# Patient Record
Sex: MALE | Race: WHITE | NOT HISPANIC OR LATINO | Employment: FULL TIME | URBAN - METROPOLITAN AREA
[De-identification: names, ages, dates, MRNs, and addresses within clinical notes are randomized per-mention and may not be internally consistent; named-entity substitution may affect disease eponyms.]

---

## 2017-03-07 ENCOUNTER — ALLSCRIPTS OFFICE VISIT (OUTPATIENT)
Dept: OTHER | Facility: OTHER | Age: 42
End: 2017-03-07

## 2017-07-19 ENCOUNTER — GENERIC CONVERSION - ENCOUNTER (OUTPATIENT)
Dept: OTHER | Facility: OTHER | Age: 42
End: 2017-07-19

## 2017-07-19 ENCOUNTER — APPOINTMENT (OUTPATIENT)
Dept: URGENT CARE | Facility: MEDICAL CENTER | Age: 42
End: 2017-07-19

## 2017-07-19 PROCEDURE — G0382 LEV 3 HOSP TYPE B ED VISIT: HCPCS

## 2017-11-02 ENCOUNTER — APPOINTMENT (OUTPATIENT)
Dept: LAB | Facility: MEDICAL CENTER | Age: 42
End: 2017-11-02
Payer: COMMERCIAL

## 2017-11-02 DIAGNOSIS — E03.9 HYPOTHYROIDISM: ICD-10-CM

## 2017-11-02 DIAGNOSIS — R53.83 OTHER FATIGUE: ICD-10-CM

## 2017-11-02 DIAGNOSIS — E66.9 OBESITY: ICD-10-CM

## 2017-11-02 LAB
EST. AVERAGE GLUCOSE BLD GHB EST-MCNC: 131 MG/DL
HBA1C MFR BLD: 6.2 % (ref 4.2–6.3)
TSH SERPL DL<=0.05 MIU/L-ACNC: 8.55 UIU/ML (ref 0.36–3.74)

## 2017-11-02 PROCEDURE — 36415 COLL VENOUS BLD VENIPUNCTURE: CPT

## 2017-11-02 PROCEDURE — 84443 ASSAY THYROID STIM HORMONE: CPT

## 2017-11-02 PROCEDURE — 83036 HEMOGLOBIN GLYCOSYLATED A1C: CPT

## 2018-01-13 VITALS
WEIGHT: 289.6 LBS | HEART RATE: 68 BPM | OXYGEN SATURATION: 100 % | BODY MASS INDEX: 37.17 KG/M2 | TEMPERATURE: 97.2 F | DIASTOLIC BLOOD PRESSURE: 78 MMHG | RESPIRATION RATE: 18 BRPM | HEIGHT: 74 IN | SYSTOLIC BLOOD PRESSURE: 140 MMHG

## 2018-01-15 ENCOUNTER — ALLSCRIPTS OFFICE VISIT (OUTPATIENT)
Dept: OTHER | Facility: OTHER | Age: 43
End: 2018-01-15

## 2018-01-15 DIAGNOSIS — R07.9 CHEST PAIN: ICD-10-CM

## 2018-01-16 NOTE — PROGRESS NOTES
Assessment   1  Hypothyroidism (244 9) (E03 9)   2  Chest pain (786 50) (R07 9)   3  Colon cancer screening (V76 51) (Z12 11)   4  Low serum testosterone level (790 99) (R79 89)   5  Obesity (278 00) (E66 9)    Plan   Chest pain    · EKG/ECG- POC; Status:Active - Perform Order,Retrospective Authorization; Requested    for:15Jan2018;    · STRESS TEST ONLY, EXERCISE; Status:Active - Retrospective Authorization; Requested    for:15Jan2018;   Colon cancer screening    · 1 - Luis HOLLAND, Hellen Salter  (Gastroenterology) Co-Management  *  Status: Active - Retrospective    Authorization  Requested for: [id number]  Care Summary provided  : Yes   · COLONOSCOPY; Status:Active - Retrospective Authorization; Requested for:15Jan2018;   Hyperlipidemia, Hypothyroidism    · (1) CBC/PLT/DIFF; Status:Active - Retrospective Authorization; Requested for:06Gcw0542;       · (1) HEMOGLOBIN A1C; Status:Active - Retrospective Authorization; Requested    for:59Laq3248;    · (1) LIPID PANEL, FASTING; Status:Active - Retrospective Authorization; Requested    for:87Gzs2758;    · (1) TSH; Status:Active - Retrospective Authorization; Requested for:17Wfg9828; Low serum testosterone level    · *1 - [facility] Co-Management  *  Status: Active - Retrospective    Authorization  Requested for: [id number]  Care Summary provided  : Yes  Obesity    · Levothyroxine Sodium 25 MCG Oral Tablet; TAKE 1 TABLET DAILY  Thyroid disorder    · Levothyroxine Sodium 200 MCG Oral Tablet; TAKE 1 TABLET BY MOUTH EVERY    DAY AS DIRECTED      Check EKG  Stress test   Colonoscopy referral   Blood work in 6 weeks  Recheck at that time  Discussion/Summary      1  Hypothyroidism-significantly improved when he is compliant with his levothyroxine  Will increase the dose to 225 and repeat TSH in 6 weeks  Obesity-encouraged to get back on track with his diet and exercise   Chest pain-due to risk factors and the fact that it is exertional will get a stress test Family history of colon cancer-should have colonoscopy Low testosterone/low libido-will refer to Urology  History of Present Illness   Jenny Flores says he lost 32 lbs by watching his diet and walking 3 miles  Unfortunately he has gained a lot of it back  never got a stress test done as discussed last year  Alejandro Manifold He gets chest pain with exertion  Walking fast, squatting at work  is concerned about UTI's  He had 2-3 times in the past several years  However he has not had any in 3 years  He stopped drinking Dr Theodore Claude  He has been trying to drink water only  is concerned about his low libido  Blood work which showed a low testosterone several years ago but he never followed up with this  has a family history of colon cancer in his grandfather at age 32 and a grandmother at age 39  Unrelated  said that he has been compliant with his levothyroxine  He takes it on an empty stomach  is interested in trying to wean off of his narcotics that he uses for chronic back pain  He sees a pain management specialist in [place]  Active Problems   1  Abnormal EKG (794 31) (R94 31)   2  Anxiety disorder (300 00) (F41 9)   3  Contact dermatitis due to poison ivy (692 6) (L23 7)   4  Depression (311) (F32 9)   5  Elevated WBCs (288 60) (D72 829)   6  Fatigue (780 79) (R53 83)   7  Hematuria (599 70) (R31 9)   8  History of abnormal electrocardiogram (V12 59) (Z86 79)   9  Hyperlipidemia (272 4) (E78 5)   10  Hypothyroidism (244 9) (E03 9)   11  Low serum testosterone level (790 99) (R79 89)   12  Lower back pain (724 2) (M54 5)   13  Obesity (278 00) (E66 9)   14  Thyroid disorder (246 9) (E07 9)   15  Umbilical hernia (734 9) (K42 9)   16  Urinary tract infection (599 0) (N39 0)    Past Medical History   1  History of urinary tract infection (V13 02) (Z87 440)    Surgical History   1  History of Appendectomy   2  History of Cholecystectomy   3  History of Cholecystectomy   4  History of Laminectomy Lumbar   5   History of Umbilical Hernia Repair   6  History of Umbilical Hernia Repair    Family History   Mother    1  Family history of    2  Family history of hepatic cirrhosis (V18 59) (Z83 79)   3  No pertinent family history  Father    4  Family history of    5  Family history of hepatic cirrhosis (V18 59) (Z83 79)    Social History    · Denied: History of Alcohol use   · Former smoker () (G93 434)   · Former smoker () (E84 663)    Current Meds    1  Levothyroxine Sodium 200 MCG Oral Tablet; TAKE 1 TABLET BY MOUTH EVERY DAY AS     DIRECTED; Therapy: 47IHX4924 to (Evaluate:2018)  Requested for: 71LMC4408; Last     Rx:2018 Ordered   2  OxyCODONE HCl - 15 MG Oral Tablet; Therapy: 25RVM0203 to (Evaluate:83Vhw6839) Recorded   3  OxyCONTIN 40 MG Oral Tablet ER 12 Hour Abuse-Deterrent; 1tablet three times a day     Recorded    Allergies   1  No Known Drug Allergies    Vitals   Vital Signs    Recorded: 87FZW2528 09:54AM   Heart Rate 60   Respiration 16   Systolic 284   Diastolic 84   BP CUFF SIZE Large   Weight 298 lb    BMI Calculated 38 26   BSA Calculated 2 57     Physical Exam        Constitutional      General appearance: No acute distress, well appearing and well nourished  Neck      Neck: Supple, symmetric, trachea midline, no masses  Thyroid: Normal, no thyromegaly  Pulmonary      Respiratory effort: No increased work of breathing or signs of respiratory distress  Auscultation of lungs: Clear to auscultation  Cardiovascular      Auscultation of heart: Normal rate and rhythm, normal S1 and S2, no murmurs         Signatures    Electronically signed by : BEATRICE Garnett ; Adriano 15 2018  9:58PM EST                       (Author)

## 2018-01-17 NOTE — MISCELLANEOUS
Provider Comments  Provider Comments: This appointment was confirmed with the patients wife        Signatures   Electronically signed by : Ting Arechiga, ; Mar  7 2017  9:06AM EST                       (Author)

## 2018-01-22 VITALS
SYSTOLIC BLOOD PRESSURE: 124 MMHG | RESPIRATION RATE: 16 BRPM | WEIGHT: 298 LBS | DIASTOLIC BLOOD PRESSURE: 84 MMHG | HEART RATE: 60 BPM | BODY MASS INDEX: 38.26 KG/M2

## 2018-03-16 ENCOUNTER — TRANSCRIBE ORDERS (OUTPATIENT)
Dept: ADMINISTRATIVE | Facility: HOSPITAL | Age: 43
End: 2018-03-16

## 2018-03-16 ENCOUNTER — APPOINTMENT (OUTPATIENT)
Dept: LAB | Facility: MEDICAL CENTER | Age: 43
End: 2018-03-16
Payer: COMMERCIAL

## 2018-03-16 DIAGNOSIS — E03.9 HYPOTHYROIDISM, UNSPECIFIED TYPE: ICD-10-CM

## 2018-03-16 DIAGNOSIS — E78.5 HYPERLIPIDEMIA, UNSPECIFIED HYPERLIPIDEMIA TYPE: ICD-10-CM

## 2018-03-16 DIAGNOSIS — E03.9 HYPOTHYROIDISM, UNSPECIFIED TYPE: Primary | ICD-10-CM

## 2018-03-16 LAB
BASOPHILS # BLD AUTO: 0.01 THOUSANDS/ΜL (ref 0–0.1)
BASOPHILS NFR BLD AUTO: 0 % (ref 0–1)
EOSINOPHIL # BLD AUTO: 0.08 THOUSAND/ΜL (ref 0–0.61)
EOSINOPHIL NFR BLD AUTO: 1 % (ref 0–6)
ERYTHROCYTE [DISTWIDTH] IN BLOOD BY AUTOMATED COUNT: 13.7 % (ref 11.6–15.1)
EST. AVERAGE GLUCOSE BLD GHB EST-MCNC: 114 MG/DL
HBA1C MFR BLD: 5.6 % (ref 4.2–6.3)
HCT VFR BLD AUTO: 39.6 % (ref 36.5–49.3)
HGB BLD-MCNC: 13.2 G/DL (ref 12–17)
LYMPHOCYTES # BLD AUTO: 2.74 THOUSANDS/ΜL (ref 0.6–4.47)
LYMPHOCYTES NFR BLD AUTO: 35 % (ref 14–44)
MCH RBC QN AUTO: 28.1 PG (ref 26.8–34.3)
MCHC RBC AUTO-ENTMCNC: 33.3 G/DL (ref 31.4–37.4)
MCV RBC AUTO: 84 FL (ref 82–98)
MONOCYTES # BLD AUTO: 0.63 THOUSAND/ΜL (ref 0.17–1.22)
MONOCYTES NFR BLD AUTO: 8 % (ref 4–12)
NEUTROPHILS # BLD AUTO: 4.46 THOUSANDS/ΜL (ref 1.85–7.62)
NEUTS SEG NFR BLD AUTO: 56 % (ref 43–75)
NRBC BLD AUTO-RTO: 0 /100 WBCS
PLATELET # BLD AUTO: 238 THOUSANDS/UL (ref 149–390)
PMV BLD AUTO: 10.3 FL (ref 8.9–12.7)
RBC # BLD AUTO: 4.69 MILLION/UL (ref 3.88–5.62)
TSH SERPL DL<=0.05 MIU/L-ACNC: 0.15 UIU/ML (ref 0.36–3.74)
WBC # BLD AUTO: 7.95 THOUSAND/UL (ref 4.31–10.16)

## 2018-03-16 PROCEDURE — 83036 HEMOGLOBIN GLYCOSYLATED A1C: CPT | Performed by: FAMILY MEDICINE

## 2018-03-16 PROCEDURE — 85025 COMPLETE CBC W/AUTO DIFF WBC: CPT

## 2018-03-16 PROCEDURE — 84443 ASSAY THYROID STIM HORMONE: CPT

## 2018-03-16 PROCEDURE — 36415 COLL VENOUS BLD VENIPUNCTURE: CPT | Performed by: FAMILY MEDICINE

## 2018-03-22 ENCOUNTER — TELEPHONE (OUTPATIENT)
Dept: FAMILY MEDICINE CLINIC | Facility: MEDICAL CENTER | Age: 43
End: 2018-03-22

## 2018-03-22 NOTE — TELEPHONE ENCOUNTER
----- Message from Grace Rees MD sent at 3/21/2018 10:46 PM EDT -----  Needs appointment to review blood work; was a no show for his appointment this week

## 2018-03-26 ENCOUNTER — OFFICE VISIT (OUTPATIENT)
Dept: FAMILY MEDICINE CLINIC | Facility: MEDICAL CENTER | Age: 43
End: 2018-03-26
Payer: COMMERCIAL

## 2018-03-26 VITALS
DIASTOLIC BLOOD PRESSURE: 70 MMHG | RESPIRATION RATE: 16 BRPM | BODY MASS INDEX: 37.11 KG/M2 | HEART RATE: 80 BPM | WEIGHT: 289 LBS | SYSTOLIC BLOOD PRESSURE: 138 MMHG

## 2018-03-26 DIAGNOSIS — Z91.89 AT RISK FOR SLEEP APNEA: Primary | ICD-10-CM

## 2018-03-26 DIAGNOSIS — E03.9 ACQUIRED HYPOTHYROIDISM: ICD-10-CM

## 2018-03-26 DIAGNOSIS — E78.5 HYPERLIPIDEMIA, UNSPECIFIED HYPERLIPIDEMIA TYPE: ICD-10-CM

## 2018-03-26 PROCEDURE — 99214 OFFICE O/P EST MOD 30 MIN: CPT | Performed by: FAMILY MEDICINE

## 2018-03-26 RX ORDER — LEVOTHYROXINE SODIUM 0.03 MG/1
1 TABLET ORAL DAILY
COMMUNITY
Start: 2018-01-15 | End: 2018-04-13 | Stop reason: SDUPTHER

## 2018-03-26 RX ORDER — LEVOTHYROXINE SODIUM 0.2 MG/1
1 TABLET ORAL DAILY
COMMUNITY
Start: 2014-05-21 | End: 2018-04-13 | Stop reason: SDUPTHER

## 2018-03-26 RX ORDER — OXYCODONE HYDROCHLORIDE 15 MG/1
TABLET ORAL
COMMUNITY
Start: 2014-05-08 | End: 2019-06-29 | Stop reason: ALTCHOICE

## 2018-03-26 RX ORDER — OXYCODONE HCL 40 MG/1
TABLET, FILM COATED, EXTENDED RELEASE ORAL
COMMUNITY
End: 2019-06-29 | Stop reason: ALTCHOICE

## 2018-03-26 NOTE — PROGRESS NOTES
Rodrigo levothyroxine dose was increased to 225;  says he doesn't feel any different  He has been taking it consistently every morning  Empty stomach  He has had doses as high as 300 in the past   Denies palpitations or insomnia  He did not go to the urologist yet  Did not get a stress test yet  He still needs to get his lipid profile blood work done  He has been trying to lose weight  He did lose about 10 lb  He continues to walk 3-4 miles daily with his wife  Wife notes that he seems to gas when he falls asleep  He can get shaky  O: /70 (Cuff Size: Large)   Pulse 80   Resp 16   Wt 131 kg (289 lb)   BMI 37 11 kg/m²   Weight is down 9 lb  Neck no thyromegaly  Chest clear  Cardiac regular rate without murmur    Blood work 3/16/18   TSH   149   A1c, CBC, normal    Assessment  1  Hypothyroidism-TSH improved with higher dose  Will repeat again in 1 month in light of borderline low  2   Obesity-has lost almost 10 lb  Encouraged to continue  3   Chest pain-does not get this exertionally  In light of his risk factors however will still checked for stress test   4   Possible sleep apnea-will refer for for sleep consult  Plan  Still need CMP and lipid profile  TSH in a month  Referral for sleep consult

## 2018-03-30 ENCOUNTER — APPOINTMENT (OUTPATIENT)
Dept: LAB | Facility: MEDICAL CENTER | Age: 43
End: 2018-03-30
Payer: COMMERCIAL

## 2018-03-30 DIAGNOSIS — E78.5 HYPERLIPIDEMIA, UNSPECIFIED HYPERLIPIDEMIA TYPE: ICD-10-CM

## 2018-03-30 DIAGNOSIS — E03.9 ACQUIRED HYPOTHYROIDISM: ICD-10-CM

## 2018-03-30 LAB
ALBUMIN SERPL BCP-MCNC: 3.6 G/DL (ref 3.5–5)
ALP SERPL-CCNC: 106 U/L (ref 46–116)
ALT SERPL W P-5'-P-CCNC: 141 U/L (ref 12–78)
ANION GAP SERPL CALCULATED.3IONS-SCNC: 5 MMOL/L (ref 4–13)
AST SERPL W P-5'-P-CCNC: 65 U/L (ref 5–45)
BILIRUB SERPL-MCNC: 0.46 MG/DL (ref 0.2–1)
BUN SERPL-MCNC: 17 MG/DL (ref 5–25)
CALCIUM SERPL-MCNC: 8.5 MG/DL (ref 8.3–10.1)
CHLORIDE SERPL-SCNC: 109 MMOL/L (ref 100–108)
CHOLEST SERPL-MCNC: 115 MG/DL (ref 50–200)
CO2 SERPL-SCNC: 28 MMOL/L (ref 21–32)
CREAT SERPL-MCNC: 1.13 MG/DL (ref 0.6–1.3)
GFR SERPL CREATININE-BSD FRML MDRD: 80 ML/MIN/1.73SQ M
GLUCOSE P FAST SERPL-MCNC: 92 MG/DL (ref 65–99)
HDLC SERPL-MCNC: 43 MG/DL (ref 40–60)
LDLC SERPL CALC-MCNC: 61 MG/DL (ref 0–100)
POTASSIUM SERPL-SCNC: 3.9 MMOL/L (ref 3.5–5.3)
PROT SERPL-MCNC: 7.3 G/DL (ref 6.4–8.2)
SODIUM SERPL-SCNC: 142 MMOL/L (ref 136–145)
TRIGL SERPL-MCNC: 54 MG/DL

## 2018-03-30 PROCEDURE — 36415 COLL VENOUS BLD VENIPUNCTURE: CPT

## 2018-03-30 PROCEDURE — 80061 LIPID PANEL: CPT

## 2018-03-30 PROCEDURE — 80053 COMPREHEN METABOLIC PANEL: CPT

## 2018-04-02 DIAGNOSIS — R74.8 ABNORMAL AST AND ALT: Primary | ICD-10-CM

## 2018-04-13 DIAGNOSIS — E03.9 ACQUIRED HYPOTHYROIDISM: Primary | ICD-10-CM

## 2018-04-13 RX ORDER — LEVOTHYROXINE SODIUM 0.03 MG/1
TABLET ORAL
Qty: 30 TABLET | Refills: 2 | Status: SHIPPED | OUTPATIENT
Start: 2018-04-13 | End: 2018-07-09 | Stop reason: SDUPTHER

## 2018-04-13 RX ORDER — LEVOTHYROXINE SODIUM 0.2 MG/1
TABLET ORAL
Qty: 30 TABLET | Refills: 2 | Status: SHIPPED | OUTPATIENT
Start: 2018-04-13 | End: 2018-07-09 | Stop reason: SDUPTHER

## 2018-06-04 ENCOUNTER — TRANSCRIBE ORDERS (OUTPATIENT)
Dept: ADMINISTRATIVE | Facility: HOSPITAL | Age: 43
End: 2018-06-04

## 2018-06-04 DIAGNOSIS — M47.817 LUMBAR AND SACRAL OSTEOARTHRITIS: Primary | ICD-10-CM

## 2018-06-14 ENCOUNTER — OFFICE VISIT (OUTPATIENT)
Dept: SLEEP CENTER | Facility: CLINIC | Age: 43
End: 2018-06-14
Payer: COMMERCIAL

## 2018-06-14 VITALS
WEIGHT: 309.4 LBS | BODY MASS INDEX: 38.47 KG/M2 | DIASTOLIC BLOOD PRESSURE: 86 MMHG | HEIGHT: 75 IN | SYSTOLIC BLOOD PRESSURE: 128 MMHG | HEART RATE: 78 BPM

## 2018-06-14 DIAGNOSIS — G47.33 OSA (OBSTRUCTIVE SLEEP APNEA): Primary | ICD-10-CM

## 2018-06-14 DIAGNOSIS — Z91.89 AT RISK FOR SLEEP APNEA: ICD-10-CM

## 2018-06-14 PROCEDURE — 99204 OFFICE O/P NEW MOD 45 MIN: CPT | Performed by: INTERNAL MEDICINE

## 2018-06-14 RX ORDER — ZOLPIDEM TARTRATE 10 MG/1
10 TABLET ORAL
Qty: 2 TABLET | Refills: 0 | Status: SHIPPED | OUTPATIENT
Start: 2018-06-14 | End: 2019-06-29 | Stop reason: ALTCHOICE

## 2018-06-14 NOTE — PROGRESS NOTES
Alex - Zeny Babb : 1975  MRN: [id number]      Assessment:  The patient has symptoms consistent with obstructive sleep apnea  He also has excessive daytime sleepiness, which may be multifactorial   He works nights and he takes narcotic analgesics on a chronic basis in addition to possible MAC  Plan:  I have ordered a split study  Titration will start after recording 10 events and an AHI greater than 5  I have also given him two Ambien tablets, 1 to try at home and 1 to bring with him to the sleep lab  Follow up: After testing    History of Present Illness:   37 y o male with a history of snoring and witnessed apneas who experiences severe daytime sleepiness  He works nights, which seems to contribute to his drowsiness  He also takes chronic narcotic analgesics for back pain  He denies any history of hypertension or cardiovascular disease  He reports headache, chronic pain, urinary frequency and talking in his sleep  Review of Systems:      Genitourinary need to urinate more than twice a night   Cardiology ankle/leg swelling   Gastrointestinal none   Neurology numbness/tingling of an extremity   Constitutional none   Integumentary none   Psychiatry anxiety   Musculoskeletal back pain   Pulmonary snoring   ENT none   Endocrine frequent urination   Hematological none           Historical Information    Past Medical History:  Hypothyroidism      Family History: non-contributory      Sleep Schedule: unremarkable    Snoring:    Yes, with gasping      Witnessed Apnea:    Yes    Medications/Allergies:    Current Outpatient Prescriptions:     levothyroxine 200 mcg tablet, TAKE 1 TABLET BY MOUTH EVERY DAY AS DIRECTED, Disp: 30 tablet, Rfl: 2    levothyroxine 25 mcg tablet, TAKE 1 TABLET DAILY  , Disp: 30 tablet, Rfl: 2    oxyCODONE (OXYCONTIN) 40 mg 12 hr tablet, Take by mouth, Disp: , Rfl:     oxyCODONE (ROXICODONE) 15 mg immediate release tablet, Take by mouth, Disp: , Rfl:     zolpidem (AMBIEN) 10 mg tablet, Take 1 tablet (10 mg total) by mouth daily at bedtime as needed for sleep, Disp: 2 tablet, Rfl: 0        No notes on file                  Objective:    Vital Signs:   Vitals:    06/14/18 1100   BP: 128/86   Pulse: 78   Weight: (!) 140 kg (309 lb 6 4 oz)   Height: 6' 3" (1 905 m)     Neck Circumference: 45      Stratton Sleepiness Scale: Total score: 22    Physical Exam:    General: Alert, appropriate, cooperative, overweight    Head: NC/AT, no retrognathia    Nose: No septal deviation, nares partially obstructed, mucosa normal    Throat: Airway lumen narrowed, tongue base thickened, no tonsils visualized    Neck: Normal, no thyromegaly or lymphadenopathy, no JVD    Heart: RR, normal S1 and S2, no murmurs    Chest: Clear bilaterally    Extremity: No clubbing, cyanosis, no edema    Skin: Warm, dry    Neuro: No motor abnormalities, cranial nerves appear intact      Counseling / Coordination of Care  Total clinic time spent today 25 minutes  Greater than 50% of total time was spent with the patient and / or family counseling and / or coordination of care  A description of the counseling / coordination of care: We discussed the pathophysiology of obstructive sleep apnea as well as the possible treatment options  We also discussed the rationale for positive airway pressure therapy  BEATRICE Rand    Board Certified Sleep Specialist

## 2018-06-28 ENCOUNTER — TELEPHONE (OUTPATIENT)
Dept: SLEEP CENTER | Facility: CLINIC | Age: 43
End: 2018-06-28

## 2018-07-09 DIAGNOSIS — E03.9 ACQUIRED HYPOTHYROIDISM: ICD-10-CM

## 2018-07-11 ENCOUNTER — TELEPHONE (OUTPATIENT)
Dept: SLEEP CENTER | Facility: CLINIC | Age: 43
End: 2018-07-11

## 2018-07-11 RX ORDER — LEVOTHYROXINE SODIUM 0.2 MG/1
TABLET ORAL
Qty: 30 TABLET | Refills: 2 | Status: SHIPPED | OUTPATIENT
Start: 2018-07-11 | End: 2018-10-01 | Stop reason: SDUPTHER

## 2018-07-11 RX ORDER — LEVOTHYROXINE SODIUM 0.03 MG/1
TABLET ORAL
Qty: 30 TABLET | Refills: 2 | Status: SHIPPED | OUTPATIENT
Start: 2018-07-11 | End: 2018-10-01 | Stop reason: SDUPTHER

## 2018-07-11 NOTE — TELEPHONE ENCOUNTER
The patient has snoring, witnessed apneas and excessive daytime sleepiness  He also takes narcotic analgesics and may have central sleep apnea  Please notify me at 1 PM     Addendum July 13, 2018:   The study was approved

## 2018-10-01 DIAGNOSIS — E03.9 ACQUIRED HYPOTHYROIDISM: ICD-10-CM

## 2018-10-05 DIAGNOSIS — E03.9 ACQUIRED HYPOTHYROIDISM: ICD-10-CM

## 2018-10-07 RX ORDER — LEVOTHYROXINE SODIUM 0.2 MG/1
TABLET ORAL
Qty: 30 TABLET | Refills: 2 | Status: SHIPPED | OUTPATIENT
Start: 2018-10-07 | End: 2018-11-12 | Stop reason: SDUPTHER

## 2018-10-07 RX ORDER — LEVOTHYROXINE SODIUM 0.03 MG/1
25 TABLET ORAL DAILY
Qty: 90 TABLET | Refills: 1 | OUTPATIENT
Start: 2018-10-07

## 2018-10-07 RX ORDER — LEVOTHYROXINE SODIUM 0.03 MG/1
TABLET ORAL
Qty: 30 TABLET | Refills: 2 | Status: SHIPPED | OUTPATIENT
Start: 2018-10-07 | End: 2018-10-12 | Stop reason: SDUPTHER

## 2018-10-12 DIAGNOSIS — E03.9 ACQUIRED HYPOTHYROIDISM: ICD-10-CM

## 2018-10-14 RX ORDER — LEVOTHYROXINE SODIUM 0.03 MG/1
25 TABLET ORAL DAILY
Qty: 30 TABLET | Refills: 5 | OUTPATIENT
Start: 2018-10-14 | End: 2018-11-12 | Stop reason: SDUPTHER

## 2018-10-24 NOTE — TELEPHONE ENCOUNTER
Left another message-very detailed  Explained he is overdue for an appointment and that he needs to call back so we can schedule that for him

## 2018-11-12 DIAGNOSIS — E03.9 ACQUIRED HYPOTHYROIDISM: ICD-10-CM

## 2018-11-13 RX ORDER — LEVOTHYROXINE SODIUM 0.2 MG/1
200 TABLET ORAL DAILY
Qty: 30 TABLET | Refills: 0 | Status: SHIPPED | OUTPATIENT
Start: 2018-11-13 | End: 2019-01-28 | Stop reason: SDUPTHER

## 2018-11-13 RX ORDER — LEVOTHYROXINE SODIUM 0.03 MG/1
25 TABLET ORAL DAILY
Qty: 30 TABLET | Refills: 0 | OUTPATIENT
Start: 2018-11-13 | End: 2019-01-28 | Stop reason: SDUPTHER

## 2019-01-03 DIAGNOSIS — E03.9 ACQUIRED HYPOTHYROIDISM: ICD-10-CM

## 2019-01-03 RX ORDER — LEVOTHYROXINE SODIUM 0.2 MG/1
200 TABLET ORAL DAILY
Qty: 30 TABLET | Refills: 0 | Status: CANCELLED | OUTPATIENT
Start: 2019-01-03

## 2019-01-10 NOTE — TELEPHONE ENCOUNTER
Received refill for his levothyroxine  He did not show for his last appointment    Please see me next week about this

## 2019-01-15 NOTE — TELEPHONE ENCOUNTER
Reviewed appt - canceled last appt in November  No show in June 2018, no show for an acute same day appt with Dr Gabriela Haas in June   No show in March with Dr Catherine Akbar  Canceled 12-5-2017 appt  Did arrive for a giovanna appt 2018   Also no show in June for a sleep study appt with a different provider

## 2019-01-25 NOTE — TELEPHONE ENCOUNTER
Let him know we received a refill request for his thyroid  He needs an appointment before we can refill

## 2019-01-31 RX ORDER — LEVOTHYROXINE SODIUM 0.03 MG/1
25 TABLET ORAL DAILY
Qty: 30 TABLET | Refills: 0 | OUTPATIENT
Start: 2019-01-31 | End: 2019-02-07 | Stop reason: SDUPTHER

## 2019-01-31 RX ORDER — LEVOTHYROXINE SODIUM 0.2 MG/1
200 TABLET ORAL DAILY
Qty: 30 TABLET | Refills: 0 | Status: SHIPPED | OUTPATIENT
Start: 2019-01-31 | End: 2019-02-22 | Stop reason: SDUPTHER

## 2019-02-07 DIAGNOSIS — E03.9 ACQUIRED HYPOTHYROIDISM: ICD-10-CM

## 2019-02-09 RX ORDER — LEVOTHYROXINE SODIUM 0.03 MG/1
TABLET ORAL
Qty: 30 TABLET | Refills: 4 | Status: SHIPPED | OUTPATIENT
Start: 2019-02-09 | End: 2019-03-15 | Stop reason: SDUPTHER

## 2019-02-18 ENCOUNTER — APPOINTMENT (OUTPATIENT)
Dept: LAB | Facility: MEDICAL CENTER | Age: 44
End: 2019-02-18
Payer: COMMERCIAL

## 2019-02-18 ENCOUNTER — TRANSCRIBE ORDERS (OUTPATIENT)
Dept: ADMINISTRATIVE | Facility: HOSPITAL | Age: 44
End: 2019-02-18

## 2019-02-18 DIAGNOSIS — Z13.220 SCREENING FOR LIPOID DISORDERS: Primary | ICD-10-CM

## 2019-02-18 DIAGNOSIS — R74.8 ABNORMAL AST AND ALT: ICD-10-CM

## 2019-02-18 DIAGNOSIS — Z13.220 SCREENING FOR LIPOID DISORDERS: ICD-10-CM

## 2019-02-18 LAB
ALBUMIN SERPL BCP-MCNC: 3.7 G/DL (ref 3.5–5)
ALP SERPL-CCNC: 83 U/L (ref 46–116)
ALT SERPL W P-5'-P-CCNC: 47 U/L (ref 12–78)
AST SERPL W P-5'-P-CCNC: 29 U/L (ref 5–45)
BILIRUB DIRECT SERPL-MCNC: 0.11 MG/DL (ref 0–0.2)
BILIRUB SERPL-MCNC: 0.32 MG/DL (ref 0.2–1)
GGT SERPL-CCNC: 68 U/L (ref 5–85)
PROT SERPL-MCNC: 6.9 G/DL (ref 6.4–8.2)

## 2019-02-18 PROCEDURE — 84443 ASSAY THYROID STIM HORMONE: CPT | Performed by: FAMILY MEDICINE

## 2019-02-18 PROCEDURE — 80076 HEPATIC FUNCTION PANEL: CPT | Performed by: PHYSICIAN ASSISTANT

## 2019-02-18 PROCEDURE — 36415 COLL VENOUS BLD VENIPUNCTURE: CPT

## 2019-02-18 PROCEDURE — 82977 ASSAY OF GGT: CPT

## 2019-02-20 ENCOUNTER — OFFICE VISIT (OUTPATIENT)
Dept: FAMILY MEDICINE CLINIC | Facility: MEDICAL CENTER | Age: 44
End: 2019-02-20
Payer: COMMERCIAL

## 2019-02-20 VITALS
BODY MASS INDEX: 38.93 KG/M2 | RESPIRATION RATE: 16 BRPM | HEART RATE: 76 BPM | DIASTOLIC BLOOD PRESSURE: 80 MMHG | HEIGHT: 75 IN | SYSTOLIC BLOOD PRESSURE: 142 MMHG | WEIGHT: 313.13 LBS

## 2019-02-20 DIAGNOSIS — E03.9 ACQUIRED HYPOTHYROIDISM: ICD-10-CM

## 2019-02-20 LAB — TSH SERPL DL<=0.05 MIU/L-ACNC: 1.32 UIU/ML (ref 0.36–3.74)

## 2019-02-20 PROCEDURE — 99214 OFFICE O/P EST MOD 30 MIN: CPT | Performed by: FAMILY MEDICINE

## 2019-02-20 PROCEDURE — 3008F BODY MASS INDEX DOCD: CPT | Performed by: FAMILY MEDICINE

## 2019-02-20 NOTE — PROGRESS NOTES
Josué Cali is here for f/u of his hypothyroidism  He takes is levothyroxine when he gets home from work in the morning  It is on an empty stomach  Energy level is okay  Just got his blood work done which was ordered last March  He gained his weight back  He never went for the stress test we discusssed last year  He still gets an occ sharp pain which is at rest and is pleuritic  Less than once a month  It is never exertional and his job is exertional   He never went for his sleep study  Got approval but never went back  He is afraid to use a mask  He admits that he has got off track with his diet and exercise  He had lost a lot of weight last year  Gained back recently  O: /80 (BP Location: Left arm, Patient Position: Sitting, Cuff Size: Large)   Pulse 76   Resp 16   Ht 6' 3" (1 905 m)   Wt (!) 142 kg (313 lb 2 oz)   BMI 39 14 kg/m²   Blood pressure by me large cuff 128/80  Neck no adenopathy thyromegaly bruits  Chest clear  Cardiac regular rate rhythm  Distant heart sounds  Blood work 02/18/2019   TSH 1 32 LFTs normal    Assessment  1  Hypothyroidism-controlled  2  Obesity-encouraged him to get back on track with diet and exercise  Hopefully him going to day shift will be helpful here  3   Chest pain-does not sound ischemic however will still get stress test done  4  obstructive sleep apnea-encouraged to go back to pulmonology  5  History of elevated LFTs-likely due to fatty liver  Never got abdominal ultrasound that was ordered    Weight loss advised as above    Plan  Blood work and recheck in 6 months

## 2019-02-21 ENCOUNTER — TELEPHONE (OUTPATIENT)
Dept: FAMILY MEDICINE CLINIC | Facility: MEDICAL CENTER | Age: 44
End: 2019-02-21

## 2019-02-21 DIAGNOSIS — E89.0 POSTABLATIVE HYPOTHYROIDISM: ICD-10-CM

## 2019-02-21 DIAGNOSIS — E66.9 OBESITY, UNSPECIFIED CLASSIFICATION, UNSPECIFIED OBESITY TYPE, UNSPECIFIED WHETHER SERIOUS COMORBIDITY PRESENT: Primary | ICD-10-CM

## 2019-02-21 DIAGNOSIS — E78.5 HYPERLIPIDEMIA, UNSPECIFIED HYPERLIPIDEMIA TYPE: ICD-10-CM

## 2019-02-21 DIAGNOSIS — R79.89 ELEVATED LFTS: ICD-10-CM

## 2019-02-21 NOTE — TELEPHONE ENCOUNTER
----- Message from Sherry Goncalves MD sent at 2/20/2019 10:52 PM EST -----  Notify his TSH was normal   He gets full blood work with an A1c in 6 months  Needs appointment at that time  He might have already made 1

## 2019-02-21 NOTE — TELEPHONE ENCOUNTER
Aware, lab orders placed into his chart  He also requested a copy of his stress test order from last year since he does want to have that done now  Will mail him a copy of previous order  He will use a  [address], no need to mail lab orders

## 2019-02-22 DIAGNOSIS — E03.9 ACQUIRED HYPOTHYROIDISM: ICD-10-CM

## 2019-02-24 RX ORDER — LEVOTHYROXINE SODIUM 0.2 MG/1
200 TABLET ORAL DAILY
Qty: 90 TABLET | Refills: 1 | Status: SHIPPED | OUTPATIENT
Start: 2019-02-24 | End: 2019-08-23 | Stop reason: SDUPTHER

## 2019-03-15 DIAGNOSIS — E03.9 ACQUIRED HYPOTHYROIDISM: ICD-10-CM

## 2019-03-17 RX ORDER — LEVOTHYROXINE SODIUM 0.03 MG/1
25 TABLET ORAL DAILY
Qty: 90 TABLET | Refills: 1 | Status: SHIPPED | OUTPATIENT
Start: 2019-03-17 | End: 2019-09-20 | Stop reason: SDUPTHER

## 2019-06-29 ENCOUNTER — OFFICE VISIT (OUTPATIENT)
Dept: URGENT CARE | Facility: MEDICAL CENTER | Age: 44
End: 2019-06-29
Payer: COMMERCIAL

## 2019-06-29 VITALS
DIASTOLIC BLOOD PRESSURE: 75 MMHG | HEIGHT: 75 IN | TEMPERATURE: 98.7 F | OXYGEN SATURATION: 95 % | RESPIRATION RATE: 20 BRPM | SYSTOLIC BLOOD PRESSURE: 144 MMHG | HEART RATE: 71 BPM | WEIGHT: 315 LBS | BODY MASS INDEX: 39.17 KG/M2

## 2019-06-29 DIAGNOSIS — M75.81 ROTATOR CUFF TENDINITIS, RIGHT: Primary | ICD-10-CM

## 2019-06-29 PROCEDURE — 20610 DRAIN/INJ JOINT/BURSA W/O US: CPT | Performed by: PHYSICIAN ASSISTANT

## 2019-06-29 RX ORDER — MELOXICAM 15 MG/1
15 TABLET ORAL DAILY
Qty: 14 TABLET | Refills: 0 | Status: SHIPPED | OUTPATIENT
Start: 2019-06-29 | End: 2019-10-03 | Stop reason: ALTCHOICE

## 2019-06-29 RX ORDER — OXYCODONE HCL 40 MG/1
TABLET, FILM COATED, EXTENDED RELEASE ORAL
COMMUNITY
Start: 2015-12-31 | End: 2020-01-22 | Stop reason: CLARIF

## 2019-06-29 RX ORDER — LIDOCAINE HYDROCHLORIDE 10 MG/ML
10 INJECTION, SOLUTION EPIDURAL; INFILTRATION; INTRACAUDAL; PERINEURAL ONCE
Status: COMPLETED | OUTPATIENT
Start: 2019-06-29 | End: 2019-06-29

## 2019-06-29 RX ORDER — TRIAMCINOLONE ACETONIDE 40 MG/ML
40 INJECTION, SUSPENSION INTRA-ARTICULAR; INTRAMUSCULAR ONCE
Status: COMPLETED | OUTPATIENT
Start: 2019-06-29 | End: 2019-06-29

## 2019-06-29 RX ORDER — OXYCODONE HYDROCHLORIDE 15 MG/1
TABLET ORAL
COMMUNITY
Start: 2015-12-31

## 2019-06-29 RX ORDER — LIDOCAINE HYDROCHLORIDE 10 MG/ML
8 INJECTION, SOLUTION INFILTRATION; PERINEURAL
Status: COMPLETED | OUTPATIENT
Start: 2019-06-29 | End: 2019-06-29

## 2019-06-29 RX ORDER — TRIAMCINOLONE ACETONIDE 40 MG/ML
40 INJECTION, SUSPENSION INTRA-ARTICULAR; INTRAMUSCULAR
Status: COMPLETED | OUTPATIENT
Start: 2019-06-29 | End: 2019-06-29

## 2019-06-29 RX ADMIN — TRIAMCINOLONE ACETONIDE 40 MG: 40 INJECTION, SUSPENSION INTRA-ARTICULAR; INTRAMUSCULAR at 14:44

## 2019-06-29 RX ADMIN — LIDOCAINE HYDROCHLORIDE 8 ML: 10 INJECTION, SOLUTION INFILTRATION; PERINEURAL at 14:44

## 2019-06-29 RX ADMIN — LIDOCAINE HYDROCHLORIDE 10 ML: 10 INJECTION, SOLUTION EPIDURAL; INFILTRATION; INTRACAUDAL; PERINEURAL at 14:52

## 2019-06-29 RX ADMIN — TRIAMCINOLONE ACETONIDE 40 MG: 40 INJECTION, SUSPENSION INTRA-ARTICULAR; INTRAMUSCULAR at 14:52

## 2019-08-23 DIAGNOSIS — E03.9 ACQUIRED HYPOTHYROIDISM: ICD-10-CM

## 2019-08-25 RX ORDER — LEVOTHYROXINE SODIUM 0.2 MG/1
200 TABLET ORAL DAILY
Qty: 90 TABLET | Refills: 1 | Status: SHIPPED | OUTPATIENT
Start: 2019-08-25 | End: 2020-03-02

## 2019-09-20 DIAGNOSIS — E03.9 ACQUIRED HYPOTHYROIDISM: ICD-10-CM

## 2019-09-22 RX ORDER — LEVOTHYROXINE SODIUM 0.03 MG/1
TABLET ORAL
Qty: 90 TABLET | Refills: 0 | Status: SHIPPED | OUTPATIENT
Start: 2019-09-22 | End: 2019-12-28 | Stop reason: SDUPTHER

## 2019-09-30 ENCOUNTER — APPOINTMENT (OUTPATIENT)
Dept: LAB | Facility: MEDICAL CENTER | Age: 44
End: 2019-09-30
Payer: COMMERCIAL

## 2019-09-30 DIAGNOSIS — E89.0 POSTABLATIVE HYPOTHYROIDISM: ICD-10-CM

## 2019-09-30 DIAGNOSIS — E78.5 HYPERLIPIDEMIA, UNSPECIFIED HYPERLIPIDEMIA TYPE: ICD-10-CM

## 2019-09-30 DIAGNOSIS — R79.89 ELEVATED LFTS: ICD-10-CM

## 2019-09-30 DIAGNOSIS — E66.9 OBESITY, UNSPECIFIED CLASSIFICATION, UNSPECIFIED OBESITY TYPE, UNSPECIFIED WHETHER SERIOUS COMORBIDITY PRESENT: ICD-10-CM

## 2019-09-30 LAB
ALBUMIN SERPL BCP-MCNC: 4.4 G/DL (ref 3.5–5)
ALP SERPL-CCNC: 68 U/L (ref 46–116)
ALT SERPL W P-5'-P-CCNC: 51 U/L (ref 12–78)
ANION GAP SERPL CALCULATED.3IONS-SCNC: 5 MMOL/L (ref 4–13)
AST SERPL W P-5'-P-CCNC: 28 U/L (ref 5–45)
BASOPHILS # BLD AUTO: 0.05 THOUSANDS/ΜL (ref 0–0.1)
BASOPHILS NFR BLD AUTO: 1 % (ref 0–1)
BILIRUB SERPL-MCNC: 0.34 MG/DL (ref 0.2–1)
BUN SERPL-MCNC: 18 MG/DL (ref 5–25)
CALCIUM SERPL-MCNC: 9.3 MG/DL (ref 8.3–10.1)
CHLORIDE SERPL-SCNC: 108 MMOL/L (ref 100–108)
CHOLEST SERPL-MCNC: 146 MG/DL (ref 50–200)
CO2 SERPL-SCNC: 27 MMOL/L (ref 21–32)
CREAT SERPL-MCNC: 1.15 MG/DL (ref 0.6–1.3)
EOSINOPHIL # BLD AUTO: 0.15 THOUSAND/ΜL (ref 0–0.61)
EOSINOPHIL NFR BLD AUTO: 2 % (ref 0–6)
ERYTHROCYTE [DISTWIDTH] IN BLOOD BY AUTOMATED COUNT: 13.8 % (ref 11.6–15.1)
EST. AVERAGE GLUCOSE BLD GHB EST-MCNC: 131 MG/DL
GFR SERPL CREATININE-BSD FRML MDRD: 77 ML/MIN/1.73SQ M
GLUCOSE P FAST SERPL-MCNC: 101 MG/DL (ref 65–99)
HBA1C MFR BLD: 6.2 % (ref 4.2–6.3)
HCT VFR BLD AUTO: 43.1 % (ref 36.5–49.3)
HDLC SERPL-MCNC: 42 MG/DL (ref 40–60)
HGB BLD-MCNC: 13.7 G/DL (ref 12–17)
IMM GRANULOCYTES # BLD AUTO: 0.06 THOUSAND/UL (ref 0–0.2)
IMM GRANULOCYTES NFR BLD AUTO: 1 % (ref 0–2)
LDLC SERPL CALC-MCNC: 84 MG/DL (ref 0–100)
LYMPHOCYTES # BLD AUTO: 2.56 THOUSANDS/ΜL (ref 0.6–4.47)
LYMPHOCYTES NFR BLD AUTO: 35 % (ref 14–44)
MCH RBC QN AUTO: 27.9 PG (ref 26.8–34.3)
MCHC RBC AUTO-ENTMCNC: 31.8 G/DL (ref 31.4–37.4)
MCV RBC AUTO: 88 FL (ref 82–98)
MONOCYTES # BLD AUTO: 0.74 THOUSAND/ΜL (ref 0.17–1.22)
MONOCYTES NFR BLD AUTO: 10 % (ref 4–12)
NEUTROPHILS # BLD AUTO: 3.83 THOUSANDS/ΜL (ref 1.85–7.62)
NEUTS SEG NFR BLD AUTO: 51 % (ref 43–75)
NONHDLC SERPL-MCNC: 104 MG/DL
NRBC BLD AUTO-RTO: 0 /100 WBCS
PLATELET # BLD AUTO: 238 THOUSANDS/UL (ref 149–390)
PMV BLD AUTO: 10.6 FL (ref 8.9–12.7)
POTASSIUM SERPL-SCNC: 4.3 MMOL/L (ref 3.5–5.3)
PROT SERPL-MCNC: 7.6 G/DL (ref 6.4–8.2)
RBC # BLD AUTO: 4.91 MILLION/UL (ref 3.88–5.62)
SODIUM SERPL-SCNC: 140 MMOL/L (ref 136–145)
TRIGL SERPL-MCNC: 99 MG/DL
TSH SERPL DL<=0.05 MIU/L-ACNC: 3.69 UIU/ML (ref 0.36–3.74)
WBC # BLD AUTO: 7.39 THOUSAND/UL (ref 4.31–10.16)

## 2019-09-30 PROCEDURE — 83036 HEMOGLOBIN GLYCOSYLATED A1C: CPT

## 2019-09-30 PROCEDURE — 80061 LIPID PANEL: CPT

## 2019-09-30 PROCEDURE — 84443 ASSAY THYROID STIM HORMONE: CPT

## 2019-09-30 PROCEDURE — 36415 COLL VENOUS BLD VENIPUNCTURE: CPT

## 2019-09-30 PROCEDURE — 80053 COMPREHEN METABOLIC PANEL: CPT

## 2019-09-30 PROCEDURE — 85025 COMPLETE CBC W/AUTO DIFF WBC: CPT

## 2019-10-03 ENCOUNTER — OFFICE VISIT (OUTPATIENT)
Dept: FAMILY MEDICINE CLINIC | Facility: MEDICAL CENTER | Age: 44
End: 2019-10-03
Payer: COMMERCIAL

## 2019-10-03 VITALS
BODY MASS INDEX: 40.15 KG/M2 | RESPIRATION RATE: 16 BRPM | HEART RATE: 76 BPM | DIASTOLIC BLOOD PRESSURE: 86 MMHG | WEIGHT: 315 LBS | SYSTOLIC BLOOD PRESSURE: 120 MMHG

## 2019-10-03 DIAGNOSIS — E66.9 OBESITY, UNSPECIFIED CLASSIFICATION, UNSPECIFIED OBESITY TYPE, UNSPECIFIED WHETHER SERIOUS COMORBIDITY PRESENT: ICD-10-CM

## 2019-10-03 DIAGNOSIS — R07.9 CHEST PAIN, UNSPECIFIED TYPE: ICD-10-CM

## 2019-10-03 DIAGNOSIS — R06.02 SHORTNESS OF BREATH: ICD-10-CM

## 2019-10-03 DIAGNOSIS — Z91.89 AT RISK FOR SLEEP APNEA: ICD-10-CM

## 2019-10-03 DIAGNOSIS — E03.9 ACQUIRED HYPOTHYROIDISM: Primary | ICD-10-CM

## 2019-10-03 DIAGNOSIS — Z80.0 FAMILY HISTORY OF COLON CANCER: ICD-10-CM

## 2019-10-03 PROCEDURE — 99214 OFFICE O/P EST MOD 30 MIN: CPT | Performed by: FAMILY MEDICINE

## 2019-10-03 NOTE — PROGRESS NOTES
Fernanda Ramesh is here for 6 month f/u  He still did not get his stress test  Still gets VALENZUELA with running, wallking up many steps  He thinks it is weight related  He still did not get his sleep consult for f/u of apnea  He has not been doing as well with his diet and exercise as he had previously  He notes he gained some weight back  He is active at his job   He takes his levothyroxine  200 ug daily  Energy level is okay  He has a family history of colon cancer  Grandfather at age 32 and grandmother at age 39  O: /86 (Cuff Size: Large)   Pulse 76   Resp 16   Wt (!) 146 kg (321 lb 3 2 oz)   BMI 40 15 kg/m²   BP by me is 138/84 large cuff  Physical Exam  Neck no adenopathy thyromegaly bruits  Chest clear with good breath sounds  Cardiac regular rate without murmur  Extremities trace edema    Blood work 09/30/2019    A1c 6 2   cholesterol 146 HDL 42   TSH 3 69    Assessment  1  Hypothyroidism-controlled  2  Dyspnea on exertion-again reminded about stress test and echo  3  Obesity-BMI Counseling: Body mass index is 40 15 kg/m²  The BMI is above normal  Patient referred to weight management due to patient being severely obese  4  Sleep apnea-encouraged follow-up with sleep specialist however weight loss would certainly help here   5  Pre diabetes-diet exercise and weight loss as above  6  Family history colon cancer-should have colonoscopy      Plan  Referral to bariatric weight management  Referral for colonoscopy  Referral for sleep specialist   TSH since 3 months  Recheck 6 months

## 2019-12-28 DIAGNOSIS — E03.9 ACQUIRED HYPOTHYROIDISM: ICD-10-CM

## 2019-12-29 RX ORDER — LEVOTHYROXINE SODIUM 0.03 MG/1
TABLET ORAL
Qty: 90 TABLET | Refills: 1 | Status: SHIPPED | OUTPATIENT
Start: 2019-12-29 | End: 2020-06-26

## 2019-12-30 ENCOUNTER — OFFICE VISIT (OUTPATIENT)
Dept: FAMILY MEDICINE CLINIC | Facility: MEDICAL CENTER | Age: 44
End: 2019-12-30
Payer: COMMERCIAL

## 2019-12-30 ENCOUNTER — TELEPHONE (OUTPATIENT)
Dept: FAMILY MEDICINE CLINIC | Facility: MEDICAL CENTER | Age: 44
End: 2019-12-30

## 2019-12-30 VITALS
DIASTOLIC BLOOD PRESSURE: 88 MMHG | HEIGHT: 75 IN | SYSTOLIC BLOOD PRESSURE: 148 MMHG | WEIGHT: 312.13 LBS | RESPIRATION RATE: 16 BRPM | BODY MASS INDEX: 38.81 KG/M2 | TEMPERATURE: 98.1 F | HEART RATE: 80 BPM

## 2019-12-30 DIAGNOSIS — L03.115 CELLULITIS OF RIGHT LEG: Primary | ICD-10-CM

## 2019-12-30 PROCEDURE — 99213 OFFICE O/P EST LOW 20 MIN: CPT | Performed by: FAMILY MEDICINE

## 2019-12-30 PROCEDURE — 1036F TOBACCO NON-USER: CPT | Performed by: FAMILY MEDICINE

## 2019-12-30 PROCEDURE — 3008F BODY MASS INDEX DOCD: CPT | Performed by: FAMILY MEDICINE

## 2019-12-30 RX ORDER — CLINDAMYCIN HYDROCHLORIDE 300 MG/1
300 CAPSULE ORAL 4 TIMES DAILY
Qty: 28 CAPSULE | Refills: 0 | Status: SHIPPED | OUTPATIENT
Start: 2019-12-30 | End: 2020-01-04 | Stop reason: HOSPADM

## 2019-12-30 NOTE — PROGRESS NOTES
Damari Perkins started with malaise, fever, chills, headaches 4 days ago  He started with redness in his lower right leg 1-2 days later  Leg feels tight and swollen    No appetite  No respiratory symptoms  No other rash  He said that he has had swelling in his right leg before and has had normal Dopplers  He said that he took 2 cook doses of antibiotics that he had when his right leg got somewhat swollen last month  O: /88 (Cuff Size: Large)   Pulse 80   Temp 98 1 °F (36 7 °C) (Oral)   Resp 16   Ht 6' 3" (1 905 m)   Wt (!) 142 kg (312 lb 2 oz)   BMI 39 01 kg/m²   No distress  Right lower extremity shows 2+ edema, shiny with patchy deep erythema over the anterior leg and dorsum right foot  The extremity is warm and slightly tender  Negative Dennys sign  Assessment  Cellulitis right lower extremity    Plan  Rx for clindamycin  Elevation, warm compresses, and Tylenol as needed  Call with progress tomorrow

## 2019-12-30 NOTE — TELEPHONE ENCOUNTER
Pt's wife called to request appt with Dr Dee Cardoza  Pt has had muscle aches, headache, fever (not measured) and chills X Friday  Denied cough, congestion, sob  Today, he has a rash on his leg      Routed to Clinical - please triage

## 2019-12-30 NOTE — TELEPHONE ENCOUNTER
fyi-  Triaged- ankle is deep red, swollen  Very painful to touch  Hx of gout  Also with fever, chills and body aches   appt to 2:45

## 2020-01-02 ENCOUNTER — OFFICE VISIT (OUTPATIENT)
Dept: FAMILY MEDICINE CLINIC | Facility: MEDICAL CENTER | Age: 45
End: 2020-01-02
Payer: COMMERCIAL

## 2020-01-02 ENCOUNTER — APPOINTMENT (INPATIENT)
Dept: ULTRASOUND IMAGING | Facility: HOSPITAL | Age: 45
DRG: 603 | End: 2020-01-02
Payer: COMMERCIAL

## 2020-01-02 ENCOUNTER — APPOINTMENT (INPATIENT)
Dept: CT IMAGING | Facility: HOSPITAL | Age: 45
DRG: 603 | End: 2020-01-02
Payer: COMMERCIAL

## 2020-01-02 ENCOUNTER — APPOINTMENT (EMERGENCY)
Dept: RADIOLOGY | Facility: HOSPITAL | Age: 45
DRG: 603 | End: 2020-01-02
Payer: COMMERCIAL

## 2020-01-02 ENCOUNTER — HOSPITAL ENCOUNTER (INPATIENT)
Facility: HOSPITAL | Age: 45
LOS: 2 days | Discharge: HOME | DRG: 603 | End: 2020-01-04
Attending: EMERGENCY MEDICINE | Admitting: INTERNAL MEDICINE
Payer: COMMERCIAL

## 2020-01-02 VITALS — DIASTOLIC BLOOD PRESSURE: 86 MMHG | TEMPERATURE: 98.3 F | HEART RATE: 76 BPM | SYSTOLIC BLOOD PRESSURE: 140 MMHG

## 2020-01-02 DIAGNOSIS — L03.115 CELLULITIS OF RIGHT LOWER EXTREMITY: Primary | ICD-10-CM

## 2020-01-02 DIAGNOSIS — R74.8 ELEVATED ALKALINE PHOSPHATASE LEVEL: ICD-10-CM

## 2020-01-02 DIAGNOSIS — D72.829 LEUKOCYTOSIS: ICD-10-CM

## 2020-01-02 PROBLEM — G89.29 CHRONIC BACK PAIN: Status: ACTIVE | Noted: 2020-01-02

## 2020-01-02 PROBLEM — M54.9 CHRONIC BACK PAIN: Status: ACTIVE | Noted: 2020-01-02

## 2020-01-02 PROBLEM — R11.2 NAUSEA & VOMITING: Status: ACTIVE | Noted: 2020-01-02

## 2020-01-02 LAB
ALBUMIN SERPL BCP-MCNC: 2.8 G/DL (ref 3.5–5)
ALP SERPL-CCNC: 181 U/L (ref 46–116)
ALT SERPL W P-5'-P-CCNC: 51 U/L (ref 12–78)
ANION GAP SERPL CALCULATED.3IONS-SCNC: 9 MMOL/L (ref 4–13)
AST SERPL W P-5'-P-CCNC: 32 U/L (ref 5–45)
BASOPHILS # BLD MANUAL: 0 THOUSAND/UL (ref 0–0.1)
BASOPHILS NFR MAR MANUAL: 0 % (ref 0–1)
BILIRUB SERPL-MCNC: 0.23 MG/DL (ref 0.2–1)
BUN SERPL-MCNC: 23 MG/DL (ref 5–25)
CALCIUM SERPL-MCNC: 9 MG/DL (ref 8.3–10.1)
CHLORIDE SERPL-SCNC: 102 MMOL/L (ref 100–108)
CO2 SERPL-SCNC: 29 MMOL/L (ref 21–32)
CREAT SERPL-MCNC: 1.28 MG/DL (ref 0.6–1.3)
EOSINOPHIL # BLD MANUAL: 0.1 THOUSAND/UL (ref 0–0.4)
EOSINOPHIL NFR BLD MANUAL: 1 % (ref 0–6)
ERYTHROCYTE [DISTWIDTH] IN BLOOD BY AUTOMATED COUNT: 13.4 % (ref 11.6–15.1)
GFR SERPL CREATININE-BSD FRML MDRD: 68 ML/MIN/1.73SQ M
GLUCOSE SERPL-MCNC: 109 MG/DL (ref 65–140)
HCT VFR BLD AUTO: 39.3 % (ref 36.5–49.3)
HGB BLD-MCNC: 12.6 G/DL (ref 12–17)
LYMPHOCYTES # BLD AUTO: 29 % (ref 14–44)
LYMPHOCYTES # BLD AUTO: 3.01 THOUSAND/UL (ref 0.6–4.47)
MCH RBC QN AUTO: 27.4 PG (ref 26.8–34.3)
MCHC RBC AUTO-ENTMCNC: 32.1 G/DL (ref 31.4–37.4)
MCV RBC AUTO: 85 FL (ref 82–98)
MONOCYTES # BLD AUTO: 0.21 THOUSAND/UL (ref 0–1.22)
MONOCYTES NFR BLD: 2 % (ref 4–12)
NEUTROPHILS # BLD MANUAL: 7.05 THOUSAND/UL (ref 1.85–7.62)
NEUTS BAND NFR BLD MANUAL: 1 % (ref 0–8)
NEUTS SEG NFR BLD AUTO: 67 % (ref 43–75)
NRBC BLD AUTO-RTO: 0 /100 WBCS
PLATELET # BLD AUTO: 267 THOUSANDS/UL (ref 149–390)
PLATELET BLD QL SMEAR: ADEQUATE
PMV BLD AUTO: 9.8 FL (ref 8.9–12.7)
POTASSIUM SERPL-SCNC: 3.7 MMOL/L (ref 3.5–5.3)
PROT SERPL-MCNC: 7.4 G/DL (ref 6.4–8.2)
RBC # BLD AUTO: 4.6 MILLION/UL (ref 3.88–5.62)
RBC MORPH BLD: NORMAL
SODIUM SERPL-SCNC: 140 MMOL/L (ref 136–145)
TOTAL CELLS COUNTED SPEC: 100
WBC # BLD AUTO: 10.37 THOUSAND/UL (ref 4.31–10.16)

## 2020-01-02 PROCEDURE — 73610 X-RAY EXAM OF ANKLE: CPT

## 2020-01-02 PROCEDURE — 93971 EXTREMITY STUDY: CPT

## 2020-01-02 PROCEDURE — 73701 CT LOWER EXTREMITY W/DYE: CPT

## 2020-01-02 PROCEDURE — 99284 EMERGENCY DEPT VISIT MOD MDM: CPT

## 2020-01-02 PROCEDURE — 85027 COMPLETE CBC AUTOMATED: CPT | Performed by: PHYSICIAN ASSISTANT

## 2020-01-02 PROCEDURE — 36415 COLL VENOUS BLD VENIPUNCTURE: CPT | Performed by: PHYSICIAN ASSISTANT

## 2020-01-02 PROCEDURE — 73590 X-RAY EXAM OF LOWER LEG: CPT

## 2020-01-02 PROCEDURE — 99213 OFFICE O/P EST LOW 20 MIN: CPT | Performed by: FAMILY MEDICINE

## 2020-01-02 PROCEDURE — 99223 1ST HOSP IP/OBS HIGH 75: CPT | Performed by: INTERNAL MEDICINE

## 2020-01-02 PROCEDURE — 99285 EMERGENCY DEPT VISIT HI MDM: CPT | Performed by: PHYSICIAN ASSISTANT

## 2020-01-02 PROCEDURE — 80053 COMPREHEN METABOLIC PANEL: CPT | Performed by: PHYSICIAN ASSISTANT

## 2020-01-02 PROCEDURE — 85007 BL SMEAR W/DIFF WBC COUNT: CPT | Performed by: PHYSICIAN ASSISTANT

## 2020-01-02 PROCEDURE — 87040 BLOOD CULTURE FOR BACTERIA: CPT | Performed by: INTERNAL MEDICINE

## 2020-01-02 RX ORDER — OXYCODONE HCL 40 MG/1
40 TABLET, FILM COATED, EXTENDED RELEASE ORAL EVERY 12 HOURS PRN
Status: DISCONTINUED | OUTPATIENT
Start: 2020-01-02 | End: 2020-01-02

## 2020-01-02 RX ORDER — ONDANSETRON 2 MG/ML
4 INJECTION INTRAMUSCULAR; INTRAVENOUS EVERY 6 HOURS PRN
Status: DISCONTINUED | OUTPATIENT
Start: 2020-01-02 | End: 2020-01-04 | Stop reason: HOSPADM

## 2020-01-02 RX ORDER — SODIUM CHLORIDE 9 MG/ML
75 INJECTION, SOLUTION INTRAVENOUS CONTINUOUS
Status: DISCONTINUED | OUTPATIENT
Start: 2020-01-02 | End: 2020-01-03

## 2020-01-02 RX ORDER — LEVOTHYROXINE SODIUM 0.1 MG/1
200 TABLET ORAL
Status: DISCONTINUED | OUTPATIENT
Start: 2020-01-03 | End: 2020-01-02

## 2020-01-02 RX ORDER — OXYCODONE HCL 40 MG/1
40 TABLET, FILM COATED, EXTENDED RELEASE ORAL EVERY 8 HOURS SCHEDULED
Status: DISCONTINUED | OUTPATIENT
Start: 2020-01-02 | End: 2020-01-04 | Stop reason: HOSPADM

## 2020-01-02 RX ORDER — LEVOTHYROXINE SODIUM 0.03 MG/1
25 TABLET ORAL
Status: DISCONTINUED | OUTPATIENT
Start: 2020-01-03 | End: 2020-01-02

## 2020-01-02 RX ORDER — ACETAMINOPHEN 325 MG/1
650 TABLET ORAL EVERY 6 HOURS PRN
Status: DISCONTINUED | OUTPATIENT
Start: 2020-01-02 | End: 2020-01-04 | Stop reason: HOSPADM

## 2020-01-02 RX ADMIN — VANCOMYCIN HYDROCHLORIDE 1500 MG: 1 INJECTION, POWDER, LYOPHILIZED, FOR SOLUTION INTRAVENOUS at 20:21

## 2020-01-02 RX ADMIN — MORPHINE SULFATE 2 MG: 2 INJECTION, SOLUTION INTRAMUSCULAR; INTRAVENOUS at 22:34

## 2020-01-02 RX ADMIN — OXYCODONE HYDROCHLORIDE 40 MG: 40 TABLET, FILM COATED, EXTENDED RELEASE ORAL at 23:42

## 2020-01-02 RX ADMIN — IOHEXOL 100 ML: 350 INJECTION, SOLUTION INTRAVENOUS at 23:16

## 2020-01-02 RX ADMIN — OXYCODONE HYDROCHLORIDE 15 MG: 10 TABLET ORAL at 19:21

## 2020-01-02 RX ADMIN — SODIUM CHLORIDE 75 ML/HR: 0.9 INJECTION, SOLUTION INTRAVENOUS at 18:38

## 2020-01-02 NOTE — ASSESSMENT & PLAN NOTE
Patient was complaining of nausea vomiting and loss of appetite  · Will place patient on Zofran 4 mg IV p r n   For nausea

## 2020-01-02 NOTE — H&P
H&P- Sadie Diofabian 1975, 40 y o  male MRN: [id number]    Unit/Bed#: W -01 Encounter: [id number]    Primary Care Provider: Marlene Patel MD   Date and time admitted to hospital: 1/2/2020  3:52 PM        * Cellulitis of right lower extremity  Assessment & Plan  Patient reported noticing leg swelling of her right lower extremity that started Sunday, had redness, warmth, denies any pain  Did not notice any insect bite or scratch but did report that he has history of athlete's foot and scratches his legs  Patient saw his primary care physician on Monday and was prescribed for clindamycin 300 mg q i d  Which he took but did not see any improvement  Assessment:  Cellulitis  · Despite patient being on clindamycin we saw no improvement, will place patient on IV vancomycin with pharmacy consult  · CT of the right lower leg with contrast, will place patient on IV fluid normal saline at 75 for now due to IV contrast  · CBC in a m  · CMP in a m  · Blood cultures x2  · Procalcitonin in a m  Hypothyroidism  Assessment & Plan  Patient has a history of hypothyroidism  · Resume levothyroxine to 225 mcg per day    Nausea & vomiting  Assessment & Plan  Patient was complaining of nausea vomiting and loss of appetite  · Will place patient on Zofran 4 mg IV p r n  For nausea    Chronic back pain  Assessment & Plan  Patient has a chronic back pain which he takes opioids  PDMP was reviewed patient usually takes OxyContin and oxycodone  · Will resume home medication OxyContin 15 mg every 4 hours p r n  For pain  · Will resume home medication oxycodone 40 mg every 8 hours        VTE Prophylaxis: Low VTE score ambulate  / reason for no mechanical VTE prophylaxis Low VTE score ambulate   Code Status:  Full code  POLST: POLST form is not discussed and not completed at this time  Anticipated Length of Stay:  Patient will be admitted on an Inpatient basis with an anticipated length of stay of   2 midnights  Justification for Hospital Stay:  Will require IV antibiotics    Chief Complaint:   Right lower leg swelling    History of Present Illness:    Yessi Anderson is a 40 y o  male who presents with worsening right lower leg swelling  Patient has a past medical history of hypothyroidism and chronic back pain  Patient reported having fever, chills and severe body aches with shortness of breath that started Friday, this symptoms continued until Monday so he decided to see his primary care physician which she was prescribed with clindamycin 300 mg q i d  He notice improvement with his shortness of breath and fever and chills and body aches but noticed that the legs is worsening  Patient continue to take his p o  Medication but despite taking it his right lower leg swell significantly in 1 day, he complained of warmth redness but no pain on the leg  Patient also reported nausea vomiting and appetite change but denies any cough, and shortness of breath or chest pain at this time  Review of Systems:    Review of Systems   Constitutional: Positive for activity change, appetite change, chills, fatigue and fever  Negative for diaphoresis and unexpected weight change  HENT: Negative  Eyes: Negative  Respiratory: Positive for shortness of breath (No longer has it but had shortness of breath during Friday)  Negative for apnea, cough, choking, chest tightness, wheezing and stridor  Cardiovascular: Positive for leg swelling  Gastrointestinal: Negative  Endocrine: Negative  Genitourinary: Negative  Musculoskeletal: Positive for back pain and myalgias  Neurological: Negative  Past Medical and Surgical History:     History reviewed  No pertinent past medical history      Past Surgical History:   Procedure Laterality Date    APPENDECTOMY      RESOLVED: 2000    CHOLECYSTECTOMY      RESOLVED: 2004    CHOLECYSTECTOMY      LUMBAR LAMINECTOMY      UMBILICAL HERNIA REPAIR      RESOLVED: 2013    UMBILICAL HERNIA REPAIR         Meds/Allergies:    Prior to Admission medications    Medication Sig Start Date End Date Taking? Authorizing Provider   clindamycin (CLEOCIN) 300 MG capsule Take 1 capsule (300 mg total) by mouth 4 (four) times a day for 7 days 12/30/19 1/6/20 Yes Juan Leger MD   levothyroxine 200 mcg tablet TAKE 1 TABLET (200 MCG TOTAL) BY MOUTH DAILY AS DIRECTED 8/25/19  Yes Juan Leger MD   levothyroxine 25 mcg tablet TAKE 1 TABLET BY MOUTH EVERY DAY 12/29/19  Yes Juan Leger MD   oxyCODONE (OXYCONTIN) 40 mg 12 hr tablet  12/31/15  Yes Historical Provider, MD   oxyCODONE (ROXICODONE) 15 mg immediate release tablet  12/31/15  Yes Historical Provider, MD     I have reviewed home medications with patient personally  Allergies: No Known Allergies    Social History:     Marital Status: /Civil Union   Occupation:  Patient job involves a lot of walking so DVT is unlikely    Substance Use History:   Social History     Substance and Sexual Activity   Alcohol Use No     Social History     Tobacco Use   Smoking Status Former Smoker   Smokeless Tobacco Never Used     Social History     Substance and Sexual Activity   Drug Use No       Family History:    Family History   Problem Relation Age of Onset    Cirrhosis Mother         HEPATIC    Cirrhosis Father         HEPATIC       Physical Exam:     Vitals:   Blood Pressure: 152/90 (01/02/20 1526)  Pulse: 70 (01/02/20 1526)  Temperature: 98 3 °F (36 8 °C) (01/02/20 1526)  Temp Source: Oral (01/02/20 1526)  Respirations: 18 (01/02/20 1526)  SpO2: 92 % (01/02/20 1526)    Physical Exam   Constitutional: He is oriented to person, place, and time  He appears well-developed and well-nourished  HENT:   Head: Normocephalic  Eyes: Pupils are equal, round, and reactive to light  Neck: Normal range of motion  Cardiovascular: Normal rate, regular rhythm, normal heart sounds and intact distal pulses     Pulmonary/Chest: Effort normal and breath sounds normal    Abdominal: Soft  Bowel sounds are normal    Musculoskeletal: Normal range of motion  He exhibits edema (Right leg swelling, with trace edema on the left leg)  Neurological: He is alert and oriented to person, place, and time  Skin: Skin is warm and dry  Additional Data:     Lab Results: I have personally reviewed pertinent reports  Results from last 7 days   Lab Units 01/02/20  1619   WBC Thousand/uL 10 37*   HEMOGLOBIN g/dL 12 6   HEMATOCRIT % 39 3   PLATELETS Thousands/uL 267   LYMPHO PCT % 29   MONO PCT % 2*   EOS PCT % 1     Results from last 7 days   Lab Units 01/02/20  1619   POTASSIUM mmol/L 3 7   CHLORIDE mmol/L 102   CO2 mmol/L 29   BUN mg/dL 23   CREATININE mg/dL 1 28   CALCIUM mg/dL 9 0   ALK PHOS U/L 181*   ALT U/L 51   AST U/L 32           Imaging: I have personally reviewed pertinent reports  Xr Tibia Fibula 2 Views Right    Result Date: 1/2/2020  Narrative: RIGHT TIBIA AND FIBULA INDICATION:   Cellulitis, R/O Osteomyelitis  COMPARISON:  None VIEWS:  XR TIBIA FIBULA 2 VW RIGHT, XR ANKLE 3+ VW RIGHT FINDINGS: Significant smooth periosteal reaction along the mid to distal fibular diaphysis more pronounced along the posterior aspect, suspicious for early acute osteoarthritis  No bony erosions  There is no acute fracture or dislocation  Mild degenerative changes of the knee joint  No lytic or blastic lesions are seen  Diffuse soft tissue swelling  Impression: Significant smooth periosteal reaction along the mid to distal fibular diaphysis suspicious for early acute osteoarthritis  No bony erosions  Diffuse soft tissue swelling  The study was marked in [facility] for immediate notification  Workstation performed: [id number]     Xr Ankle 3+ Views Right    Result Date: 1/2/2020  Narrative: RIGHT TIBIA AND FIBULA INDICATION:   Cellulitis, R/O Osteomyelitis   COMPARISON:  None VIEWS:  XR TIBIA FIBULA 2 VW RIGHT, XR ANKLE 3+ VW RIGHT FINDINGS: Significant smooth periosteal reaction along the mid to distal fibular diaphysis more pronounced along the posterior aspect, suspicious for early acute osteoarthritis  No bony erosions  There is no acute fracture or dislocation  Mild degenerative changes of the knee joint  No lytic or blastic lesions are seen  Diffuse soft tissue swelling  Impression: Significant smooth periosteal reaction along the mid to distal fibular diaphysis suspicious for early acute osteoarthritis  No bony erosions  Diffuse soft tissue swelling  The study was marked in [facility] for immediate notification  Workstation performed: [id number]           [facility] Everywhere Records Reviewed: Yes     ** Please Note: This note has been constructed using a voice recognition system   **

## 2020-01-02 NOTE — PROGRESS NOTES
Vancomycin Assessment    Chandu Willis is a 40 y o  male who is currently receiving vancomycin 2250mg IV q12h for skin-soft tissue infection     Relevant clinical data and objective history reviewed:  Creatinine   Date Value Ref Range Status   01/02/2020 1 28 0 60 - 1 30 mg/dL Final     Comment:     Standardized to IDMS reference method   09/30/2019 1 15 0 60 - 1 30 mg/dL Final     Comment:     Standardized to IDMS reference method   03/30/2018 1 13 0 60 - 1 30 mg/dL Final     Comment:     Standardized to IDMS reference method   05/20/2014 1 11 0 60 - 1 30 mg/dL Final     Comment:     Standardized to IDMS reference method     /90 (BP Location: Right arm)   Pulse 70   Temp 98 3 °F (36 8 °C) (Oral)   Resp 18   SpO2 92%   No intake/output data recorded  Lab Results   Component Value Date/Time    BUN 23 01/02/2020 04:19 PM    BUN 17 05/20/2014 12:48 PM    WBC 10 37 (H) 01/02/2020 04:19 PM    WBC 8 62 05/20/2014 12:48 PM    HGB 12 6 01/02/2020 04:19 PM    HGB 14 3 05/20/2014 12:48 PM    HCT 39 3 01/02/2020 04:19 PM    HCT 43 8 05/20/2014 12:48 PM    MCV 85 01/02/2020 04:19 PM    MCV 87 05/20/2014 12:48 PM     01/02/2020 04:19 PM     05/20/2014 12:48 PM     Temp Readings from Last 3 Encounters:   01/02/20 98 3 °F (36 8 °C) (Oral)   01/02/20 98 3 °F (36 8 °C)   12/30/19 98 1 °F (36 7 °C) (Oral)     Vancomycin Days of Therapy: 1    Assessment/Plan  The patient is currently on vancomycin utilizing scheduled dosing based on adjusted body weight (due to obesity)  Baseline risks associated with therapy include: concomitant nephrotoxic medications  The patient is currently receiving 2250mg IV q12h and after clinical evaluation will be changed to 1500mg IV q8h  Pharmacy will also follow closely for s/sx of nephrotoxicity, infusion reactions and appropriateness of therapy  BMP and CBC will be ordered per protocol    Plan for trough as patient approaches steady state, prior to the 4th  dose at approximately 1800 on 1/3  Due to infection severity, will target a trough of 15-20 (appropriate for most indications)   Pharmacy will continue to follow the patients culture results and clinical progress daily      Tisha Martínez, Pharmacist

## 2020-01-02 NOTE — ED PROVIDER NOTES
History  Chief Complaint   Patient presents with    Cellulitis     R foot swelling, cellulitis since last Friday  Antibiotics since Monday, Clindaymycin  Yessi Anderson is a 40 y o  male with past medical history of hypothyroidism who presents to the ED with complaints of right lower extremity swelling, redness and warmth beginning on Monday  Patient reports body aches and fever on Friday, Saturday and Sunday  Patient reports he noticed redness to the dorsal aspect of the foot on Monday and presented to his family PCP  Patient was placed on Clindamycin which he has been taking QID as prescribed  Patient states swelling, redness and pain are worsening  Patient reports pain is worse with ambulation and he feels "tightness" of his anterior leg  Patient reports approximately 1 week ago he did fall in the snow and injured his lateral right hip  Patient reports "knot and bruise" formed of the right him which has improved  Patient states he does have a history of athlete's foot  Denies previous hx of MRSA infections or surgeries of the RLE  History provided by:  Patient      Prior to Admission Medications   Prescriptions Last Dose Informant Patient Reported? Taking? clindamycin (CLEOCIN) 300 MG capsule   No Yes   Sig: Take 1 capsule (300 mg total) by mouth 4 (four) times a day for 7 days   levothyroxine 200 mcg tablet   No Yes   Sig: TAKE 1 TABLET (200 MCG TOTAL) BY MOUTH DAILY AS DIRECTED   levothyroxine 25 mcg tablet   No Yes   Sig: TAKE 1 TABLET BY MOUTH EVERY DAY   oxyCODONE (OXYCONTIN) 40 mg 12 hr tablet   Yes Yes   oxyCODONE (ROXICODONE) 15 mg immediate release tablet   Yes Yes      Facility-Administered Medications: None       History reviewed  No pertinent past medical history      Past Surgical History:   Procedure Laterality Date    APPENDECTOMY      RESOLVED: 2000    CHOLECYSTECTOMY      RESOLVED: 2004    CHOLECYSTECTOMY      LUMBAR LAMINECTOMY      UMBILICAL HERNIA REPAIR      RESOLVED: 2013  UMBILICAL HERNIA REPAIR         Family History   Problem Relation Age of Onset    Cirrhosis Mother         HEPATIC    Cirrhosis Father         HEPATIC     I have reviewed and agree with the history as documented  Social History     Tobacco Use    Smoking status: Former Smoker    Smokeless tobacco: Never Used   Substance Use Topics    Alcohol use: No    Drug use: No        Review of Systems   Constitutional: Positive for fatigue (Resolved) and fever (Resolved)  Negative for appetite change, chills and unexpected weight change  HENT: Negative for congestion, drooling, ear pain, rhinorrhea, sore throat, trouble swallowing and voice change  Eyes: Negative for pain, discharge, redness and visual disturbance  Respiratory: Negative for cough, shortness of breath, wheezing and stridor  Cardiovascular: Negative for chest pain, palpitations and leg swelling  Gastrointestinal: Negative for abdominal pain, blood in stool, constipation, diarrhea, nausea and vomiting  Genitourinary: Negative for dysuria, flank pain, frequency, hematuria and urgency  Musculoskeletal: Positive for arthralgias and myalgias (Resolved)  Negative for gait problem, joint swelling, neck pain and neck stiffness  Skin: Positive for color change  Negative for rash  Neurological: Negative for dizziness, seizures, light-headedness and headaches  Physical Exam  Physical Exam   Constitutional: He is oriented to person, place, and time  HENT:   Head: Normocephalic and atraumatic  Nose: Nose normal    Mouth/Throat: Oropharynx is clear and moist    Eyes: Pupils are equal, round, and reactive to light  Conjunctivae and EOM are normal    Neck: Normal range of motion  Neck supple  Cardiovascular: Normal rate, regular rhythm and intact distal pulses  Pulmonary/Chest: Effort normal and breath sounds normal    Musculoskeletal: Normal range of motion     2+ pitting edema of the right lower extremity from the dorsal aspect of the right foot to the anterior aspect of the right leg  Small areas of hyperpigmentation, no drainage  Flaking dry the skin of the plantar aspect of the toes  Pain elicited with plantar flexion  Soft compartments  NVI  Neurological: He is alert and oriented to person, place, and time  Skin: Skin is warm and dry  Capillary refill takes less than 2 seconds  Nursing note and vitals reviewed        Vital Signs  ED Triage Vitals [01/02/20 1526]   Temperature Pulse Respirations Blood Pressure SpO2   98 3 °F (36 8 °C) 70 18 152/90 92 %      Temp Source Heart Rate Source Patient Position - Orthostatic VS BP Location FiO2 (%)   Oral Monitor Sitting Right arm --      Pain Score       No Pain           Vitals:    01/02/20 1526   BP: 152/90   Pulse: 70   Patient Position - Orthostatic VS: Sitting         Visual Acuity      ED Medications  Medications - No data to display    Diagnostic Studies  Results Reviewed     Procedure Component Value Units Date/Time    Comprehensive metabolic panel [[id number]]  (Abnormal) Collected:  01/02/20 1619    Lab Status:  Final result Specimen:  Blood from Arm, Right Updated:  01/02/20 1653     Sodium 140 mmol/L      Potassium 3 7 mmol/L      Chloride 102 mmol/L      CO2 29 mmol/L      ANION GAP 9 mmol/L      BUN 23 mg/dL      Creatinine 1 28 mg/dL      Glucose 109 mg/dL      Calcium 9 0 mg/dL      AST 32 U/L      ALT 51 U/L      Alkaline Phosphatase 181 U/L      Total Protein 7 4 g/dL      Albumin 2 8 g/dL      Total Bilirubin 0 23 mg/dL      eGFR 68 ml/min/1 73sq m     Narrative:       [place] guidelines for Chronic Kidney Disease (CKD):     Stage 1 with normal or high GFR (GFR > 90 mL/min/1 73 square meters)    Stage 2 Mild CKD (GFR = 60-89 mL/min/1 73 square meters)    Stage 3A Moderate CKD (GFR = 45-59 mL/min/1 73 square meters)    Stage 3B Moderate CKD (GFR = 30-44 mL/min/1 73 square meters)    Stage 4 Severe CKD (GFR = 15-29 mL/min/1 73 square meters)    Stage 5 End Stage CKD (GFR <15 mL/min/1 73 square meters)  Note: GFR calculation is accurate only with a steady state creatinine    CBC and differential [[id number]]  (Abnormal) Collected:  01/02/20 1619    Lab Status:  Preliminary result Specimen:  Blood from Arm, Right Updated:  01/02/20 1637     WBC 10 37 Thousand/uL      RBC 4 60 Million/uL      Hemoglobin 12 6 g/dL      Hematocrit 39 3 %      MCV 85 fL      MCH 27 4 pg      MCHC 32 1 g/dL      RDW 13 4 %      MPV 9 8 fL      Platelets 143 Thousands/uL                  XR tibia fibula 2 views RIGHT   Final Result by Daniel Farley MD (01/02 1637)      Significant smooth periosteal reaction along the mid to distal fibular diaphysis suspicious for early acute osteoarthritis  No bony erosions  Diffuse soft tissue swelling  The study was marked in [facility] for immediate notification  Workstation performed: [id number]         XR ankle 3+ views RIGHT   Final Result by Daniel Farley MD (01/02 1637)      Significant smooth periosteal reaction along the mid to distal fibular diaphysis suspicious for early acute osteoarthritis  No bony erosions  Diffuse soft tissue swelling  The study was marked in [facility] for immediate notification  Workstation performed: [id number]                    Procedures  Procedures         ED Course  ED Course as of Jan 02 1654   Thu Jan 02, 2020 1649 Case was discussed with Dr Gabbi Boyer who is agreeable to inpatient admission  MDM  Number of Diagnoses or Management Options  Cellulitis of right lower extremity: new and requires workup  Leukocytosis: new and requires workup  Diagnosis management comments: Discussed with SLIM  We had a detailed discussion of the patient's condition and case, including need for admission   Accepts to his/her service   Bed request/bridging orders placed          Amount and/or Complexity of Data Reviewed  Clinical lab tests: reviewed and ordered  Tests in the radiology section of CPT®: ordered and reviewed  Review and summarize past medical records: yes    Risk of Complications, Morbidity, and/or Mortality  Presenting problems: moderate  Diagnostic procedures: moderate  Management options: high    Patient Progress  Patient progress: stable        Disposition  Final diagnoses:   Cellulitis of right lower extremity - Failed outpatient antibiotics   Leukocytosis   Elevated alkaline phosphatase level     Time reflects when diagnosis was documented in both MDM as applicable and the Disposition within this note     Time User Action Codes Description Comment    1/2/2020  4:08 PM Lavelle Bain [[id number]] Cellulitis of right lower extremity     1/2/2020  4:43 PM [address] [place] [address], [facility] Arsh Li [[id number]] Cellulitis of right lower extremity Failed outpatient antibiotics    1/2/2020  4:43 PM Lavelle Bain [[id number]] Leukocytosis     1/2/2020  4:54 PM Bernyia Monk Bain [[id number]] Elevated alkaline phosphatase level       ED Disposition     ED Disposition Condition Date/Time Comment    Admit Stable Thu Jan 2, 2020  4:48 PM Case was discussed with Dr Nathan Suarez and the patient's admission status was agreed to be Admission Status: inpatient status to the service of Dr Nathan Suarez  Follow-up Information    None         Patient's Medications   Discharge Prescriptions    No medications on file     No discharge procedures on file      ED Provider  Electronically Signed by           Cliff Ren PA-C  01/02/20 2080

## 2020-01-02 NOTE — ASSESSMENT & PLAN NOTE
Patient has a chronic back pain which he takes opioids  PDMP was reviewed patient usually takes OxyContin and oxycodone  · Will resume home medication OxyContin 15 mg every 4 hours p r n   For pain  · Will resume home medication oxycodone 40 mg every 8 hours

## 2020-01-02 NOTE — ASSESSMENT & PLAN NOTE
Patient reported noticing leg swelling of her right lower extremity that started Sunday, had redness, warmth, denies any pain  Did not notice any insect bite or scratch but did report that he has history of athlete's foot and scratches his legs  Patient saw his primary care physician on Monday and was prescribed for clindamycin 300 mg q i d  Which he took but did not see any improvement  X-ray of the tibia right leg:Significant smooth periosteal reaction along the mid to distal fibular diaphysis suspicious for early acute osteoarthritis   No bony erosions  Diffuse soft tissue swelling +  X-ray of the ankle right:Significant smooth periosteal reaction along the mid to distal fibular diaphysis suspicious for early acute osteoarthritis   No bony erosions  Diffuse soft tissue swelling  Assessment:  Cellulitis  · Despite patient being on clindamycin we saw no improvement, will place patient on IV vancomycin with pharmacy consult  · CT of the right lower leg with contrast, will place patient on IV fluid normal saline at 75 for now due to IV contrast  · CBC in a m  · CMP in a m  · Blood cultures x2  · Procalcitonin in a m

## 2020-01-02 NOTE — LETTER
[address] SURGICAL  [address] [facility] [address]  Dept: [phone]    January 4, 2020     Patient: Mehreen Barclay   YOB: 1975   Date of Visit: 1/2/2020       To Whom it May Concern:    Mehreen Barclay is under my professional care  He was seen in the hospital from 1/2/2020   to 01/04/20  He may return to work when cleared by his primary care physician  If you have any questions or concerns, please don't hesitate to call           Sincerely,          Mk Sanchez MD

## 2020-01-03 PROBLEM — R11.2 NAUSEA & VOMITING: Status: RESOLVED | Noted: 2020-01-02 | Resolved: 2020-01-03

## 2020-01-03 LAB
ALBUMIN SERPL BCP-MCNC: 2.5 G/DL (ref 3.5–5)
ALP SERPL-CCNC: 149 U/L (ref 46–116)
ALT SERPL W P-5'-P-CCNC: 41 U/L (ref 12–78)
ANION GAP SERPL CALCULATED.3IONS-SCNC: 6 MMOL/L (ref 4–13)
AST SERPL W P-5'-P-CCNC: 22 U/L (ref 5–45)
BASOPHILS # BLD AUTO: 0.05 THOUSANDS/ΜL (ref 0–0.1)
BASOPHILS NFR BLD AUTO: 1 % (ref 0–1)
BILIRUB SERPL-MCNC: 0.21 MG/DL (ref 0.2–1)
BUN SERPL-MCNC: 17 MG/DL (ref 5–25)
CALCIUM SERPL-MCNC: 8.9 MG/DL (ref 8.3–10.1)
CHLORIDE SERPL-SCNC: 104 MMOL/L (ref 100–108)
CO2 SERPL-SCNC: 29 MMOL/L (ref 21–32)
CREAT SERPL-MCNC: 1.12 MG/DL (ref 0.6–1.3)
EOSINOPHIL # BLD AUTO: 0.17 THOUSAND/ΜL (ref 0–0.61)
EOSINOPHIL NFR BLD AUTO: 2 % (ref 0–6)
ERYTHROCYTE [DISTWIDTH] IN BLOOD BY AUTOMATED COUNT: 13.2 % (ref 11.6–15.1)
GFR SERPL CREATININE-BSD FRML MDRD: 79 ML/MIN/1.73SQ M
GLUCOSE SERPL-MCNC: 100 MG/DL (ref 65–140)
HCT VFR BLD AUTO: 35.1 % (ref 36.5–49.3)
HGB BLD-MCNC: 11.3 G/DL (ref 12–17)
IMM GRANULOCYTES # BLD AUTO: >0.5 THOUSAND/UL (ref 0–0.2)
IMM GRANULOCYTES NFR BLD AUTO: 6 % (ref 0–2)
LYMPHOCYTES # BLD AUTO: 3.13 THOUSANDS/ΜL (ref 0.6–4.47)
LYMPHOCYTES NFR BLD AUTO: 31 % (ref 14–44)
MCH RBC QN AUTO: 27.2 PG (ref 26.8–34.3)
MCHC RBC AUTO-ENTMCNC: 32.2 G/DL (ref 31.4–37.4)
MCV RBC AUTO: 85 FL (ref 82–98)
MONOCYTES # BLD AUTO: 0.88 THOUSAND/ΜL (ref 0.17–1.22)
MONOCYTES NFR BLD AUTO: 9 % (ref 4–12)
NEUTROPHILS # BLD AUTO: 5.37 THOUSANDS/ΜL (ref 1.85–7.62)
NEUTS SEG NFR BLD AUTO: 51 % (ref 43–75)
NRBC BLD AUTO-RTO: 0 /100 WBCS
PLATELET # BLD AUTO: 246 THOUSANDS/UL (ref 149–390)
PMV BLD AUTO: 9.4 FL (ref 8.9–12.7)
POTASSIUM SERPL-SCNC: 3.5 MMOL/L (ref 3.5–5.3)
PROCALCITONIN SERPL-MCNC: 0.2 NG/ML
PROT SERPL-MCNC: 6.7 G/DL (ref 6.4–8.2)
RBC # BLD AUTO: 4.15 MILLION/UL (ref 3.88–5.62)
SODIUM SERPL-SCNC: 139 MMOL/L (ref 136–145)
WBC # BLD AUTO: 10.23 THOUSAND/UL (ref 4.31–10.16)

## 2020-01-03 PROCEDURE — 93971 EXTREMITY STUDY: CPT | Performed by: SURGERY

## 2020-01-03 PROCEDURE — 85025 COMPLETE CBC W/AUTO DIFF WBC: CPT | Performed by: INTERNAL MEDICINE

## 2020-01-03 PROCEDURE — 80053 COMPREHEN METABOLIC PANEL: CPT | Performed by: INTERNAL MEDICINE

## 2020-01-03 PROCEDURE — 99232 SBSQ HOSP IP/OBS MODERATE 35: CPT | Performed by: HOSPITALIST

## 2020-01-03 PROCEDURE — 84145 PROCALCITONIN (PCT): CPT | Performed by: INTERNAL MEDICINE

## 2020-01-03 RX ORDER — CEFAZOLIN SODIUM 2 G/50ML
2000 SOLUTION INTRAVENOUS EVERY 8 HOURS
Status: DISCONTINUED | OUTPATIENT
Start: 2020-01-03 | End: 2020-01-04 | Stop reason: HOSPADM

## 2020-01-03 RX ADMIN — CEFAZOLIN SODIUM 2000 MG: 2 SOLUTION INTRAVENOUS at 16:26

## 2020-01-03 RX ADMIN — CEFAZOLIN SODIUM 2000 MG: 2 SOLUTION INTRAVENOUS at 10:41

## 2020-01-03 RX ADMIN — OXYCODONE HYDROCHLORIDE 15 MG: 10 TABLET ORAL at 07:37

## 2020-01-03 RX ADMIN — VANCOMYCIN HYDROCHLORIDE 1500 MG: 1 INJECTION, POWDER, LYOPHILIZED, FOR SOLUTION INTRAVENOUS at 04:14

## 2020-01-03 RX ADMIN — OXYCODONE HYDROCHLORIDE 15 MG: 10 TABLET ORAL at 16:26

## 2020-01-03 RX ADMIN — LEVOTHYROXINE SODIUM 225 MCG: 175 TABLET ORAL at 06:06

## 2020-01-03 RX ADMIN — OXYCODONE HYDROCHLORIDE 40 MG: 40 TABLET, FILM COATED, EXTENDED RELEASE ORAL at 14:36

## 2020-01-03 RX ADMIN — OXYCODONE HYDROCHLORIDE 15 MG: 10 TABLET ORAL at 20:26

## 2020-01-03 RX ADMIN — OXYCODONE HYDROCHLORIDE 15 MG: 10 TABLET ORAL at 02:11

## 2020-01-03 RX ADMIN — MAGNESIUM HYDROXIDE 30 ML: 400 SUSPENSION ORAL at 20:26

## 2020-01-03 RX ADMIN — OXYCODONE HYDROCHLORIDE 15 MG: 10 TABLET ORAL at 11:51

## 2020-01-03 RX ADMIN — OXYCODONE HYDROCHLORIDE 40 MG: 40 TABLET, FILM COATED, EXTENDED RELEASE ORAL at 06:07

## 2020-01-03 RX ADMIN — OXYCODONE HYDROCHLORIDE 40 MG: 40 TABLET, FILM COATED, EXTENDED RELEASE ORAL at 22:29

## 2020-01-03 NOTE — UTILIZATION REVIEW
Initial Clinical Review    Admission: Date/Time/Statement: Inpatient Admission Orders (From admission, onward)     Ordered        01/02/20 1649  Inpatient Admission (expected length of stay for this patient Order details is greater than two midnights)  Once                   Orders Placed This Encounter   Procedures    Inpatient Admission (expected length of stay for this patient Order details is greater than two midnights)     Standing Status:   Standing     Number of Occurrences:   1     Order Specific Question:   Admitting Physician     Answer:   London Alves     Order Specific Question:   Level of Care     Answer:   Med Surg [16]     Order Specific Question:   Estimated length of stay     Answer:   More than 2 Midnights     Order Specific Question:   Certification     Answer:   I certify that inpatient services are medically necessary for this patient for a duration of greater than two midnights  See H&P and MD Progress Notes for additional information about the patient's course of treatment  ED Arrival Information     Expected Arrival Acuity Means of Arrival Escorted By Service Admission Type    1/2/2020 14:04 1/2/2020 14:57 Urgent Walk-In Spouse Hospitalist Urgent    Arrival Complaint    cellulitis of right lower ext        Chief Complaint   Patient presents with    Cellulitis     R foot swelling, cellulitis since last Friday  Antibiotics since Monday, Clindaymycin  Assessment/Plan: 39 yo male to ED from home admitted as Inpatient due to Cellulitis of right lower extremity requiring IV antibiotics  Patient saw PCP 4 days prior for fever, chills, severe body ache with shortness of breath with leg swelling & he was prescribed Clindamycin 300 mg QID  He noticed improvement with shortness of breath, fever & chills but noted his leg was worsening  He cont with PO course but despite taking it, it made the right leg swell significantly in 1 day  He complained of warmth but no pain   He sought ED sheba HOLLAND exam: Right lower extremity red, swollen  Imaging reveals cellulitis  In ED obtained blood cultures, IV antibiotics, IV hydration, repeat CBC  IV Zofran for nausea  IV Morphine for breakthrough pain     ED Triage Vitals [01/02/20 1526]   Temperature Pulse Respirations Blood Pressure SpO2   98 3 °F (36 8 °C) 70 18 152/90 92 %      Temp Source Heart Rate Source Patient Position - Orthostatic VS BP Location FiO2 (%)   Oral Monitor Sitting Right arm --      Pain Score       No Pain        Wt Readings from Last 1 Encounters:   01/03/20 (!) 141 kg (310 lb 13 6 oz)     Additional Vital Signs:   Date/Time  Temp  Pulse  Resp  BP  MAP (mmHg)  SpO2  O2 Device  Patient Position - Orthostatic VS   01/03/20 14:42:12  97 7 °F (36 5 °C)  67  18  148/87  107  99 %  --  --   01/03/20 07:01:52  97 6 °F (36 4 °C)  65  18  168/105Abnormal   126  97 %  --  --   01/03/20 00:47:16  97 9 °F (36 6 °C)  77  18  139/86  104  97 %  None (Room air)  Lying   01/02/20 1815  98 1 °F (36 7 °C)  --  --  --  --  --  --  --   01/02/20 1526  98 3 °F (36 8 °C)  70  18  152/90  --  92 %  None (Room air)  Sitting      Weights (last 14 days)     Date/Time  Weight  Weight Method   01/03/20 0600  141 kg (310 lb 13 6 oz)Abnormal   Standing scale       Pertinent Labs/Diagnostic Test Results:   Results from last 7 days   Lab Units 01/03/20  0443 01/02/20  1619   WBC Thousand/uL 10 23* 10 37*   HEMOGLOBIN g/dL 11 3* 12 6   HEMATOCRIT % 35 1* 39 3   PLATELETS Thousands/uL 246 267   NEUTROS ABS Thousands/µL 5 37  --    BANDS PCT %  --  1         Results from last 7 days   Lab Units 01/03/20  0442 01/02/20  1619   SODIUM mmol/L 139 140   POTASSIUM mmol/L 3 5 3 7   CHLORIDE mmol/L 104 102   CO2 mmol/L 29 29   ANION GAP mmol/L 6 9   BUN mg/dL 17 23   CREATININE mg/dL 1 12 1 28   EGFR ml/min/1 73sq m 79 68   CALCIUM mg/dL 8 9 9 0     Results from last 7 days   Lab Units 01/03/20  0442 01/02/20  1619   AST U/L 22 32   ALT U/L 41 51   ALK PHOS U/L 149* 181* TOTAL PROTEIN g/dL 6 7 7 4   ALBUMIN g/dL 2 5* 2 8*   TOTAL BILIRUBIN mg/dL 0 21 0 23         Results from last 7 days   Lab Units 01/03/20  0442 01/02/20  1619   GLUCOSE RANDOM mg/dL 100 109           Results from last 7 days   Lab Units 01/03/20  0442   PROCALCITONIN ng/ml 0 20       Results from last 7 days   Lab Units 01/02/20  1810 01/02/20  1619   BLOOD CULTURE  Received in Microbiology Lab  Culture in Progress  Received in Microbiology Lab  Culture in Progress  Results from last 7 days   Lab Units 01/02/20  1619   TOTAL COUNTED  100     1/2/2020 Xr Tibia Fibula 2 Views Right: Impression: Significant smooth periosteal reaction along the mid to distal fibular diaphysis suspicious for early acute osteoarthritis  No bony erosions  Diffuse soft tissue swelling  1/2/2020 Xr Ankle 3+ Views Right: Impression: Significant smooth periosteal reaction along the mid to distal fibular diaphysis suspicious for early acute osteoarthritis  No bony erosions  Diffuse soft tissue swelling      Past Medical History:   Diagnosis Date    Nausea & vomiting 1/2/2020     Present on Admission:   Hypothyroidism    Admitting Diagnosis: Cellulitis [L03 90]  Leukocytosis [D72 829]  Elevated alkaline phosphatase level [R74 8]  Cellulitis of right lower extremity [L03 115]  Age/Sex: 40 y o  male  Admission Orders:  Neurovascular checks Q4h  Daily wt  Vitals routine  Up OOB  Scheduled Medications:    Medications:  cefazolin 2,000 mg Intravenous Q8H   levothyroxine 225 mcg Oral Early Morning   oxyCODONE 40 mg Oral Q8H [facility]     Continuous IV Infusions:     PRN Meds:    acetaminophen 650 mg Oral Q6H PRN   magnesium hydroxide 30 mL Oral Daily PRN   morphine injection 2 mg Intravenous Q4H PRN   ondansetron 4 mg Intravenous Q6H PRN   oxyCODONE 15 mg Oral Q4H PRN     Network Utilization Review Department  [email or web address]  ATTENTION: Please call with any questions or concerns to [phone] and carefully listen to the prompts so that you are directed to the right person  All voicemails are confidential   Demi Jimmy all requests for admission clinical reviews, approved or denied determinations and any other requests to dedicated fax number below belonging to the campus where the patient is receiving treatment   List of dedicated fax numbers for the Facilities:  [address] DENIALS (Administrative/Medical Necessity) [phone]   [address] (Maternity/NICU/Pediatrics) [phone]   Shannon Baires [phone]   Dorothy Rogers [phone]   Shawna Guardado [phone]   Rowdy Luna [phone]   1205 [facility] [address] [place] [address] [phone]   [facility]  [phone]   [address] [facility]  24[address] [facility] [address] [facility] [phone]

## 2020-01-03 NOTE — ASSESSMENT & PLAN NOTE
· Presented with worsening right lower extremity swelling despite being on oral clindamycin for 4 days  Had a history of fever, chills, however resolved with oral antibiotics prior to admission  · Lower extremity CT:  Do of cellulitis, with no deep abscesses or evidence of osteomyelitis  · Lower extremity venous duplex:  No evidence of DVT  · Mild leukocytosis on admission (10 37), today 10 23, and cultures pending  · Currently being managed for right lower extremity cellulitis; 1 dose of IV vancomycin given yesterday    Plan  1  This continue IV vancomycin and start IV Ancef 2 g q 8h  2  Keep right leg elevated  3  Discontinue IV fluids  4  Follow-up blood cultures, procalcitonin  5  CBC, BMP a m

## 2020-01-03 NOTE — PLAN OF CARE
Problem: PAIN - ADULT  Goal: Verbalizes/displays adequate comfort level or baseline comfort level  Description  Interventions:  - Encourage patient to monitor pain and request assistance  - Assess pain using appropriate pain scale  - Administer analgesics based on type and severity of pain and evaluate response  - Implement non-pharmacological measures as appropriate and evaluate response  - Consider cultural and social influences on pain and pain management  - Notify physician/advanced practitioner if interventions unsuccessful or patient reports new pain  Outcome: Progressing     Problem: INFECTION - ADULT  Goal: Absence or prevention of progression during hospitalization  Description  INTERVENTIONS:  - Assess and monitor for signs and symptoms of infection  - Monitor lab/diagnostic results  - Monitor all insertion sites, i e  indwelling lines, tubes, and drains  - Monitor endotracheal if appropriate and nasal secretions for changes in amount and color  - [place] appropriate cooling/warming therapies per order  - Administer medications as ordered  - Instruct and encourage patient and family to use good hand hygiene technique  - Identify and instruct in appropriate isolation precautions for identified infection/condition  Outcome: Progressing  Goal: Absence of fever/infection during neutropenic period  Description  INTERVENTIONS:  - Monitor WBC    Outcome: Progressing     Problem: SAFETY ADULT  Goal: Patient will remain free of falls  Description  INTERVENTIONS:  - Assess patient frequently for physical needs  -  Identify cognitive and physical deficits and behaviors that affect risk of falls    -  [place] fall precautions as indicated by assessment   - Educate patient/family on patient safety including physical limitations  - Instruct patient to call for assistance with activity based on assessment  - Modify environment to reduce risk of injury  - Consider OT/PT consult to assist with strengthening/mobility  Outcome: Progressing  Goal: Maintain or return to baseline ADL function  Description  INTERVENTIONS:  -  Assess patient's ability to carry out ADLs; assess patient's baseline for ADL function and identify physical deficits which impact ability to perform ADLs (bathing, care of mouth/teeth, toileting, grooming, dressing, etc )  - Assess/evaluate cause of self-care deficits   - Assess range of motion  - Assess patient's mobility; develop plan if impaired  - Assess patient's need for assistive devices and provide as appropriate  - Encourage maximum independence but intervene and supervise when necessary  - Involve family in performance of ADLs  - Assess for home care needs following discharge   - Consider OT consult to assist with ADL evaluation and planning for discharge  - Provide patient education as appropriate  Outcome: Progressing  Goal: Maintain or return mobility status to optimal level  Description  INTERVENTIONS:  - Assess patient's baseline mobility status (ambulation, transfers, stairs, etc )    - Identify cognitive and physical deficits and behaviors that affect mobility  - Identify mobility aids required to assist with transfers and/or ambulation (gait belt, sit-to-stand, lift, walker, cane, etc )  - [place] fall precautions as indicated by assessment  - Record patient progress and toleration of activity level on Mobility SBAR; progress patient to next Phase/Stage  - Instruct patient to call for assistance with activity based on assessment  - Consider rehabilitation consult to assist with strengthening/weightbearing, etc   Outcome: Progressing     Problem: DISCHARGE PLANNING  Goal: Discharge to home or other facility with appropriate resources  Description  INTERVENTIONS:  - Identify barriers to discharge w/patient and caregiver  - Arrange for needed discharge resources and transportation as appropriate  - Identify discharge learning needs (meds, wound care, etc )  - Arrange for interpretive services to assist at discharge as needed  - Refer to Case Management Department for coordinating discharge planning if the patient needs post-hospital services based on physician/advanced practitioner order or complex needs related to functional status, cognitive ability, or social support system  Outcome: Progressing     Problem: Knowledge Deficit  Goal: Patient/family/caregiver demonstrates understanding of disease process, treatment plan, medications, and discharge instructions  Description  Complete learning assessment and assess knowledge base    Interventions:  - Provide teaching at level of understanding  - Provide teaching via preferred learning methods  Outcome: Progressing

## 2020-01-03 NOTE — ASSESSMENT & PLAN NOTE
· Patient was complaining of nausea vomiting and loss of appetite, now resolved    Plan  1  Zofran 4 mg IV p r n

## 2020-01-03 NOTE — PLAN OF CARE
Problem: PAIN - ADULT  Goal: Verbalizes/displays adequate comfort level or baseline comfort level  Description  Interventions:  - Encourage patient to monitor pain and request assistance  - Assess pain using appropriate pain scale  - Administer analgesics based on type and severity of pain and evaluate response  - Implement non-pharmacological measures as appropriate and evaluate response  - Consider cultural and social influences on pain and pain management  - Notify physician/advanced practitioner if interventions unsuccessful or patient reports new pain  Outcome: Progressing     Problem: INFECTION - ADULT  Goal: Absence or prevention of progression during hospitalization  Description  INTERVENTIONS:  - Assess and monitor for signs and symptoms of infection  - Monitor lab/diagnostic results  - Monitor all insertion sites, i e  indwelling lines, tubes, and drains  - Monitor endotracheal if appropriate and nasal secretions for changes in amount and color  - [place] appropriate cooling/warming therapies per order  - Administer medications as ordered  - Instruct and encourage patient and family to use good hand hygiene technique  - Identify and instruct in appropriate isolation precautions for identified infection/condition  Outcome: Progressing  Goal: Absence of fever/infection during neutropenic period  Description  INTERVENTIONS:  - Monitor WBC    Outcome: Progressing     Problem: SAFETY ADULT  Goal: Patient will remain free of falls  Description  INTERVENTIONS:  - Assess patient frequently for physical needs  -  Identify cognitive and physical deficits and behaviors that affect risk of falls    -  [place] fall precautions as indicated by assessment   - Educate patient/family on patient safety including physical limitations  - Instruct patient to call for assistance with activity based on assessment  - Modify environment to reduce risk of injury  - Consider OT/PT consult to assist with strengthening/mobility  Outcome: Progressing  Goal: Maintain or return to baseline ADL function  Description  INTERVENTIONS:  -  Assess patient's ability to carry out ADLs; assess patient's baseline for ADL function and identify physical deficits which impact ability to perform ADLs (bathing, care of mouth/teeth, toileting, grooming, dressing, etc )  - Assess/evaluate cause of self-care deficits   - Assess range of motion  - Assess patient's mobility; develop plan if impaired  - Assess patient's need for assistive devices and provide as appropriate  - Encourage maximum independence but intervene and supervise when necessary  - Involve family in performance of ADLs  - Assess for home care needs following discharge   - Consider OT consult to assist with ADL evaluation and planning for discharge  - Provide patient education as appropriate  Outcome: Progressing  Goal: Maintain or return mobility status to optimal level  Description  INTERVENTIONS:  - Assess patient's baseline mobility status (ambulation, transfers, stairs, etc )    - Identify cognitive and physical deficits and behaviors that affect mobility  - Identify mobility aids required to assist with transfers and/or ambulation (gait belt, sit-to-stand, lift, walker, cane, etc )  - [place] fall precautions as indicated by assessment  - Record patient progress and toleration of activity level on Mobility SBAR; progress patient to next Phase/Stage  - Instruct patient to call for assistance with activity based on assessment  - Consider rehabilitation consult to assist with strengthening/weightbearing, etc   Outcome: Progressing     Problem: DISCHARGE PLANNING  Goal: Discharge to home or other facility with appropriate resources  Description  INTERVENTIONS:  - Identify barriers to discharge w/patient and caregiver  - Arrange for needed discharge resources and transportation as appropriate  - Identify discharge learning needs (meds, wound care, etc )  - Arrange for interpretive services to assist at discharge as needed  - Refer to Case Management Department for coordinating discharge planning if the patient needs post-hospital services based on physician/advanced practitioner order or complex needs related to functional status, cognitive ability, or social support system  Outcome: Progressing     Problem: Knowledge Deficit  Goal: Patient/family/caregiver demonstrates understanding of disease process, treatment plan, medications, and discharge instructions  Description  Complete learning assessment and assess knowledge base    Interventions:  - Provide teaching at level of understanding  - Provide teaching via preferred learning methods  Outcome: Progressing

## 2020-01-03 NOTE — PROGRESS NOTES
Progress Note - Belia Stack 1975, 40 y o  male MRN: [id number]    Unit/Bed#: W -01 Encounter: [id number]    Primary Care Provider: Kenny Oliver MD   Date and time admitted to hospital: 1/2/2020  3:52 PM        * Cellulitis of right lower extremity  Assessment & Plan  · Presented with worsening right lower extremity swelling despite being on oral clindamycin for 4 days  Had a history of fever, chills, however resolved with oral antibiotics prior to admission  · Lower extremity CT:  Do of cellulitis, with no deep abscesses or evidence of osteomyelitis  · Lower extremity venous duplex:  No evidence of DVT  · Mild leukocytosis on admission (10 37), today 10 23, and cultures pending  · Currently being managed for right lower extremity cellulitis; 1 dose of IV vancomycin given yesterday    Plan  1  This continue IV vancomycin and start IV Ancef 2 g q 8h  2  Keep right leg elevated  3  Discontinue IV fluids  4  Follow-up blood cultures, procalcitonin  5  CBC, BMP a m  Chronic back pain  Assessment & Plan  · Patient has a chronic back pain which he takes opioids  PDMP was reviewed patient usually takes OxyContin and oxycodone    Plan  6  Continue home medication OxyContin 15 mg every 4 hours p r n   7  Continue home medication oxycodone 40 mg every 8 hours    Hypothyroidism  Assessment & Plan  · Patient has a history of hypothyroidism    Plan  8  Levothyroxine to 225 mcg per day    Nausea & vomiting-resolved as of 1/3/2020  Assessment & Plan  · Patient was complaining of nausea vomiting and loss of appetite, now resolved    Plan  9  Zofran 4 mg IV p r n          VTE Pharmacologic Prophylaxis:   Pharmacologic: low VTE risk  Mechanical VTE Prophylaxis in Place: No    Discussions with Specialists or Other Care Team Provider:  Case management    Education and Discussions with Family / Patient:  Patient and his wife    Current Length of Stay: 1 day(s)    Current Patient Status: Inpatient     Discharge Plan / Estimated Discharge Date:  Likely in 48 hours    Code Status: Level 1 - Full Code      Subjective:   Patient states that he has noticed a slight improvement in his swelling, however not significant enough  He denies pain, fever, or chills  He keeps his right foot elevated, however there are times when he is unable to stay in bed all day  Has a good appetite, nausea and vomiting has resolved now  Objective:     Vitals:   Temp (24hrs), Av °F (36 7 °C), Min:97 6 °F (36 4 °C), Max:98 3 °F (36 8 °C)    Temp:  [97 6 °F (36 4 °C)-98 3 °F (36 8 °C)] 97 6 °F (36 4 °C)  HR:  [65-77] 65  Resp:  [18] 18  BP: (139-168)/() 168/105  SpO2:  [92 %-97 %] 97 %  Body mass index is 38 85 kg/m²  Input and Output Summary (last 24 hours): Intake/Output Summary (Last 24 hours) at 1/3/2020 0920  Last data filed at 2020 1838  Gross per 24 hour   Intake 900 ml   Output --   Net 900 ml       Physical Exam:     Physical Exam   Constitutional: He is oriented to person, place, and time  He appears well-developed and well-nourished  No distress  HENT:   Head: Normocephalic and atraumatic  Mouth/Throat: Oropharynx is clear and moist    Eyes: Pupils are equal, round, and reactive to light  EOM are normal    Neck: Normal range of motion  Neck supple  Cardiovascular: Normal rate, regular rhythm, normal heart sounds and intact distal pulses  No murmur heard  Pulmonary/Chest: Effort normal and breath sounds normal  He has no wheezes  Abdominal: Soft  Bowel sounds are normal  There is no tenderness  Musculoskeletal: Normal range of motion  He exhibits edema (right foot, and leg upto mid calf) and tenderness  Neurological: He is alert and oriented to person, place, and time  Skin: Skin is warm  Capillary refill takes less than 2 seconds  He is not diaphoretic  There is erythema  There is swelling, warmth, and redness (blanching) of the right foot, ankle, and leg to mid calf level    He exhibits tenderness to palpation  No ulcers or blistering noted  Psychiatric: He has a normal mood and affect  His behavior is normal  Judgment and thought content normal    Nursing note and vitals reviewed  Additional Data:     Labs:    Results from last 7 days   Lab Units 01/03/20  0443   WBC Thousand/uL 10 23*   HEMOGLOBIN g/dL 11 3*   HEMATOCRIT % 35 1*   PLATELETS Thousands/uL 246   NEUTROS PCT % 51   LYMPHS PCT % 31   MONOS PCT % 9   EOS PCT % 2     Results from last 7 days   Lab Units 01/03/20  0442   POTASSIUM mmol/L 3 5   CHLORIDE mmol/L 104   CO2 mmol/L 29   BUN mg/dL 17   CREATININE mg/dL 1 12   CALCIUM mg/dL 8 9   ALK PHOS U/L 149*   ALT U/L 41   AST U/L 22           * I Have Reviewed All Lab Data Listed Above  * Additional Pertinent Lab Tests Reviewed: [facility]    Imaging:    Imaging Reports Reviewed Today Include:  Lower extremity CT, venous duplex  Imaging Personally Reviewed by Myself Includes:  None    Recent Cultures (last 7 days):     Results from last 7 days   Lab Units 01/02/20  1810 01/02/20  1619   BLOOD CULTURE  Received in Microbiology Lab  Culture in Progress  Received in Microbiology Lab  Culture in Progress  Last 24 Hours Medication List:     Current Facility-Administered Medications:  acetaminophen 650 mg Oral Q6H PRN Gibran Bagley MD   cefazolin 2,000 mg Intravenous Q8H Heidi Nina MD   levothyroxine 225 mcg Oral Early Morning Gibran Bagley MD   magnesium hydroxide 30 mL Oral Daily PRN Gibran Bagley MD   morphine injection 2 mg Intravenous Q4H PRN Guerline Olivia MD   ondansetron 4 mg Intravenous Q6H PRN Gibran Bagley MD   oxyCODONE 40 mg Oral [facility] Gibran Bagley MD   oxyCODONE 15 mg Oral Q4H PRN Gibran Bagley MD        Today, Patient Was Seen By: Heidi Nina MD    ** Please Note: This note has been constructed using a voice recognition system   **

## 2020-01-03 NOTE — PROGRESS NOTES
Niranjan Jimenez is here for follow-up of his cellulitis  He was seen here on 12/30  He was put on clindamycin  He was advised to call with progress after 24 hours which he never did  The pain is now worse and he is unable to bear weight  The leg is redder  There has been no fever chills  He is taking clindamycin for 3 days  O: /86 (Cuff Size: Large)   Pulse 76   Temp 98 3 °F (36 8 °C)   Right lower extremity is a deep red from mid calf to the toes    He has got 2+ pitting edema in the anterior right leg     Assessment  Cellulitis right lower extremity-failing oral antibiotics    Plan  Patient was referred to [facility]

## 2020-01-03 NOTE — ASSESSMENT & PLAN NOTE
· Patient has a chronic back pain which he takes opioids  PDMP was reviewed patient usually takes OxyContin and oxycodone    Plan  1   Continue home medication OxyContin 15 mg every 4 hours p r n   2  Continue home medication oxycodone 40 mg every 8 hours

## 2020-01-04 VITALS
OXYGEN SATURATION: 97 % | SYSTOLIC BLOOD PRESSURE: 147 MMHG | BODY MASS INDEX: 38.85 KG/M2 | TEMPERATURE: 97.6 F | HEART RATE: 68 BPM | DIASTOLIC BLOOD PRESSURE: 92 MMHG | WEIGHT: 310.85 LBS | RESPIRATION RATE: 18 BRPM

## 2020-01-04 PROBLEM — I10 HYPERTENSION: Status: ACTIVE | Noted: 2020-01-04

## 2020-01-04 LAB
ANION GAP SERPL CALCULATED.3IONS-SCNC: 8 MMOL/L (ref 4–13)
BASOPHILS # BLD MANUAL: 0 THOUSAND/UL (ref 0–0.1)
BASOPHILS NFR MAR MANUAL: 0 % (ref 0–1)
BUN SERPL-MCNC: 17 MG/DL (ref 5–25)
CALCIUM SERPL-MCNC: 9 MG/DL (ref 8.3–10.1)
CHLORIDE SERPL-SCNC: 104 MMOL/L (ref 100–108)
CO2 SERPL-SCNC: 28 MMOL/L (ref 21–32)
CREAT SERPL-MCNC: 1.08 MG/DL (ref 0.6–1.3)
EOSINOPHIL # BLD MANUAL: 0.3 THOUSAND/UL (ref 0–0.4)
EOSINOPHIL NFR BLD MANUAL: 3 % (ref 0–6)
ERYTHROCYTE [DISTWIDTH] IN BLOOD BY AUTOMATED COUNT: 13.4 % (ref 11.6–15.1)
GFR SERPL CREATININE-BSD FRML MDRD: 83 ML/MIN/1.73SQ M
GLUCOSE SERPL-MCNC: 156 MG/DL (ref 65–140)
HCT VFR BLD AUTO: 36.1 % (ref 36.5–49.3)
HGB BLD-MCNC: 11.5 G/DL (ref 12–17)
LYMPHOCYTES # BLD AUTO: 2.32 THOUSAND/UL (ref 0.6–4.47)
LYMPHOCYTES # BLD AUTO: 23 % (ref 14–44)
MCH RBC QN AUTO: 27.5 PG (ref 26.8–34.3)
MCHC RBC AUTO-ENTMCNC: 31.9 G/DL (ref 31.4–37.4)
MCV RBC AUTO: 86 FL (ref 82–98)
METAMYELOCYTES NFR BLD MANUAL: 6 % (ref 0–1)
MONOCYTES # BLD AUTO: 1.41 THOUSAND/UL (ref 0–1.22)
MONOCYTES NFR BLD: 14 % (ref 4–12)
MYELOCYTES NFR BLD MANUAL: 3 % (ref 0–1)
NEUTROPHILS # BLD MANUAL: 5.05 THOUSAND/UL (ref 1.85–7.62)
NEUTS BAND NFR BLD MANUAL: 1 % (ref 0–8)
NEUTS SEG NFR BLD AUTO: 49 % (ref 43–75)
NRBC BLD AUTO-RTO: 0 /100 WBCS
PLATELET # BLD AUTO: 271 THOUSANDS/UL (ref 149–390)
PLATELET BLD QL SMEAR: ADEQUATE
PMV BLD AUTO: 9.6 FL (ref 8.9–12.7)
POTASSIUM SERPL-SCNC: 3.5 MMOL/L (ref 3.5–5.3)
RBC # BLD AUTO: 4.18 MILLION/UL (ref 3.88–5.62)
RBC MORPH BLD: NORMAL
SODIUM SERPL-SCNC: 140 MMOL/L (ref 136–145)
TOTAL CELLS COUNTED SPEC: 100
VARIANT LYMPHS # BLD AUTO: 1 %
WBC # BLD AUTO: 10.1 THOUSAND/UL (ref 4.31–10.16)

## 2020-01-04 PROCEDURE — 99239 HOSP IP/OBS DSCHRG MGMT >30: CPT | Performed by: HOSPITALIST

## 2020-01-04 PROCEDURE — 80048 BASIC METABOLIC PNL TOTAL CA: CPT | Performed by: INTERNAL MEDICINE

## 2020-01-04 PROCEDURE — 85007 BL SMEAR W/DIFF WBC COUNT: CPT | Performed by: INTERNAL MEDICINE

## 2020-01-04 PROCEDURE — 85027 COMPLETE CBC AUTOMATED: CPT | Performed by: INTERNAL MEDICINE

## 2020-01-04 RX ORDER — POLYETHYLENE GLYCOL 3350 17 G/17G
17 POWDER, FOR SOLUTION ORAL DAILY PRN
Status: DISCONTINUED | OUTPATIENT
Start: 2020-01-04 | End: 2020-01-04 | Stop reason: HOSPADM

## 2020-01-04 RX ORDER — CEPHALEXIN 500 MG/1
500 CAPSULE ORAL EVERY 6 HOURS SCHEDULED
Qty: 32 CAPSULE | Refills: 0 | Status: SHIPPED | OUTPATIENT
Start: 2020-01-04 | End: 2020-01-12

## 2020-01-04 RX ADMIN — CEFAZOLIN SODIUM 2000 MG: 2 SOLUTION INTRAVENOUS at 08:33

## 2020-01-04 RX ADMIN — OXYCODONE HYDROCHLORIDE 40 MG: 40 TABLET, FILM COATED, EXTENDED RELEASE ORAL at 05:30

## 2020-01-04 RX ADMIN — OXYCODONE HYDROCHLORIDE 15 MG: 10 TABLET ORAL at 00:26

## 2020-01-04 RX ADMIN — POLYETHYLENE GLYCOL 3350 17 G: 17 POWDER, FOR SOLUTION ORAL at 08:55

## 2020-01-04 RX ADMIN — CEFAZOLIN SODIUM 2000 MG: 2 SOLUTION INTRAVENOUS at 00:26

## 2020-01-04 RX ADMIN — OXYCODONE HYDROCHLORIDE 15 MG: 10 TABLET ORAL at 08:54

## 2020-01-04 RX ADMIN — LEVOTHYROXINE SODIUM 225 MCG: 175 TABLET ORAL at 05:30

## 2020-01-04 NOTE — ASSESSMENT & PLAN NOTE
· Patient's blood pressure has been running between -148 during the hospital course  · No prior history of hypertension, and is not on antihypertensives  · Advised patient to follow up with his primary care physician for outpatient monitoring of his blood pressure as this elevation may likely be due to his acute infection/anxiety    Plan  1   Follow-up with primary care physician within 1 week

## 2020-01-04 NOTE — ASSESSMENT & PLAN NOTE
· Presented with worsening right lower extremity swelling despite being on oral clindamycin for 4 days  Had a history of fever, chills, however resolved with oral antibiotics prior to admission  · Lower extremity CT:  Suggestive of cellulitis, with no deep abscesses or evidence of osteomyelitis  · Lower extremity venous duplex:  No evidence of DVT  · Mild leukocytosis on admission (10 37), and down to 10 1 today, and cultures no growth at 24 hours  · Managed for right lower extremity cellulitis; 1 dose of IV vancomycin given on day 1, switched to IV Ancef 2 g Q 8 H  · Patient's swelling and erythema continues to improve    Plan  1  Transition to oral Keflex 500 mg q 6h for 8 more days (total of 10 days)  2  Keep right leg elevated  3   Follow-up with primary care physician in 1 week

## 2020-01-04 NOTE — DISCHARGE SUMMARY
Discharge- Bel Corado 1975, 40 y o  male MRN: [id number]    Unit/Bed#: W -01 Encounter: [id number]    Primary Care Provider: Kennedy Shell MD   Date and time admitted to hospital: 1/2/2020  3:52 PM        * Cellulitis of right lower extremity  Assessment & Plan  · Presented with worsening right lower extremity swelling despite being on oral clindamycin for 4 days  Had a history of fever, chills, however resolved with oral antibiotics prior to admission  · Lower extremity CT:  Suggestive of cellulitis, with no deep abscesses or evidence of osteomyelitis  · Lower extremity venous duplex:  No evidence of DVT  · Mild leukocytosis on admission (10 37), and down to 10 1 today, and cultures no growth at 24 hours  · Managed for right lower extremity cellulitis; 1 dose of IV vancomycin given on day 1, switched to IV Ancef 2 g Q 8 H  · Patient's swelling and erythema continues to improve    Plan  1  Transition to oral Keflex 500 mg q 6h for 8 more days (total of 10 days)  2  Keep right leg elevated  3  Follow-up with primary care physician in 1 week    Hypertension  Assessment & Plan  · Patient's blood pressure has been running between -148 during the hospital course  · No prior history of hypertension, and is not on antihypertensives  · Advised patient to follow up with his primary care physician for outpatient monitoring of his blood pressure as this elevation may likely be due to his acute infection/anxiety    Plan  7  Follow-up with primary care physician within 1 week    Chronic back pain  Assessment & Plan  · Patient has a chronic back pain which he takes opioids  PDMP was reviewed patient usually takes OxyContin and oxycodone    Plan  4  Continue home medication OxyContin 15 mg every 4 hours p r n   5  Continue home medication oxycodone 40 mg every 8 hours    Hypothyroidism  Assessment & Plan  · Patient has a history of hypothyroidism    Plan  6   Levothyroxine to 225 mcg per day      Discharging Resident Physician: Lj Cuevas MD  Attending: Pauletta Hatchet, MD  PCP: Julienne Magdaleno MD  Admission Date: 1/2/2020  Discharge Date: 01/04/20    Disposition:     Home    Reason for Admission:  Right lower extremity cellulitis    Consultations During Hospital Stay:  · None    Procedures Performed:     · None    Significant Findings / Test Results:     · CBC:  WBC 10 37 on admission trended down to 10 1 on discharge, hemoglobin 11 5, platelets 214  · BMP:  Sodium 140, potassium 3 5, BUN 17, creatinine 1 08  · Blood cultures no growth at 12:00 p m  Hours  · Procalcitonin 0 2  · CT lower extremities:  Evidence of cellulitis, no deep abscess or osteomyelitis  · Lower extremity venous duplex:  No evidence of DVT    Incidental Findings:   · None    Test Results Pending at Discharge (will require follow up): · Follow-up blood cultures     Outpatient Tests Requested:  · None    Complications:  None    Hospital Course:     Danis Alexander is a 40 y o  male patient who originally presented to the hospital on 1/2/2020 due to worsening right lower extremity swelling, fever, and chills  He was started on clindamycin 300 mg q i d  On Monday, without improvement in his swelling  He denies pain  On admission to the ED, there was mild leukocytosis  CT lower extremities showed evidence of cellulitis without deep abscesses or osteomyelitis  Venous duplex showed no evidence of DVT  He was started on IV vancomycin for 1 day, and transition to IV Ancef on 01/03/2020  His swelling, and redness continued to improve during his hospital stay, and on day 2, he was transitioned to oral Keflex 500 mg q i d  For 8 more days on discharge  Was also noted that he had his BP readings ranging between 139-148 during his hospital stay  He denied headache, visual disturbances  He stated that this is unusual for him, and has never been diagnosed of hypertension    As there is no previous diagnosis, and blood pressure was not alarmingly high, we continued to monitor, and on discharge advised the patient to follow up with his primary care physician for monitoring  Condition at Discharge: stable     Discharge Day Visit / Exam:     Subjective:  Patient states that he sees any improvement in his swelling and redness of the leg  He denies pain, fever, chills  Does not have headache, nausea, vomiting  He has a good appetite  Vitals: Blood Pressure: 147/92 (01/04/20 0826)  Pulse: 68 (01/04/20 0826)  Temperature: 97 6 °F (36 4 °C) (01/04/20 0826)  Temp Source: Oral (01/03/20 0047)  Respirations: 18 (01/04/20 0826)  Weight - Scale: (!) 141 kg (310 lb 13 6 oz) (01/03/20 0600)  SpO2: 97 % (01/04/20 0826)     Exam:   Physical Exam   Constitutional: He is oriented to person, place, and time  He appears well-developed and well-nourished  No distress  HENT:   Head: Normocephalic and atraumatic  Mouth/Throat: Oropharynx is clear and moist    Eyes: Pupils are equal, round, and reactive to light  EOM are normal    Neck: Normal range of motion  Neck supple  No JVD present  Cardiovascular: Normal rate, regular rhythm, normal heart sounds and intact distal pulses  No murmur heard  Pulmonary/Chest: Effort normal and breath sounds normal  He has no wheezes  He has no rales  Abdominal: Soft  Bowel sounds are normal  There is no tenderness  Musculoskeletal: Normal range of motion  He exhibits edema (Right lower extremity, improved from yesterday) and tenderness  Neurological: He is alert and oriented to person, place, and time  Skin: Skin is warm  Capillary refill takes less than 2 seconds  He is not diaphoretic  There is erythema ( noted in the right lower extremity, improved from yesterday  )  Psychiatric: He has a normal mood and affect  His behavior is normal  Judgment and thought content normal    Nursing note and vitals reviewed      Discussion with Family:  Patient and his wife    Discharge instructions/Information to patient and family:   See after visit summary for information provided to patient and family  Provisions for Follow-Up Care:  See after visit summary for information related to follow-up care and any pertinent home health orders  Planned Readmission:  None     Discharge Medications:  See after visit summary for reconciled discharge medications provided to patient and family        ** Please Note: This note has been constructed using a voice recognition system **

## 2020-01-04 NOTE — DISCHARGE INSTRUCTIONS
Dear Lore Michelle,     It was our pleasure to care for you here at [facility]   It is our hope that we were always able to meet and exceed the expected standards for your care during your stay  You were hospitalized due to right lower extremity cellulitis   You were cared for on the 4th floor in the [facility] under the service of Sienna Moncada MD (resident) and Hammad De Los Santos MD (attending hospitalist) with the [facility] who covers for your primary care physician (PCP), Elen Soto MD, while you were hospitalized  If you have any questions or concerns related to this hospitalization, you may contact us at [phone]  For follow up, we recommend that you follow up with your primary care physician  Please review this entire discharge summary as additional general instructions may be provided later as well  However, at this time we provide for you here, the most important instructions / recommendations at discharge:     · Take your antibiotic, oral Keflex 500 mg every 6 hours for 8 more days starting this evening  · Continue to take your home medications as he did prior to admission  · Follow-up with your primary care physician within 1 week ; We have provided you with a work note stating that you may return to work once cleared by your primary care physician  · Keep your right leg elevated      Sincerely,     Sienna Moncada MD     Cellulitis   WHAT YOU NEED TO KNOW:   Cellulitis is a skin infection caused by bacteria  Cellulitis may go away on its own or you may need treatment  Your healthcare provider may draw a [place] around the outside edges of your cellulitis  If your cellulitis spreads, your healthcare provider will see it outside of the [place]  DISCHARGE INSTRUCTIONS:   Call 911 if:   · You have sudden trouble breathing or chest pain  Return to the emergency department if:   · Your wound gets larger and more painful       · You feel a crackling under your skin when you touch it  · You have purple dots or bumps on your skin, or you see bleeding under your skin  · You have new swelling and pain in your legs  · The red, warm, swollen area gets larger  · You see red streaks coming from the infected area  Contact your healthcare provider if:   · You have a fever  · Your fever or pain does not go away or gets worse  · The area does not get smaller after 2 days of antibiotics  · Your skin is flaking or peeling off  · You have questions or concerns about your condition or care  Medicines:   · Antibiotics  help treat the bacterial infection  · NSAIDs , such as ibuprofen, help decrease swelling, pain, and fever  NSAIDs can cause stomach bleeding or kidney problems in certain people  If you take blood thinner medicine, always ask if NSAIDs are safe for you  Always read the medicine label and follow directions  Do not give these medicines to children under 10months of age without direction from your child's healthcare provider  · Acetaminophen  decreases pain and fever  It is available without a doctor's order  Ask how much to take and how often to take it  Follow directions  Read the labels of all other medicines you are using to see if they also contain acetaminophen, or ask your doctor or pharmacist  Acetaminophen can cause liver damage if not taken correctly  Do not use more than 4 grams (4,000 milligrams) total of acetaminophen in one day  · Take your medicine as directed  Contact your healthcare provider if you think your medicine is not helping or if you have side effects  Tell him or her if you are allergic to any medicine  Keep a list of the medicines, vitamins, and herbs you take  Include the amounts, and when and why you take them  Bring the list or the pill bottles to follow-up visits  Carry your medicine list with you in case of an emergency    Self-care:   · Elevate the area above the level of your heart as often as you can  This will help decrease swelling and pain  Prop the area on pillows or blankets to keep it elevated comfortably  · Clean the area daily until the wound scabs over  Gently wash the area with soap and water  Pat dry  Use dressings as directed  · Place cool or warm, wet cloths on the area as directed  Use clean cloths and clean water  Leave it on the area until the cloth is room temperature  Pat the area dry with a clean, dry cloth  The cloths may help decrease pain  Prevent cellulitis:   · Do not scratch bug bites or areas of injury  You increase your risk for cellulitis by scratching these areas  · Do not share personal items, such as towels, clothing, and razors  · Clean exercise equipment  with germ-killing  before and after you use it  · Wash your hands often  Use soap and water  Wash your hands after you use the bathroom, change a child's diapers, or sneeze  Wash your hands before you prepare or eat food  Use lotion to prevent dry, cracked skin  · Wear pressure stockings as directed  You may be told to wear the stockings if you have peripheral edema  The stockings improve blood flow and decrease swelling  · Treat athlete's foot  This can help prevent the spread of a bacterial skin infection  Follow up with your healthcare provider within 3 days, or as directed: Your healthcare provider will check if your cellulitis is getting better  You may need different medicine  Write down your questions so you remember to ask them during your visits  © 2017 [employer] Information is for End User's use only and may not be sold, redistributed or otherwise used for commercial purposes  All illustrations and images included in CareNotes® are the copyrighted property of [employer]  or [employer]  The above information is an  only  It is not intended as medical advice for individual conditions or treatments   Talk to your doctor, nurse or pharmacist before following any medical regimen to see if it is safe and effective for you

## 2020-01-06 ENCOUNTER — TRANSITIONAL CARE MANAGEMENT (OUTPATIENT)
Dept: FAMILY MEDICINE CLINIC | Facility: MEDICAL CENTER | Age: 45
End: 2020-01-06

## 2020-01-06 NOTE — UTILIZATION REVIEW
Notification of Discharge  This is a Notification of Discharge from our facility [address]  Please be advised that this patient has been discharge from our facility  Below you will find the admission and discharge date and time including the patients disposition  PRESENTATION DATE: 1/2/2020  3:52 PM  OBS ADMISSION DATE:   IP ADMISSION DATE: 1/2/20 1649   DISCHARGE DATE: 1/4/2020  1:29 PM  DISPOSITION: Home/Self Care Home/Self Care   Admission Orders listed below:  Admission Orders (From admission, onward)     Ordered        01/02/20 1649  Inpatient Admission (expected length of stay for this patient Order details is greater than two midnights)  Once                   Please contact the UR Department if additional information is required to close this patient's authorization/case  [address] [facility] Utilization Review Department  Main: [phone] x carefully listen to the prompts  All voicemails are confidential   [email or web address]  Send all requests for admission clinical reviews, approved or denied determinations and any other requests to dedicated fax number below belonging to the campus where the patient is receiving treatment   List of dedicated fax numbers:  [address] DENIALS (Administrative/Medical Necessity) [phone]   [address] [place] [address] (Maternity/NICU/Pediatrics) [phone]   [facility] [phone]   Jaylon Hutton [phone]   Hammad Andino [phone]   Johan Kely [facility] [address] [facility] [phone]   [facility]  [phone]   [address] [facility] [address]  [address] [facility] [address] [facility] [phone]

## 2020-01-08 LAB
BACTERIA BLD CULT: ABNORMAL
BACTERIA BLD CULT: NORMAL
GRAM STN SPEC: ABNORMAL

## 2020-01-13 ENCOUNTER — TELEPHONE (OUTPATIENT)
Dept: FAMILY MEDICINE CLINIC | Facility: MEDICAL CENTER | Age: 45
End: 2020-01-13

## 2020-01-13 NOTE — TELEPHONE ENCOUNTER
Pt's wife called to request to speak with nurse  Pt is still having problems with his leg  It is still swollen and he can't bend it correctly  She also mentioned that he has an appt with Dr Jake Mckenna on Wednesday and that he has finished the antibiotics

## 2020-01-13 NOTE — TELEPHONE ENCOUNTER
Has an appt Wednesday  Should they be concerned? Or just wait til Wednesday    There is no fever, no redness, no drainage etc   He is keeping it elevated

## 2020-01-15 ENCOUNTER — OFFICE VISIT (OUTPATIENT)
Dept: FAMILY MEDICINE CLINIC | Facility: MEDICAL CENTER | Age: 45
End: 2020-01-15
Payer: COMMERCIAL

## 2020-01-15 ENCOUNTER — TELEPHONE (OUTPATIENT)
Dept: FAMILY MEDICINE CLINIC | Facility: MEDICAL CENTER | Age: 45
End: 2020-01-15

## 2020-01-15 VITALS
DIASTOLIC BLOOD PRESSURE: 80 MMHG | RESPIRATION RATE: 16 BRPM | BODY MASS INDEX: 39.17 KG/M2 | HEIGHT: 75 IN | WEIGHT: 315 LBS | SYSTOLIC BLOOD PRESSURE: 144 MMHG | HEART RATE: 100 BPM | TEMPERATURE: 97.2 F

## 2020-01-15 DIAGNOSIS — R60.0 LOCALIZED EDEMA: Primary | ICD-10-CM

## 2020-01-15 DIAGNOSIS — L03.115 CELLULITIS OF RIGHT LOWER EXTREMITY: ICD-10-CM

## 2020-01-15 PROCEDURE — 1111F DSCHRG MED/CURRENT MED MERGE: CPT | Performed by: FAMILY MEDICINE

## 2020-01-15 PROCEDURE — 99495 TRANSJ CARE MGMT MOD F2F 14D: CPT | Performed by: FAMILY MEDICINE

## 2020-01-15 RX ORDER — OXYCODONE 36 MG/1
CAPSULE, EXTENDED RELEASE ORAL
COMMUNITY
Start: 2020-01-09

## 2020-01-15 RX ORDER — FUROSEMIDE 20 MG/1
20 TABLET ORAL DAILY
Qty: 30 TABLET | Refills: 0 | Status: SHIPPED | OUTPATIENT
Start: 2020-01-15 | End: 2020-02-07

## 2020-01-15 RX ORDER — CEPHALEXIN 500 MG/1
500 CAPSULE ORAL 4 TIMES DAILY
COMMUNITY
End: 2020-01-29 | Stop reason: ALTCHOICE

## 2020-01-15 NOTE — TELEPHONE ENCOUNTER
I spoke to Haris Salcido  On the paper work the last page says to be completed by the employee  He believes he did that portion online  He asked me to fax everything except that page to the number on the form  I did fax it and then forwarded the paperwork to clerical to file on the chart  I did have them scan in the last page too even though he said he did not need it   Just in case

## 2020-01-15 NOTE — PROGRESS NOTES
Dennise Daley is here for SOLITARIO  Hospitalized for cellulitis of his RLE  He was hospitalized at [facility] from January 2nd to January 4th  He had been seen here as an outpatient for right lower extremity cellulitis and treated with clindamycin  He did not have any response after 3 days and was referred for hospitalization  He was treated with IV Ancef with significant improvement in erythema and pain  Discharged on Keflex  Finished several days ago     Continues to have significant reduction in pain and redness  No fever or chills  However he still has a lot of swelling in his legs especially on the right  Has a standing job   Stands for hours at a time  He has difficulty with prolonged sitting due to his chronic back pain  O: /80   Pulse 100   Temp (!) 97 2 °F (36 2 °C) (Oral)   Resp 16   Ht 6' 3" (1 905 m)   Wt (!) 144 kg (318 lb)   BMI 39 75 kg/m²   BP by me 130/82 large cuff  Right lower extremity has mild erythema from the knee to the dorsum of the foot  There is scaling of the dorsum of the foot  He has 1 to 2+  pitting edema of the foot and lower leg  Left lower extremity has no erythema but does have 1+ pitting edema    Assessment  1  Right lower extremity cellulitis-significant improvement  2  Edema of the lower extremities-likely due to venous stasis aggravated by his prolonged standing  Will treat with a course of diuretics and continue leg elevation  Would likely benefit from compression stockings specially in light of his occupation    Plan  Rx for Lasix 20 mg daily  Should eat a banana daily  Keep legs elevated  Recheck 1 week    Anticipate return to work approximately 2 weeks

## 2020-01-15 NOTE — TELEPHONE ENCOUNTER
We received a fax today with [facility] paperwork for the patient  What is the diagnosis for this? I did fill out what we could so far, in your bin for review

## 2020-01-22 ENCOUNTER — OFFICE VISIT (OUTPATIENT)
Dept: FAMILY MEDICINE CLINIC | Facility: MEDICAL CENTER | Age: 45
End: 2020-01-22
Payer: COMMERCIAL

## 2020-01-22 ENCOUNTER — APPOINTMENT (OUTPATIENT)
Dept: LAB | Facility: MEDICAL CENTER | Age: 45
End: 2020-01-22
Payer: COMMERCIAL

## 2020-01-22 VITALS
RESPIRATION RATE: 16 BRPM | WEIGHT: 315 LBS | SYSTOLIC BLOOD PRESSURE: 136 MMHG | DIASTOLIC BLOOD PRESSURE: 84 MMHG | HEIGHT: 75 IN | BODY MASS INDEX: 39.17 KG/M2 | HEART RATE: 90 BPM | TEMPERATURE: 97.9 F

## 2020-01-22 DIAGNOSIS — E03.9 ACQUIRED HYPOTHYROIDISM: ICD-10-CM

## 2020-01-22 DIAGNOSIS — L03.115 CELLULITIS OF RIGHT LOWER EXTREMITY: ICD-10-CM

## 2020-01-22 DIAGNOSIS — I10 HYPERTENSION, UNSPECIFIED TYPE: Primary | ICD-10-CM

## 2020-01-22 LAB
ANION GAP SERPL CALCULATED.3IONS-SCNC: 3 MMOL/L (ref 4–13)
BUN SERPL-MCNC: 21 MG/DL (ref 5–25)
CALCIUM SERPL-MCNC: 9.5 MG/DL (ref 8.3–10.1)
CHLORIDE SERPL-SCNC: 100 MMOL/L (ref 100–108)
CO2 SERPL-SCNC: 31 MMOL/L (ref 21–32)
CREAT SERPL-MCNC: 1.15 MG/DL (ref 0.6–1.3)
GFR SERPL CREATININE-BSD FRML MDRD: 77 ML/MIN/1.73SQ M
GLUCOSE SERPL-MCNC: 89 MG/DL (ref 65–140)
POTASSIUM SERPL-SCNC: 4.3 MMOL/L (ref 3.5–5.3)
SODIUM SERPL-SCNC: 134 MMOL/L (ref 136–145)
TSH SERPL DL<=0.05 MIU/L-ACNC: 8.74 UIU/ML (ref 0.36–3.74)

## 2020-01-22 PROCEDURE — 84443 ASSAY THYROID STIM HORMONE: CPT

## 2020-01-22 PROCEDURE — 80048 BASIC METABOLIC PNL TOTAL CA: CPT | Performed by: FAMILY MEDICINE

## 2020-01-22 PROCEDURE — 3075F SYST BP GE 130 - 139MM HG: CPT | Performed by: FAMILY MEDICINE

## 2020-01-22 PROCEDURE — 1111F DSCHRG MED/CURRENT MED MERGE: CPT | Performed by: FAMILY MEDICINE

## 2020-01-22 PROCEDURE — 99213 OFFICE O/P EST LOW 20 MIN: CPT | Performed by: FAMILY MEDICINE

## 2020-01-22 PROCEDURE — 3079F DIAST BP 80-89 MM HG: CPT | Performed by: FAMILY MEDICINE

## 2020-01-22 PROCEDURE — 36415 COLL VENOUS BLD VENIPUNCTURE: CPT | Performed by: FAMILY MEDICINE

## 2020-01-23 ENCOUNTER — TELEPHONE (OUTPATIENT)
Dept: FAMILY MEDICINE CLINIC | Facility: MEDICAL CENTER | Age: 45
End: 2020-01-23

## 2020-01-23 NOTE — TELEPHONE ENCOUNTER
----- Message from Olamide Gupta MD sent at 1/23/2020 11:05 AM EST -----  Notify his TSH is high--compliance with levothyroxine?

## 2020-01-23 NOTE — TELEPHONE ENCOUNTER
Moustapha Erazo MD  P [facility]             Notify potassium is normal   Should eat a banana a day and we will discuss at his visit

## 2020-01-23 NOTE — TELEPHONE ENCOUNTER
Spoke with Tigre Ayala, aware of results  He is very compliant, may have missed only one dose over the last several weeks  Takes both the 200 mcg and 25 mcg every day  Please advise?

## 2020-01-23 NOTE — PROGRESS NOTES
Jer Perez is here for follow-up of his cellulitis and his edema  He was seen here 1 week ago and started on Lasix 20 mg daily  He said it has helped his swelling  His leg is feeling much better  No fever or chills  O: /84 (Cuff Size: Large)   Pulse 90   Temp 97 9 °F (36 6 °C)   Resp 16   Ht 6' 3" (1 905 m)   Wt (!) 144 kg (316 lb 6 oz)   BMI 39 54 kg/m²    Lower extremities examined  Right lower extremity has trace to 1+ pitting edema of the pretibial area with trace edema in the foot  Erythema is significantly improved    Assessment  1  Cellulitis-resolved  2  Edema right lower extremity-responding to diuresis  We discussed importance of compression stockings and leg elevation  Encouraged weight loss    Plan  Will check a BMP  Continue Clarissa 20 mg daily  Rx for compression stockings  Recheck 1 week

## 2020-01-27 NOTE — TELEPHONE ENCOUNTER
Aware- now c/o burning when he urinates  Is staying well hydrated  Has  appt Wednesday    Please advise

## 2020-01-28 ENCOUNTER — HOSPITAL ENCOUNTER (EMERGENCY)
Facility: HOSPITAL | Age: 45
Discharge: HOME | End: 2020-01-28
Attending: EMERGENCY MEDICINE
Payer: COMMERCIAL

## 2020-01-28 ENCOUNTER — APPOINTMENT (EMERGENCY)
Dept: CT IMAGING | Facility: HOSPITAL | Age: 45
End: 2020-01-28
Payer: COMMERCIAL

## 2020-01-28 ENCOUNTER — APPOINTMENT (EMERGENCY)
Dept: ULTRASOUND IMAGING | Facility: HOSPITAL | Age: 45
End: 2020-01-28
Payer: COMMERCIAL

## 2020-01-28 ENCOUNTER — TELEPHONE (OUTPATIENT)
Dept: FAMILY MEDICINE CLINIC | Facility: MEDICAL CENTER | Age: 45
End: 2020-01-28

## 2020-01-28 VITALS
SYSTOLIC BLOOD PRESSURE: 135 MMHG | DIASTOLIC BLOOD PRESSURE: 69 MMHG | RESPIRATION RATE: 20 BRPM | TEMPERATURE: 98.4 F | BODY MASS INDEX: 40.12 KG/M2 | WEIGHT: 315 LBS | OXYGEN SATURATION: 97 % | HEART RATE: 79 BPM

## 2020-01-28 DIAGNOSIS — R10.9 RIGHT FLANK PAIN: Primary | ICD-10-CM

## 2020-01-28 DIAGNOSIS — R39.9 URINARY TRACT INFECTION SYMPTOMS: Primary | ICD-10-CM

## 2020-01-28 LAB
ANION GAP SERPL CALCULATED.3IONS-SCNC: 7 MMOL/L (ref 4–13)
BACTERIA UR QL AUTO: ABNORMAL /HPF
BASOPHILS # BLD AUTO: 0.03 THOUSANDS/ΜL (ref 0–0.1)
BASOPHILS NFR BLD AUTO: 0 % (ref 0–1)
BILIRUB UR QL STRIP: NEGATIVE
BUN SERPL-MCNC: 18 MG/DL (ref 5–25)
CALCIUM SERPL-MCNC: 8.7 MG/DL (ref 8.3–10.1)
CHLORIDE SERPL-SCNC: 101 MMOL/L (ref 100–108)
CLARITY UR: ABNORMAL
CO2 SERPL-SCNC: 31 MMOL/L (ref 21–32)
COLOR UR: YELLOW
CREAT SERPL-MCNC: 1.33 MG/DL (ref 0.6–1.3)
EOSINOPHIL # BLD AUTO: 0.15 THOUSAND/ΜL (ref 0–0.61)
EOSINOPHIL NFR BLD AUTO: 1 % (ref 0–6)
ERYTHROCYTE [DISTWIDTH] IN BLOOD BY AUTOMATED COUNT: 13.4 % (ref 11.6–15.1)
GFR SERPL CREATININE-BSD FRML MDRD: 65 ML/MIN/1.73SQ M
GLUCOSE SERPL-MCNC: 134 MG/DL (ref 65–140)
GLUCOSE UR STRIP-MCNC: NEGATIVE MG/DL
HCT VFR BLD AUTO: 37.3 % (ref 36.5–49.3)
HGB BLD-MCNC: 12.4 G/DL (ref 12–17)
HGB UR QL STRIP.AUTO: ABNORMAL
IMM GRANULOCYTES # BLD AUTO: 0.08 THOUSAND/UL (ref 0–0.2)
IMM GRANULOCYTES NFR BLD AUTO: 1 % (ref 0–2)
KETONES UR STRIP-MCNC: NEGATIVE MG/DL
LEUKOCYTE ESTERASE UR QL STRIP: ABNORMAL
LYMPHOCYTES # BLD AUTO: 1.71 THOUSANDS/ΜL (ref 0.6–4.47)
LYMPHOCYTES NFR BLD AUTO: 16 % (ref 14–44)
MCH RBC QN AUTO: 27.9 PG (ref 26.8–34.3)
MCHC RBC AUTO-ENTMCNC: 33.2 G/DL (ref 31.4–37.4)
MCV RBC AUTO: 84 FL (ref 82–98)
MONOCYTES # BLD AUTO: 0.73 THOUSAND/ΜL (ref 0.17–1.22)
MONOCYTES NFR BLD AUTO: 7 % (ref 4–12)
NEUTROPHILS # BLD AUTO: 8.06 THOUSANDS/ΜL (ref 1.85–7.62)
NEUTS SEG NFR BLD AUTO: 75 % (ref 43–75)
NITRITE UR QL STRIP: POSITIVE
NON-SQ EPI CELLS URNS QL MICRO: ABNORMAL /HPF
NRBC BLD AUTO-RTO: 0 /100 WBCS
PH UR STRIP.AUTO: 5.5 [PH]
PLATELET # BLD AUTO: 214 THOUSANDS/UL (ref 149–390)
PMV BLD AUTO: 9.9 FL (ref 8.9–12.7)
POTASSIUM SERPL-SCNC: 3.9 MMOL/L (ref 3.5–5.3)
PROT UR STRIP-MCNC: ABNORMAL MG/DL
RBC # BLD AUTO: 4.45 MILLION/UL (ref 3.88–5.62)
RBC #/AREA URNS AUTO: ABNORMAL /HPF
SODIUM SERPL-SCNC: 139 MMOL/L (ref 136–145)
SP GR UR STRIP.AUTO: 1.01 (ref 1–1.03)
UROBILINOGEN UR QL STRIP.AUTO: 1 E.U./DL
WBC # BLD AUTO: 10.76 THOUSAND/UL (ref 4.31–10.16)
WBC #/AREA URNS AUTO: ABNORMAL /HPF

## 2020-01-28 PROCEDURE — 99285 EMERGENCY DEPT VISIT HI MDM: CPT | Performed by: EMERGENCY MEDICINE

## 2020-01-28 PROCEDURE — 76705 ECHO EXAM OF ABDOMEN: CPT | Performed by: EMERGENCY MEDICINE

## 2020-01-28 PROCEDURE — 96375 TX/PRO/DX INJ NEW DRUG ADDON: CPT

## 2020-01-28 PROCEDURE — 74176 CT ABD & PELVIS W/O CONTRAST: CPT

## 2020-01-28 PROCEDURE — 80048 BASIC METABOLIC PNL TOTAL CA: CPT | Performed by: EMERGENCY MEDICINE

## 2020-01-28 PROCEDURE — 96374 THER/PROPH/DIAG INJ IV PUSH: CPT

## 2020-01-28 PROCEDURE — 96376 TX/PRO/DX INJ SAME DRUG ADON: CPT

## 2020-01-28 PROCEDURE — 87086 URINE CULTURE/COLONY COUNT: CPT | Performed by: EMERGENCY MEDICINE

## 2020-01-28 PROCEDURE — 85025 COMPLETE CBC W/AUTO DIFF WBC: CPT | Performed by: EMERGENCY MEDICINE

## 2020-01-28 PROCEDURE — 76870 US EXAM SCROTUM: CPT

## 2020-01-28 PROCEDURE — 81001 URINALYSIS AUTO W/SCOPE: CPT | Performed by: EMERGENCY MEDICINE

## 2020-01-28 PROCEDURE — 36415 COLL VENOUS BLD VENIPUNCTURE: CPT | Performed by: EMERGENCY MEDICINE

## 2020-01-28 PROCEDURE — 99284 EMERGENCY DEPT VISIT MOD MDM: CPT

## 2020-01-28 RX ORDER — MORPHINE SULFATE 10 MG/ML
10 INJECTION, SOLUTION INTRAMUSCULAR; INTRAVENOUS ONCE
Status: COMPLETED | OUTPATIENT
Start: 2020-01-28 | End: 2020-01-28

## 2020-01-28 RX ORDER — CEFDINIR 300 MG/1
300 CAPSULE ORAL EVERY 12 HOURS SCHEDULED
Qty: 19 CAPSULE | Refills: 0 | Status: SHIPPED | OUTPATIENT
Start: 2020-01-28 | End: 2020-01-31 | Stop reason: ALTCHOICE

## 2020-01-28 RX ORDER — LORAZEPAM 2 MG/ML
0.5 INJECTION INTRAMUSCULAR ONCE
Status: COMPLETED | OUTPATIENT
Start: 2020-01-28 | End: 2020-01-28

## 2020-01-28 RX ORDER — CEFDINIR 300 MG/1
300 CAPSULE ORAL EVERY 12 HOURS SCHEDULED
Status: DISCONTINUED | OUTPATIENT
Start: 2020-01-28 | End: 2020-01-28 | Stop reason: HOSPADM

## 2020-01-28 RX ADMIN — LORAZEPAM 0.5 MG: 2 INJECTION INTRAMUSCULAR; INTRAVENOUS at 15:16

## 2020-01-28 RX ADMIN — MORPHINE SULFATE 10 MG: 10 INJECTION INTRAVENOUS at 14:11

## 2020-01-28 RX ADMIN — CEFDINIR 300 MG: 300 CAPSULE ORAL at 18:56

## 2020-01-28 RX ADMIN — MORPHINE SULFATE 10 MG: 10 INJECTION INTRAVENOUS at 18:50

## 2020-01-28 NOTE — ED NOTES
Patient unable to complete CT scan due to feeling anxious  Pt states, "either sedate me or I am not doing it   I would rather die " will make MD aware        Edyta Duffy RN  01/28/20 5702

## 2020-01-28 NOTE — TELEPHONE ENCOUNTER
Got our vm to collect Ua  Aba Quinteros took AZO so his urine is discolored  Now has flank pain and testicular pain, no fever  Wondering if this could be form the Lasix?  Please advise

## 2020-01-28 NOTE — TELEPHONE ENCOUNTER
dianna-  Spoke with Dr Christiane Watson  Suspect kidney stone, needs imaging  I can see him now but will need to make arrangements for out patient scheduling for a stat study  Due to his symptoms and not feeling good the best option would be assessment at the ER and will be able to expedite the imaging and Dx with treatment   Dia Centeno aware- they will go to the Er    I called [facility] and gave  Report

## 2020-01-28 NOTE — DISCHARGE INSTRUCTIONS
Diagnosis; right flank pain ;; hypervascular right epididymus     - activity as tolerated     - take your pain medications as before     - cefdinir - antibiotic- 1 capsule 2 times a day for  10 days    - pleasE CALL THE UROLOGIST TOMORROW TO SCHEDULE AN APPOINTMENT- YOU CAN SEE ANYONE IN THE GROUP     - PLEASE RETURN TO  THE ER FOR ANY FEVERS- TEMPO > 100 4/ ANY PERSISTENT VOMITING/ ANY WORSENING/ INTRACTABLE PAIN OR ANY NEW/ WORSENING/CONCERNING SYMPTOMS TO YOU

## 2020-01-29 ENCOUNTER — OFFICE VISIT (OUTPATIENT)
Dept: FAMILY MEDICINE CLINIC | Facility: MEDICAL CENTER | Age: 45
End: 2020-01-29
Payer: COMMERCIAL

## 2020-01-29 VITALS
SYSTOLIC BLOOD PRESSURE: 120 MMHG | RESPIRATION RATE: 16 BRPM | WEIGHT: 315 LBS | DIASTOLIC BLOOD PRESSURE: 74 MMHG | BODY MASS INDEX: 40 KG/M2 | HEART RATE: 88 BPM | TEMPERATURE: 97.8 F

## 2020-01-29 DIAGNOSIS — N45.1 EPIDIDYMITIS: ICD-10-CM

## 2020-01-29 DIAGNOSIS — E03.9 ACQUIRED HYPOTHYROIDISM: Primary | ICD-10-CM

## 2020-01-29 DIAGNOSIS — L03.115 CELLULITIS OF RIGHT LEG: ICD-10-CM

## 2020-01-29 LAB — BACTERIA UR CULT: ABNORMAL

## 2020-01-29 PROCEDURE — 3074F SYST BP LT 130 MM HG: CPT | Performed by: FAMILY MEDICINE

## 2020-01-29 PROCEDURE — 99213 OFFICE O/P EST LOW 20 MIN: CPT | Performed by: FAMILY MEDICINE

## 2020-01-29 PROCEDURE — 3079F DIAST BP 80-89 MM HG: CPT | Performed by: FAMILY MEDICINE

## 2020-01-29 PROCEDURE — 1036F TOBACCO NON-USER: CPT | Performed by: FAMILY MEDICINE

## 2020-01-29 PROCEDURE — 1111F DSCHRG MED/CURRENT MED MERGE: CPT | Performed by: FAMILY MEDICINE

## 2020-01-29 NOTE — ED PROVIDER NOTES
History  Chief Complaint   Patient presents with    Testicle Pain     c/o right testicular pain with swelling that started around 1200    Flank Pain     c/o right flank pain that radiates into RLQ with urinary "stinging" with "a touch of blood when I finished peeing" this morning around 0800     40 yr male with hx of chronic back pain opiate tolerant-- c/o onset of r flak/ r low bcak pain - different than usual back pain - with blood in urine/ r testicle pain - has a hx of epididymitis in past - no fevers- no n/v- no injury      History provided by:  Patient   used: No    Testicle Pain   Associated symptoms: no myalgias    Flank Pain   Associated symptoms: hematuria    Associated symptoms: no dysuria        Prior to Admission Medications   Prescriptions Last Dose Informant Patient Reported? Taking? XTAMPZA ER 36 MG C12A   Yes No   cephalexin (KEFLEX) 500 mg capsule   Yes No   Sig: Take 500 mg by mouth 4 (four) times a day   furosemide (LASIX) 20 mg tablet   No No   Sig: Take 1 tablet (20 mg total) by mouth daily   levothyroxine 200 mcg tablet   No No   Sig: TAKE 1 TABLET (200 MCG TOTAL) BY MOUTH DAILY AS DIRECTED   levothyroxine 25 mcg tablet   No No   Sig: TAKE 1 TABLET BY MOUTH EVERY DAY   oxyCODONE (ROXICODONE) 15 mg immediate release tablet   Yes No      Facility-Administered Medications: None       Past Medical History:   Diagnosis Date    Nausea & vomiting 1/2/2020       Past Surgical History:   Procedure Laterality Date    APPENDECTOMY      RESOLVED: 2000    CHOLECYSTECTOMY      RESOLVED: 2004    CHOLECYSTECTOMY      LUMBAR LAMINECTOMY      UMBILICAL HERNIA REPAIR      RESOLVED: 4052    UMBILICAL HERNIA REPAIR         Family History   Problem Relation Age of Onset    Cirrhosis Mother         HEPATIC    Cirrhosis Father         HEPATIC     I have reviewed and agree with the history as documented      Social History     Tobacco Use    Smoking status: Former Smoker    Smokeless tobacco: Never Used   Substance Use Topics    Alcohol use: No    Drug use: No        Review of Systems   Constitutional: Negative  HENT: Negative  Eyes: Negative  Respiratory: Negative  Cardiovascular: Negative  Gastrointestinal: Negative  Endocrine: Negative  Genitourinary: Positive for flank pain, hematuria and testicular pain  Negative for decreased urine volume, difficulty urinating, discharge, dysuria, enuresis, frequency, genital sores, penile pain, penile swelling, scrotal swelling and urgency  Musculoskeletal: Positive for back pain  Negative for arthralgias, gait problem, joint swelling, myalgias, neck pain and neck stiffness  Skin: Negative  Allergic/Immunologic: Negative  Neurological: Negative  Hematological: Negative  Psychiatric/Behavioral: Negative  Physical Exam  Physical Exam   Constitutional: He is oriented to person, place, and time  He appears well-developed and well-nourished  No distress  avss-- pulse ox 97 % on ra- interpretation is normal- no intervention    HENT:   Head: Normocephalic and atraumatic  Eyes: Pupils are equal, round, and reactive to light  Conjunctivae and EOM are normal  Right eye exhibits no discharge  Left eye exhibits no discharge  No scleral icterus  Mm pink   Neck: Normal range of motion  Neck supple  No JVD present  No tracheal deviation present  No thyromegaly present  Cardiovascular: Normal rate, regular rhythm, normal heart sounds and intact distal pulses  Exam reveals no gallop and no friction rub  No murmur heard  Pulmonary/Chest: Effort normal and breath sounds normal  No stridor  No respiratory distress  He has no wheezes  He has no rales  He exhibits no tenderness  Abdominal: Soft  Bowel sounds are normal  He exhibits no distension and no mass  There is tenderness  There is no rebound and no guarding  No hernia     No cva tenderness-- mild r mid lateral abdominal tenderness- no peritoneal signs- Genitourinary: Penis normal    Genitourinary Comments: Mild r superior testicle tenderness- no edema/ overlying erythema-  Normal lie // no hernias appreicated   Musculoskeletal: Normal range of motion  He exhibits no edema, tenderness or deformity  Equal bilateral radial/dp pulses- no ble edema/calf tenderness/asym/ erythema   Lymphadenopathy:     He has no cervical adenopathy  Neurological: He is alert and oriented to person, place, and time  No cranial nerve deficit or sensory deficit  He exhibits normal muscle tone  Coordination normal    Skin: Skin is warm  Capillary refill takes less than 2 seconds  No rash noted  He is not diaphoretic  No erythema  No pallor  Psychiatric: He has a normal mood and affect  His behavior is normal  Judgment and thought content normal    Nursing note and vitals reviewed  Vital Signs  ED Triage Vitals [01/28/20 1308]   Temperature Pulse Respirations Blood Pressure SpO2   98 4 °F (36 9 °C) 90 18 153/67 95 %      Temp Source Heart Rate Source Patient Position - Orthostatic VS BP Location FiO2 (%)   Oral Monitor Sitting Left arm --      Pain Score       8           Vitals:    01/28/20 1308 01/28/20 1412 01/28/20 1853   BP: 153/67 144/85 135/69   Pulse: 90 85 79   Patient Position - Orthostatic VS: Sitting Sitting Lying         Visual Acuity      ED Medications  Medications   morphine (PF) 10 mg/mL injection 10 mg (10 mg Intravenous Given 1/28/20 1411)   LORazepam (ATIVAN) 2 mg/mL injection 0 5 mg (0 5 mg Intravenous Given 1/28/20 1516)   morphine (PF) 10 mg/mL injection 10 mg (10 mg Intravenous Given 1/28/20 1850)       Diagnostic Studies  Results Reviewed     Procedure Component Value Units Date/Time    Urine culture [[id number]] Collected:  01/28/20 1637    Lab Status:   In process Specimen:  Urine, Clean Catch Updated:  01/28/20 5691    Basic metabolic panel [[id number]]  (Abnormal) Collected:  01/28/20 1510    Lab Status:  Final result Specimen:  Blood from Arm, Right Updated:  01/28/20 1534     Sodium 139 mmol/L      Potassium 3 9 mmol/L      Chloride 101 mmol/L      CO2 31 mmol/L      ANION GAP 7 mmol/L      BUN 18 mg/dL      Creatinine 1 33 mg/dL      Glucose 134 mg/dL      Calcium 8 7 mg/dL      eGFR 65 ml/min/1 73sq m     Narrative:       [place] guidelines for Chronic Kidney Disease (CKD):     Stage 1 with normal or high GFR (GFR > 90 mL/min/1 73 square meters)    Stage 2 Mild CKD (GFR = 60-89 mL/min/1 73 square meters)    Stage 3A Moderate CKD (GFR = 45-59 mL/min/1 73 square meters)    Stage 3B Moderate CKD (GFR = 30-44 mL/min/1 73 square meters)    Stage 4 Severe CKD (GFR = 15-29 mL/min/1 73 square meters)    Stage 5 End Stage CKD (GFR <15 mL/min/1 73 square meters)  Note: GFR calculation is accurate only with a steady state creatinine    CBC and differential [[id number]]  (Abnormal) Collected:  01/28/20 1510    Lab Status:  Final result Specimen:  Blood from Arm, Right Updated:  01/28/20 1526     WBC 10 76 Thousand/uL      RBC 4 45 Million/uL      Hemoglobin 12 4 g/dL      Hematocrit 37 3 %      MCV 84 fL      MCH 27 9 pg      MCHC 33 2 g/dL      RDW 13 4 %      MPV 9 9 fL      Platelets 068 Thousands/uL      nRBC 0 /100 WBCs      Neutrophils Relative 75 %      Immat GRANS % 1 %      Lymphocytes Relative 16 %      Monocytes Relative 7 %      Eosinophils Relative 1 %      Basophils Relative 0 %      Neutrophils Absolute 8 06 Thousands/µL      Immature Grans Absolute 0 08 Thousand/uL      Lymphocytes Absolute 1 71 Thousands/µL      Monocytes Absolute 0 73 Thousand/µL      Eosinophils Absolute 0 15 Thousand/µL      Basophils Absolute 0 03 Thousands/µL     Urine Microscopic [[id number]]  (Abnormal) Collected:  01/28/20 1411    Lab Status:  Final result Specimen:  Urine, Clean Catch Updated:  01/28/20 1510     RBC, UA Innumerable /hpf      WBC, UA Innumerable /hpf      Epithelial Cells       Field obscured, unable to enumerate     /hpf Bacteria, UA Occasional /hpf     UA (URINE) with reflex to Scope [[id number]]  (Abnormal) Collected:  01/28/20 1411    Lab Status:  Final result Specimen:  Urine, Clean Catch Updated:  01/28/20 1449     Color, UA Yellow     Clarity, UA Slightly Cloudy     Specific Gravity, UA 1 015     pH, UA 5 5     Leukocytes, UA Large     Nitrite, UA Positive     Protein,  (2+) mg/dl      Glucose, UA Negative mg/dl      Ketones, UA Negative mg/dl      Urobilinogen, UA 1 0 E U /dl      Bilirubin, UA Negative     Blood, UA Large                 US scrotum and testicles   Final Result by Lisette Talbot DO (01/28 1807)   Unremarkable testicles  Enlarged, edematous and hypervascular RIGHT epididymal body and tail likely representing EPIDIDYMITIS  Follow-up recommended in several weeks following treatment to ensure resolution and to exclude underlying epididymal lesion  Normal amounts of    bilateral peritesticular fluid  The study was marked in [facility] for immediate notification  Workstation performed: [id number]         CT renal stone study abdomen pelvis without contrast   Final Result by Joe Buchanan MD (01/28 1656)      Bladder wall thickening and surrounding fat stranding suspicious for cystitis  Persistent hepatic fatty infiltration and hepatosplenomegaly  The study was marked in [facility] for immediate notification              Workstation performed: [id number]                    Procedures  Procedures         ED Course  ED Course as of Jan 29 1217   Tue Jan 28, 2020   1407 Er md procedure note for bedside transabd u/s by er md -   no fluid seen around heart/ in ruq/luq/behind bladder- no r/l hydronephrosis- printer images poor quality       1455 ER MD NOTE- PT- RE-EVALUATED- FEELS MUCH IMPROVED WITH PAIN --  COULD NOT TOLERATE CT SCAN   DO TO CONFINEMENT- WITH ORDER SMALL DOSE OF IV ATIVAN AND GO WITH PT TO CT SCAN                                   MDM      Disposition  Final diagnoses: Right flank pain     Time reflects when diagnosis was documented in both MDM as applicable and the Disposition within this note     Time User Action Codes Description Comment    1/28/2020  6:22 PM Fadia Dominguez Add [R10 9] Right flank pain       ED Disposition     ED Disposition Condition Date/Time Comment    Discharge Stable Tue Jan 28, 2020  6:22 PM Chandu Willis discharge to home/self care  Follow-up Information     Follow up With Specialties Details Why Contact Info    Burt Diaz MD Urology   [address] [facility] [address] [facility]  [phone]            Discharge Medication List as of 1/28/2020  6:26 PM      START taking these medications    Details   cefdinir (OMNICEF) 300 mg capsule Take 1 capsule (300 mg total) by mouth every 12 (twelve) hours for 19 doses, Starting Tue 1/28/2020, Until Fri 2/7/2020, Print         CONTINUE these medications which have NOT CHANGED    Details   cephalexin (KEFLEX) 500 mg capsule Take 500 mg by mouth 4 (four) times a day, Historical Med      furosemide (LASIX) 20 mg tablet Take 1 tablet (20 mg total) by mouth daily, Starting Wed 1/15/2020, Normal      !! levothyroxine 200 mcg tablet TAKE 1 TABLET (200 MCG TOTAL) BY MOUTH DAILY AS DIRECTED, Starting Sun 8/25/2019, Normal      !! levothyroxine 25 mcg tablet TAKE 1 TABLET BY MOUTH EVERY DAY, Normal      oxyCODONE (ROXICODONE) 15 mg immediate release tablet Starting Thu 12/31/2015, Historical Med      [facility] ER 36 MG C12A Starting Thu 1/9/2020, Historical Med       !! - Potential duplicate medications found  Please discuss with provider  No discharge procedures on file      ED Provider  Electronically Signed by           Arianne Gauthier MD  01/29/20 6246

## 2020-01-30 ENCOUNTER — TELEPHONE (OUTPATIENT)
Dept: UROLOGY | Facility: AMBULATORY SURGERY CENTER | Age: 45
End: 2020-01-30

## 2020-01-30 ENCOUNTER — TELEPHONE (OUTPATIENT)
Dept: FAMILY MEDICINE CLINIC | Facility: MEDICAL CENTER | Age: 45
End: 2020-01-30

## 2020-01-30 DIAGNOSIS — R93.89 ABNORMAL CT SCAN: ICD-10-CM

## 2020-01-30 DIAGNOSIS — N45.1 EPIDIDYMITIS: Primary | ICD-10-CM

## 2020-01-30 NOTE — TELEPHONE ENCOUNTER
Salvatore Campbell stopped in   Updated info on the FMLA  Per his employer they advised documenting the new Dx and new out of work date   Also Savlatore Campbell states his testicle flared up last night and was very painful  Came down somewhat this morning    See Mo

## 2020-01-30 NOTE — TELEPHONE ENCOUNTER
Reason for appointment/Complaint/Diagnosis : Dr Gerard Founds pcp needs to set up sameday appointment for er follow up Epididymitis  Requesting patient be called in the morning  Insurance:[employer]  History of Cancer? no      If yes, what kind? no  Previous urologist?   no         Records requested/where?  no  Outside testing/where? no    Location Preference for office visit? [facility] or [place]

## 2020-01-31 ENCOUNTER — OFFICE VISIT (OUTPATIENT)
Dept: UROLOGY | Facility: CLINIC | Age: 45
End: 2020-01-31
Payer: COMMERCIAL

## 2020-01-31 VITALS
HEART RATE: 78 BPM | HEIGHT: 75 IN | WEIGHT: 315 LBS | SYSTOLIC BLOOD PRESSURE: 124 MMHG | BODY MASS INDEX: 39.17 KG/M2 | DIASTOLIC BLOOD PRESSURE: 84 MMHG

## 2020-01-31 DIAGNOSIS — R93.89 ABNORMAL CT SCAN: ICD-10-CM

## 2020-01-31 DIAGNOSIS — N45.1 EPIDIDYMITIS: Primary | ICD-10-CM

## 2020-01-31 LAB
SL AMB  POCT GLUCOSE, UA: NORMAL
SL AMB LEUKOCYTE ESTERASE,UA: NORMAL
SL AMB POCT BILIRUBIN,UA: NORMAL
SL AMB POCT BLOOD,UA: NORMAL
SL AMB POCT CLARITY,UA: CLEAR
SL AMB POCT COLOR,UA: YELLOW
SL AMB POCT KETONES,UA: NORMAL
SL AMB POCT NITRITE,UA: POSITIVE
SL AMB POCT PH,UA: 5
SL AMB POCT SPECIFIC GRAVITY,UA: 1.01
SL AMB POCT URINE PROTEIN: NORMAL
SL AMB POCT UROBILINOGEN: 0.2

## 2020-01-31 PROCEDURE — 81002 URINALYSIS NONAUTO W/O SCOPE: CPT | Performed by: PHYSICIAN ASSISTANT

## 2020-01-31 PROCEDURE — 99204 OFFICE O/P NEW MOD 45 MIN: CPT | Performed by: PHYSICIAN ASSISTANT

## 2020-01-31 RX ORDER — LEVOFLOXACIN 500 MG/1
500 TABLET, FILM COATED ORAL EVERY 24 HOURS
Qty: 14 TABLET | Refills: 0 | Status: SHIPPED | OUTPATIENT
Start: 2020-01-31 | End: 2020-02-14

## 2020-01-31 NOTE — TELEPHONE ENCOUNTER
Called and spoke with patients wife, scheduled him for 1:00pm today in [facility] office with Ford Andre  Aware to come with a comfortably full bladder in case a urine sample is asked to be provided

## 2020-01-31 NOTE — PROGRESS NOTES
There are no diagnoses linked to this encounter  Assessment and plan:       1   -  -    2   -  -     3   -  -     Shelton Pearson PA-C      Chief Complaint     No chief complaint on file  History of Present Illness     Jose Antonio Nguyen is a 40 y o  male patient establishing care with [facility] for Urology for epididymitis  Urine culture performed 01/28/2020 was positive for low volume of gram-negative rian  Urine today continues to be nitrite positive  Laboratory     Lab Results   Component Value Date    CREATININE 1 33 (H) 01/28/2020       No results found for: PSA    Recent Results (from the past 1 hour(s))   POCT urine dip    Collection Time: 01/31/20  1:18 PM   Result Value Ref Range    LEUKOCYTE ESTERASE,UA moderate     NITRITE,UA positive     SL AMB POCT UROBILINOGEN 0 2     POCT URINE PROTEIN -      PH,UA 5 0     BLOOD,UA hemolyzed moderate     SPECIFIC GRAVITY,UA 1 015     KETONES,UA -     BILIRUBIN,UA -     GLUCOSE, UA -      COLOR,UA yellow     CLARITY,UA clear          Review of Systems     Review of Systems              Allergies     No Known Allergies    Physical Exam     Physical Exam      Vital Signs     There were no vitals filed for this visit        Current Medications       Current Outpatient Medications:     cefdinir (OMNICEF) 300 mg capsule, Take 1 capsule (300 mg total) by mouth every 12 (twelve) hours for 19 doses, Disp: 19 capsule, Rfl: 0    furosemide (LASIX) 20 mg tablet, Take 1 tablet (20 mg total) by mouth daily, Disp: 30 tablet, Rfl: 0    levothyroxine 200 mcg tablet, TAKE 1 TABLET (200 MCG TOTAL) BY MOUTH DAILY AS DIRECTED, Disp: 90 tablet, Rfl: 1    levothyroxine 25 mcg tablet, TAKE 1 TABLET BY MOUTH EVERY DAY, Disp: 90 tablet, Rfl: 1    oxyCODONE (ROXICODONE) 15 mg immediate release tablet, , Disp: , Rfl:     XTAMPZA ER 36 MG C12A, , Disp: , Rfl:       Active Problems     Patient Active Problem List   Diagnosis    Hyperlipidemia    Hypothyroidism    Cellulitis of right lower extremity    Chronic back pain    Hypertension         Past Medical History     Past Medical History:   Diagnosis Date    Nausea & vomiting 1/2/2020         Surgical History     Past Surgical History:   Procedure Laterality Date    APPENDECTOMY      RESOLVED: 2000    CHOLECYSTECTOMY      RESOLVED: 2004    CHOLECYSTECTOMY      LUMBAR LAMINECTOMY      UMBILICAL HERNIA REPAIR      RESOLVED: 2157    UMBILICAL HERNIA REPAIR           Family History     Family History   Problem Relation Age of Onset    Cirrhosis Mother         HEPATIC    Cirrhosis Father         HEPATIC         Social History     Social History       Radiology

## 2020-01-31 NOTE — PROGRESS NOTES
Assessment and plan:       1  Epididymitis  - Patient was transition to a 2 week course of Levaquin   - He will return in 3-4 weeks for symptom reassessment  - Should his symptoms not improve over the weekend on this antibiotic, he will contact our office on Monday  Should they worsen, he will proceed to the emergency department  2  Erectile dysfunction and low libido  - Patient does have a decreased testosterone level of 207 drawn several years ago  - Patient is interested in workup for this  I assured him that we would continue working him up for these symptoms once his more immediate concerns are resolved  Adrienne Bruce PA-C      Chief Complaint     Chief Complaint   Patient presents with    epididymitis         History of Present Illness     Elio Zimmerman is a 40 y o  male patient establishing care with [facility] for Urology for epididymitis  He was referred by his primary care provider to be seen on an urgent basis in the office for this  She had started him on Omnicef and he noticed slight worsening of symptoms  Urine culture performed 01/28/2020 was positive for a low volume of gram-negative rods  It was nitrite positive at that time and continues to be nitrite positive today  He does have a history of an additional episode of epididymitis in July of 2016  He was recently treated for right lower extremity cellulitis with clindamycin  He continues to have bothersome and painful right sided scrotal swelling  He also reports a history of erectile dysfunction, low testosterone and libido, and urinary tract infections that were diagnosed at an outside facility          Laboratory     Lab Results   Component Value Date    CREATININE 1 33 (H) 01/28/2020       No results found for: PSA    Recent Results (from the past 1 hour(s))   POCT urine dip    Collection Time: 01/31/20  1:18 PM   Result Value Ref Range    LEUKOCYTE ESTERASE,UA moderate     NITRITE,UA positive SL AMB POCT UROBILINOGEN 0 2     POCT URINE PROTEIN -      PH,UA 5 0     BLOOD,UA hemolyzed moderate     SPECIFIC GRAVITY,UA 1 015     KETONES,UA -     BILIRUBIN,UA -     GLUCOSE, UA -      COLOR,UA yellow     CLARITY,UA clear          Review of Systems     Review of Systems   Constitutional: Negative for chills and fever  HENT: Negative  Eyes: Negative  Respiratory: Negative for shortness of breath  Cardiovascular: Negative for chest pain  Gastrointestinal: Negative for constipation, diarrhea, nausea and vomiting  Genitourinary: Positive for scrotal swelling and testicular pain  Negative for difficulty urinating, enuresis, flank pain, frequency, hematuria and urgency  Musculoskeletal: Negative  Allergies     No Known Allergies    Physical Exam     Physical Exam   Constitutional: He is oriented to person, place, and time  He appears well-developed and well-nourished  No distress  HENT:   Head: Normocephalic and atraumatic  Right Ear: External ear normal    Left Ear: External ear normal    Nose: Nose normal    Eyes: Right eye exhibits no discharge  Left eye exhibits no discharge  No scleral icterus  Cardiovascular: Normal rate  Pulmonary/Chest: Effort normal    Genitourinary: Right testis shows swelling (with erythema) and tenderness  Right testis shows no mass  Right testis is descended  Cremasteric reflex is not absent on the right side  Left testis shows no mass, no swelling and no tenderness  Left testis is descended  Cremasteric reflex is not absent on the left side  Circumcised  No hypospadias, penile erythema or penile tenderness  Musculoskeletal:   Ambulates independently  Neurological: He is alert and oriented to person, place, and time  Skin: Skin is warm and dry  He is not diaphoretic  Psychiatric: He has a normal mood and affect  His behavior is normal  Judgment and thought content normal    Nursing note and vitals reviewed          Vital Signs     Vitals: 01/31/20 1310   BP: 124/84   BP Location: Left arm   Patient Position: Sitting   Cuff Size: Standard   Pulse: 78   Weight: (!) 145 kg (320 lb)   Height: 6' 3" (1 905 m)         Current Medications       Current Outpatient Medications:     cefdinir (OMNICEF) 300 mg capsule, Take 1 capsule (300 mg total) by mouth every 12 (twelve) hours for 19 doses, Disp: 19 capsule, Rfl: 0    furosemide (LASIX) 20 mg tablet, Take 1 tablet (20 mg total) by mouth daily, Disp: 30 tablet, Rfl: 0    levothyroxine 200 mcg tablet, TAKE 1 TABLET (200 MCG TOTAL) BY MOUTH DAILY AS DIRECTED, Disp: 90 tablet, Rfl: 1    levothyroxine 25 mcg tablet, TAKE 1 TABLET BY MOUTH EVERY DAY, Disp: 90 tablet, Rfl: 1    oxyCODONE (ROXICODONE) 15 mg immediate release tablet, , Disp: , Rfl:     XTAMPZA ER 36 MG C12A, , Disp: , Rfl:       Active Problems     Patient Active Problem List   Diagnosis    Hyperlipidemia    Hypothyroidism    Cellulitis of right lower extremity    Chronic back pain    Hypertension         Past Medical History     Past Medical History:   Diagnosis Date    Nausea & vomiting 1/2/2020         Surgical History     Past Surgical History:   Procedure Laterality Date    APPENDECTOMY      RESOLVED: 2000    CHOLECYSTECTOMY      RESOLVED: 2004    CHOLECYSTECTOMY      LUMBAR LAMINECTOMY      UMBILICAL HERNIA REPAIR      RESOLVED: 9949    UMBILICAL HERNIA REPAIR           Family History     Family History   Problem Relation Age of Onset    Cirrhosis Mother         HEPATIC    Cirrhosis Father         HEPATIC         Social History     Social History       Radiology

## 2020-02-04 ENCOUNTER — TELEPHONE (OUTPATIENT)
Dept: FAMILY MEDICINE CLINIC | Facility: MEDICAL CENTER | Age: 45
End: 2020-02-04

## 2020-02-04 NOTE — PROGRESS NOTES
Dante John is here for follow-up of his cellulitis of his right lower extremity  He said it is continues to improve  He has not had much redness and swelling is about back to his baseline  No further fever  He was seen in the emergency department yesterday for right flank and right testicular pain  Diagnosed with epididymitis  Was confirmed by ultrasound  He was started on Omnicef  He still in quite a bit of pain  He has a history of epididymitis  in the past     O: /74 (Cuff Size: Large)   Pulse 88   Temp 97 8 °F (36 6 °C)   Resp 16   Wt (!) 145 kg (320 lb)   BMI 40 00 kg/m²   Right lower extremity shows trace pitting edema  There is only mild erythema  No tenderness  The right scrotum is red and swollen  No inguinal adenopathy    Blood work  1/22 TSH 8 7  Assessment  1  Cellulitis/edema-continues to improve  Will go ahead and get the compression stockings and consider return to work next week  2   Epididymitis-will need to see urology if does not improve  3  Hypothyroidism-he has been compliant had however his TSH is elevated; may be related to the recent illnesses  Will repeat a month    Plan  As above  He is to call with progress in 2 days  TSH 1 month

## 2020-02-04 NOTE — TELEPHONE ENCOUNTER
S/w patient regarding [employer] and disability paperwork   [facility] and has f/u on Feb 28th  was told to stay out until follow up unless he recovers completely prior to appt ,  then he can call them  So the presumed return to work date is now 3-2-2020  Completed the disability form and the medical request form  Dr Jake Mckenna signed the paperwork   Faxed     Per Martha Carrillo we are to hold the " fitness for duty certification" form until right before returning   It is in the nurses bin    He will call us when he is released or plans to return to work

## 2020-02-06 DIAGNOSIS — R60.0 LOCALIZED EDEMA: ICD-10-CM

## 2020-02-07 RX ORDER — FUROSEMIDE 20 MG/1
TABLET ORAL
Qty: 30 TABLET | Refills: 0 | Status: SHIPPED | OUTPATIENT
Start: 2020-02-07 | End: 2020-03-14

## 2020-02-25 NOTE — PROGRESS NOTES
Assessment and plan:       1  Epididymitis  - Patient has had complete symptom resolution   - He was encouraged to continue scrotal support with activity and ibuprofen as needed  2  Erectile dysfunction and low libido  - Patient continues to have erectile dysfunction low libido  He is on chronic narcotic medication   - He was encouraged to seek counseling for psychologic contributors to the symptoms   - He was given a prescription of sildenafil 20 mg and educated on dosing and side effect profile  Emergency department precautions were also provided  - He will return in 3 months for symptom reassessment  Bishop Lane PA-C      Chief Complaint     Chief Complaint   Patient presents with    Epidiymitis         History of Present Illness     Jenna Rivera is a 40 y o  male patient recently seen in consultation for epididymitis  He had been referred on an urgent basis by his primary care provider after starting PENALOZA MARLIN and noticing slight worsening of symptoms  Urine culture performed 01/28/2020 was positive for a low volume of gram-negative rods and was nitrite positive  A continued to be nitrite positive at his appointment on 01/31/2020  He reported a previous episode of epididymitis in July of 2016 as well as a recently episode of right lower extremity cellulitis treated with clindamycin  Patient additionally reports erectile dysfunction, low testosterone, low libido, and urinary tract infections diagnosed at an outside facility previously  Laboratory     Lab Results   Component Value Date    CREATININE 1 33 (H) 01/28/2020       No results found for: PSA    No results found for this or any previous visit (from the past 1 hour(s))  Review of Systems     Review of Systems   Constitutional: Negative for chills, fatigue and fever  HENT: Negative  Eyes: Negative  Respiratory: Negative for shortness of breath  Cardiovascular: Negative for chest pain  Gastrointestinal: Negative for constipation, diarrhea, nausea and vomiting  Genitourinary: Negative for difficulty urinating, dysuria, enuresis, flank pain, frequency, hematuria and urgency  Musculoskeletal: Negative  Allergies     No Known Allergies    Physical Exam     Physical Exam   Constitutional: He is oriented to person, place, and time  He appears well-developed and well-nourished  No distress  Morbidly obese   HENT:   Head: Normocephalic and atraumatic  Right Ear: External ear normal    Left Ear: External ear normal    Nose: Nose normal    Eyes: Right eye exhibits no discharge  Left eye exhibits no discharge  No scleral icterus  Cardiovascular: Normal rate  Pulmonary/Chest: Effort normal    Musculoskeletal:   Ambulates independently   Neurological: He is alert and oriented to person, place, and time  Skin: Skin is warm and dry  He is not diaphoretic  Psychiatric: He has a normal mood and affect  His behavior is normal  Judgment and thought content normal    Nursing note and vitals reviewed          Vital Signs     Vitals:    02/28/20 1109   BP: 122/82   BP Location: Left arm   Patient Position: Sitting   Cuff Size: Standard   Pulse: 82   Weight: (!) 146 kg (321 lb)   Height: 6' 3" (1 905 m)         Current Medications       Current Outpatient Medications:     furosemide (LASIX) 20 mg tablet, TAKE 1 TABLET BY MOUTH EVERY DAY, Disp: 30 tablet, Rfl: 0    levothyroxine 200 mcg tablet, TAKE 1 TABLET (200 MCG TOTAL) BY MOUTH DAILY AS DIRECTED, Disp: 90 tablet, Rfl: 1    levothyroxine 25 mcg tablet, TAKE 1 TABLET BY MOUTH EVERY DAY, Disp: 90 tablet, Rfl: 1    oxyCODONE (ROXICODONE) 15 mg immediate release tablet, , Disp: , Rfl:     XTAMPZA ER 36 MG C12A, , Disp: , Rfl:     sildenafil (REVATIO) 20 mg tablet, Take 1 tablet (20 mg total) by mouth daily as needed (Erectile dysfunction), Disp: 30 tablet, Rfl: 6      Active Problems     Patient Active Problem List   Diagnosis    Hyperlipidemia    Hypothyroidism    Cellulitis of right lower extremity    Chronic back pain    Hypertension         Past Medical History     Past Medical History:   Diagnosis Date    Nausea & vomiting 1/2/2020         Surgical History     Past Surgical History:   Procedure Laterality Date    APPENDECTOMY      RESOLVED: 2000    CHOLECYSTECTOMY      RESOLVED: 2004    CHOLECYSTECTOMY      LUMBAR LAMINECTOMY      UMBILICAL HERNIA REPAIR      RESOLVED: 2243    UMBILICAL HERNIA REPAIR           Family History     Family History   Problem Relation Age of Onset    Cirrhosis Mother         HEPATIC    Cirrhosis Father         HEPATIC         Social History     Social History       Radiology

## 2020-02-28 ENCOUNTER — TELEPHONE (OUTPATIENT)
Dept: UROLOGY | Facility: MEDICAL CENTER | Age: 45
End: 2020-02-28

## 2020-02-28 ENCOUNTER — OFFICE VISIT (OUTPATIENT)
Dept: UROLOGY | Facility: CLINIC | Age: 45
End: 2020-02-28
Payer: COMMERCIAL

## 2020-02-28 VITALS
WEIGHT: 315 LBS | SYSTOLIC BLOOD PRESSURE: 122 MMHG | DIASTOLIC BLOOD PRESSURE: 82 MMHG | HEART RATE: 82 BPM | BODY MASS INDEX: 39.17 KG/M2 | HEIGHT: 75 IN

## 2020-02-28 DIAGNOSIS — N52.9 ERECTILE DYSFUNCTION, UNSPECIFIED ERECTILE DYSFUNCTION TYPE: Primary | ICD-10-CM

## 2020-02-28 PROCEDURE — 3079F DIAST BP 80-89 MM HG: CPT | Performed by: PHYSICIAN ASSISTANT

## 2020-02-28 PROCEDURE — 1036F TOBACCO NON-USER: CPT | Performed by: PHYSICIAN ASSISTANT

## 2020-02-28 PROCEDURE — 3074F SYST BP LT 130 MM HG: CPT | Performed by: PHYSICIAN ASSISTANT

## 2020-02-28 PROCEDURE — 3008F BODY MASS INDEX DOCD: CPT | Performed by: PHYSICIAN ASSISTANT

## 2020-02-28 PROCEDURE — 1111F DSCHRG MED/CURRENT MED MERGE: CPT | Performed by: PHYSICIAN ASSISTANT

## 2020-02-28 PROCEDURE — 99213 OFFICE O/P EST LOW 20 MIN: CPT | Performed by: PHYSICIAN ASSISTANT

## 2020-02-28 RX ORDER — SILDENAFIL CITRATE 20 MG/1
20 TABLET ORAL DAILY PRN
Qty: 30 TABLET | Refills: 6 | Status: SHIPPED | OUTPATIENT
Start: 2020-02-28

## 2020-02-28 NOTE — TELEPHONE ENCOUNTER
Patient was seen in the office today by you for epididymitis and erectile dysfunction and low libido  Is this patient clear to return to work without any limitations?

## 2020-02-28 NOTE — TELEPHONE ENCOUNTER
Called patient - saw Urology today however there is no mention he is ok to return to work  [employer] for duty form  Completed by Dr Arsh Samuel- aware Dr Arsh Samuel will be in Monday  I asked him to call Urology and ask if the doctor could amend his note reflecting work status   Pt aware- he will also drop off the form this afternoon since we are unable to find our copy in the office

## 2020-02-28 NOTE — TELEPHONE ENCOUNTER
Pt received call from pcp office Arlette Ruiz that they will need documentation faxed to their office stating he is cleared by urology to return to work 03/02/20, questions please contact pt directly

## 2020-02-28 NOTE — TELEPHONE ENCOUNTER
Faxed office note from today and this telephone encounter to [address] [place] [address] office ([phone]) as requested

## 2020-02-29 DIAGNOSIS — E03.9 ACQUIRED HYPOTHYROIDISM: ICD-10-CM

## 2020-03-02 ENCOUNTER — APPOINTMENT (OUTPATIENT)
Dept: LAB | Facility: MEDICAL CENTER | Age: 45
End: 2020-03-02
Payer: COMMERCIAL

## 2020-03-02 ENCOUNTER — OFFICE VISIT (OUTPATIENT)
Dept: FAMILY MEDICINE CLINIC | Facility: MEDICAL CENTER | Age: 45
End: 2020-03-02
Payer: COMMERCIAL

## 2020-03-02 VITALS
RESPIRATION RATE: 16 BRPM | HEIGHT: 75 IN | HEART RATE: 80 BPM | TEMPERATURE: 98.3 F | BODY MASS INDEX: 39.17 KG/M2 | DIASTOLIC BLOOD PRESSURE: 82 MMHG | SYSTOLIC BLOOD PRESSURE: 148 MMHG | WEIGHT: 315 LBS

## 2020-03-02 DIAGNOSIS — E03.9 ACQUIRED HYPOTHYROIDISM: Primary | ICD-10-CM

## 2020-03-02 DIAGNOSIS — E03.9 ACQUIRED HYPOTHYROIDISM: ICD-10-CM

## 2020-03-02 DIAGNOSIS — R60.0 LOCALIZED EDEMA: ICD-10-CM

## 2020-03-02 LAB
ANION GAP SERPL CALCULATED.3IONS-SCNC: 5 MMOL/L (ref 4–13)
BUN SERPL-MCNC: 25 MG/DL (ref 5–25)
CALCIUM SERPL-MCNC: 9.3 MG/DL (ref 8.3–10.1)
CHLORIDE SERPL-SCNC: 104 MMOL/L (ref 100–108)
CO2 SERPL-SCNC: 27 MMOL/L (ref 21–32)
CREAT SERPL-MCNC: 1.24 MG/DL (ref 0.6–1.3)
GFR SERPL CREATININE-BSD FRML MDRD: 70 ML/MIN/1.73SQ M
GLUCOSE SERPL-MCNC: 93 MG/DL (ref 65–140)
POTASSIUM SERPL-SCNC: 4 MMOL/L (ref 3.5–5.3)
SODIUM SERPL-SCNC: 136 MMOL/L (ref 136–145)
TSH SERPL DL<=0.05 MIU/L-ACNC: 3.82 UIU/ML (ref 0.36–3.74)

## 2020-03-02 PROCEDURE — 84443 ASSAY THYROID STIM HORMONE: CPT

## 2020-03-02 PROCEDURE — 80048 BASIC METABOLIC PNL TOTAL CA: CPT

## 2020-03-02 PROCEDURE — 3079F DIAST BP 80-89 MM HG: CPT | Performed by: FAMILY MEDICINE

## 2020-03-02 PROCEDURE — 36415 COLL VENOUS BLD VENIPUNCTURE: CPT

## 2020-03-02 PROCEDURE — 1111F DSCHRG MED/CURRENT MED MERGE: CPT | Performed by: FAMILY MEDICINE

## 2020-03-02 PROCEDURE — 1036F TOBACCO NON-USER: CPT | Performed by: FAMILY MEDICINE

## 2020-03-02 PROCEDURE — 3077F SYST BP >= 140 MM HG: CPT | Performed by: FAMILY MEDICINE

## 2020-03-02 PROCEDURE — 99213 OFFICE O/P EST LOW 20 MIN: CPT | Performed by: FAMILY MEDICINE

## 2020-03-02 RX ORDER — LEVOTHYROXINE SODIUM 0.2 MG/1
200 TABLET ORAL DAILY
Qty: 90 TABLET | Refills: 1 | Status: SHIPPED | OUTPATIENT
Start: 2020-03-02 | End: 2020-08-27

## 2020-03-02 RX ORDER — AMOXICILLIN 500 MG/1
500 TABLET, FILM COATED ORAL 3 TIMES DAILY
COMMUNITY
End: 2021-10-25

## 2020-03-02 RX ORDER — IBUPROFEN 800 MG/1
800 TABLET ORAL EVERY 6 HOURS PRN
COMMUNITY

## 2020-03-02 NOTE — TELEPHONE ENCOUNTER
S/w Fidencio Malloy, his lower leg/ foot is swollen again, pitting, getting red  No pain  Was to go back to work today but now not sure   Appt?  Podiatrist ?

## 2020-03-02 NOTE — PROGRESS NOTES
Dennise Daley said that he was doing better with swelling in the right lower leg until several days ago  He started to get more swelling and a little bit of discomfort with walking  He has had no fever chills redness or significant pain as he did previously  He notes he went to get the compression stockings but they did not have them in his size  Therefore he has not been wearing them  He is however taking the Lasix daily  O: /82 (Cuff Size: Large)   Pulse 80   Temp 98 3 °F (36 8 °C)   Resp 16   Ht 6' 3" (1 905 m)   Wt (!) 149 kg (328 lb 2 oz)   BMI 41 01 kg/m²   Right lower extremity has 1+ pitting edema   No significant change in erythema  No exudate  Left lower extremity has trace edema  Assessment  Edema-will send in Rx for compression stockings 20-30 millimeters  Continue keep leg elevated  He will be out of work until we get response  May need referral     Plan  Check BMP due to diuretic  Rx for compression stockings as above  Call with progress

## 2020-03-10 ENCOUNTER — TELEPHONE (OUTPATIENT)
Dept: FAMILY MEDICINE CLINIC | Facility: MEDICAL CENTER | Age: 45
End: 2020-03-10

## 2020-03-10 NOTE — TELEPHONE ENCOUNTER
Pt called with update for Dr Shania Lundberg  He has been wearing the stockings, but is still having the issue with swelling  He has problems wearing shoes and standing because of the swelling  He is wondering what he should do next    Please advise

## 2020-03-11 ENCOUNTER — TELEPHONE (OUTPATIENT)
Dept: FAMILY MEDICINE CLINIC | Facility: MEDICAL CENTER | Age: 45
End: 2020-03-11

## 2020-03-11 NOTE — TELEPHONE ENCOUNTER
Medical Information Request paperwork faxed by [employer] for Dr Larios  to complete and fax back    Paperwork given to Clinical

## 2020-03-12 DIAGNOSIS — R60.0 LOCALIZED EDEMA: ICD-10-CM

## 2020-03-12 DIAGNOSIS — I89.0 LYMPHEDEMA OF BOTH LOWER EXTREMITIES: Primary | ICD-10-CM

## 2020-03-12 NOTE — TELEPHONE ENCOUNTER
Salvatore Plan stopped in and was given info for vascular referral   Reminder he needs the paper work done as soon as possible  Call once completed

## 2020-03-14 RX ORDER — FUROSEMIDE 20 MG/1
TABLET ORAL
Qty: 30 TABLET | Refills: 0 | Status: SHIPPED | OUTPATIENT
Start: 2020-03-14 | End: 2021-10-20

## 2020-03-16 ENCOUNTER — CONSULT (OUTPATIENT)
Dept: VASCULAR SURGERY | Facility: CLINIC | Age: 45
End: 2020-03-16
Payer: COMMERCIAL

## 2020-03-16 VITALS
BODY MASS INDEX: 38.3 KG/M2 | WEIGHT: 308 LBS | TEMPERATURE: 96.8 F | SYSTOLIC BLOOD PRESSURE: 144 MMHG | HEIGHT: 75 IN | HEART RATE: 88 BPM | DIASTOLIC BLOOD PRESSURE: 92 MMHG | RESPIRATION RATE: 16 BRPM

## 2020-03-16 DIAGNOSIS — L03.115 CELLULITIS OF RIGHT LOWER EXTREMITY: ICD-10-CM

## 2020-03-16 DIAGNOSIS — I89.0 LYMPHEDEMA OF BOTH LOWER EXTREMITIES: Primary | ICD-10-CM

## 2020-03-16 DIAGNOSIS — I89.0 LYMPHEDEMA OF RIGHT LOWER EXTREMITY: ICD-10-CM

## 2020-03-16 DIAGNOSIS — M79.89 SWELLING OF LOWER EXTREMITY: ICD-10-CM

## 2020-03-16 PROCEDURE — 3077F SYST BP >= 140 MM HG: CPT | Performed by: SURGERY

## 2020-03-16 PROCEDURE — 99214 OFFICE O/P EST MOD 30 MIN: CPT | Performed by: SURGERY

## 2020-03-16 PROCEDURE — 3008F BODY MASS INDEX DOCD: CPT | Performed by: SURGERY

## 2020-03-16 PROCEDURE — 1036F TOBACCO NON-USER: CPT | Performed by: SURGERY

## 2020-03-16 PROCEDURE — 3080F DIAST BP >= 90 MM HG: CPT | Performed by: SURGERY

## 2020-03-16 NOTE — PATIENT INSTRUCTIONS
Cellulitis of right lower extremity  Right lower extremity cellulitis, admitted in January and treated with antibiotics with resolution of symptoms  Has had worse hardening and swelling of the right leg since then with signs of early Stage I lymphedema  Advised on taking good care of the skin and good hygiene with early evaluation if concerned for signs of infection      Lymphedema of right lower extremity  Early Stage I lymphedema with chronic history of bilateral lower extremity edema, on feet/standing most of the day, likely undiagnosed venous insufficiency  Has some pitting with reduces with elevation  No fibrosis  Never wore compression stockings in the past until recently  Started wearing 20-30mmHg physician prescribed compression stockings with significant overall improvement in swelling  Previous venous duplex was negative for deep venous thrombosis  No venous insufficiency studies have been done in the past      -We discussed the pathophysiology of lymphedema, venous disease, available treatment options and indications for treatment  Discussed that there is no cure for lymphedema but it is a condition that can be managed with lifestyle changes  Likely diagnosis is secondary lymphedema secondary to chronic venous insufficiency and exacerbated by recent infection/cellulitis  -Recommended conservative measures   This includes the daily use of gradient compression socks, periodic leg elevation and regular exercise  -Will obtain bilateral lower extremity venous reflux study  Return to review in 1 month

## 2020-03-16 NOTE — LETTER
March 16, 2020     Cecil Garcia MD  [facility] [address]    Patient: Daquan Tatum   YOB: 1975   Date of Visit: 3/16/2020       Dear Dr Rodriguez Kodak: Thank you for referring Daquan Tatum to me for evaluation  Below are the relevant portions of my assessment and plan of care  Diagnoses and all orders for this visit:    Cellulitis of right lower extremity  Right lower extremity cellulitis, admitted in January and treated with antibiotics with resolution of symptoms  Has had worse hardening and swelling of the right leg since then with signs of early Stage I lymphedema  Advised on taking good care of the skin and good hygiene with early evaluation if concerned for signs of infection      Lymphedema of right lower extremity  Early Stage I lymphedema with chronic history of bilateral lower extremity edema, on feet/standing most of the day, likely undiagnosed venous insufficiency  Has some pitting with reduces with elevation  No fibrosis  Never wore compression stockings in the past until recently  Started wearing 20-30mmHg physician prescribed compression stockings with significant overall improvement in swelling  Previous venous duplex was negative for deep venous thrombosis  No venous insufficiency studies have been done in the past      -We discussed the pathophysiology of lymphedema, venous disease, available treatment options and indications for treatment  Discussed that there is no cure for lymphedema but it is a condition that can be managed with lifestyle changes  Likely diagnosis is secondary lymphedema secondary to chronic venous insufficiency and exacerbated by recent infection/cellulitis  -Recommended conservative measures   This includes the daily use of gradient compression socks, periodic leg elevation and regular exercise  -Will obtain bilateral lower extremity venous reflux study  Return to review in 1 month        If you have questions, please do not hesitate to call me  I look forward to following Rodrigo along with you           Sincerely,        Mariposa Rust MD        CC: No Recipients

## 2020-03-16 NOTE — PROGRESS NOTES
Assessment/Plan:    Cellulitis of right lower extremity  Right lower extremity cellulitis, admitted in January and treated with antibiotics with resolution of symptoms  Has had worse hardening and swelling of the right leg since then with signs of early Stage I lymphedema  Advised on taking good care of the skin and good hygiene with early evaluation if concerned for signs of infection  Lymphedema of right lower extremity  Early Stage I lymphedema with chronic history of bilateral lower extremity edema, on feet/standing most of the day, likely undiagnosed venous insufficiency  Never wore compression stockings in the past until recently  Started wearing 20-30mmHg physician prescribed compression stockings with significant overall improvement in swelling  Previous venous duplex was negative for deep venous thrombosis  No venous insufficiency studies have been done in the past     -We discussed the pathophysiology of lymphedema, venous disease, available treatment options and indications for treatment  Discussed that there is no cure for lymphedema but it is a condition that can be managed with lifestyle changes  Likely diagnosis is secondary lymphedema secondary to chronic venous insufficiency and exacerbated by recent infection/cellulitis  -Recommended conservative measures  This includes the daily use of gradient compression socks, periodic leg elevation and regular exercise  -Will obtain bilateral lower extremity venous reflux study  Return to review in 1 month           Diagnoses and all orders for this visit:    Lymphedema of both lower extremities  -     Ambulatory referral to Vascular Surgery  -     VAS reflux lower limb venous duplex study with reflux assessment, complete bilateral; Future    Lymphedema of right lower extremity    Swelling of lower extremity    Cellulitis of right lower extremity        I have spent 30 minutes with Patient  today in which greater than 50% of this time was spent in counseling/coordination of care regarding Intructions for management, Importance of tx compliance and Impressions  Subjective:      Patient ID: Omayra Taylor is a 40 y o  male  Pt is new to our practice and was referred by Dr Gregorio Rowley MD for evaluation of RLE edema  Pt c/o RLE edema which is mild at this point  Pt does wear compression stockings and elevates his leg when possible  Pt denies any pain to the RLE  Pt had a CT of the Right Lower Extremity and a LEV on 1/2/2020  Pt has a Hx of HTN, RLE Cellulitis and HLD  Pt is not taking any blood thinning medications or statins  HPI  Mr Carlos Garcia is a 39yo obese male with history of chronic bilateral lower extremity swelling, right worse than left, with recent episode of cellulitis in January of this year  He was treated with IV ancef and completed keflex at home with resolution of the redness and significant reduction in swelling  He was prescribed 20-30mmHg compression stockings which he has been wearing which has significantly improved his lower extremity swelling  He was also started on lasix for leg swelling which he is unsure whether that has helped improve his lower extremity swelling  He has no history of DVT or significant varicosities but he stands for most of the day for his occupation and never wore compression stockings until his recent bout of cellulitis  He has no known family history of DVT or varicose veins, no hypercoagulable disorders  He is otherwise fairly healthy and tries to stay active  The following portions of the patient's history were reviewed and updated as appropriate: allergies, current medications, past family history, past medical history, past social history, past surgical history and problem list     Review of Systems   Constitutional: Negative  HENT: Positive for rhinorrhea  Eyes: Negative  Respiratory: Negative  Cardiovascular: Positive for leg swelling (RLE)  Gastrointestinal: Negative  Endocrine: Negative  Genitourinary: Negative  Musculoskeletal: Negative  Skin: Negative  Neurological: Negative  Hematological: Negative  Psychiatric/Behavioral: Negative  I have personally reviewed the ROS entered by MA and agree as documented  Objective:      /92 (BP Location: Right arm, Patient Position: Sitting, Cuff Size: Extra-Large)   Pulse 88   Temp (!) 96 8 °F (36 °C) (Tympanic)   Resp 16   Ht 6' 3" (1 905 m)   Wt (!) 140 kg (308 lb)   BMI 38 50 kg/m²          Physical Exam   Constitutional: He is oriented to person, place, and time  He appears well-developed and well-nourished  HENT:   Head: Normocephalic and atraumatic  Eyes: EOM are normal    Neck: Normal range of motion  Neck supple  Cardiovascular: Normal rate and regular rhythm  Pulses:       Femoral pulses are 2+ on the right side, and 2+ on the left side  Popliteal pulses are 1+ on the right side, and 1+ on the left side  Dorsalis pedis pulses are 1+ on the right side, and 1+ on the left side  Posterior tibial pulses are 1+ on the right side, and 1+ on the left side  Pulmonary/Chest: Effort normal    Abdominal: Soft  Musculoskeletal: Normal range of motion  He exhibits edema  Bilateral pitting edema 1+   Neurological: He is alert and oriented to person, place, and time  Skin: Skin is warm and dry  Capillary refill takes less than 2 seconds  +Stemmer sign right foot  +[place] hump right foot  Mild hardening of skin right calf   Psychiatric: He has a normal mood and affect  His behavior is normal  Judgment and thought content normal    Nursing note and vitals reviewed

## 2020-03-16 NOTE — ASSESSMENT & PLAN NOTE
Early Stage I lymphedema with chronic history of bilateral lower extremity edema, on feet/standing most of the day, likely undiagnosed venous insufficiency  Never wore compression stockings in the past until recently  Started wearing 20-30mmHg physician prescribed compression stockings with significant overall improvement in swelling  Previous venous duplex was negative for deep venous thrombosis  No venous insufficiency studies have been done in the past     -We discussed the pathophysiology of lymphedema, venous disease, available treatment options and indications for treatment  Discussed that there is no cure for lymphedema but it is a condition that can be managed with lifestyle changes  Likely diagnosis is secondary lymphedema secondary to chronic venous insufficiency and exacerbated by recent infection/cellulitis  -Recommended conservative measures  This includes the daily use of gradient compression socks, periodic leg elevation and regular exercise  -Will obtain bilateral lower extremity venous reflux study  Return to review in 1 month

## 2020-03-16 NOTE — ASSESSMENT & PLAN NOTE
Right lower extremity cellulitis, admitted in January and treated with antibiotics with resolution of symptoms  Has had worse hardening and swelling of the right leg since then with signs of early Stage I lymphedema  Advised on taking good care of the skin and good hygiene with early evaluation if concerned for signs of infection

## 2020-03-18 NOTE — TELEPHONE ENCOUNTER
Discussed with Dr Teddy Liang  We need to know Vascular surgery advisement about work   S/w patient   He will call them    I asked that he ask them to either addend the note or call us with the recommendation

## 2020-03-19 NOTE — TELEPHONE ENCOUNTER
Gricelda Ureña is having the venous test friday, he will talk to them then   I asked him to call me Monday with an update

## 2020-03-20 ENCOUNTER — HOSPITAL ENCOUNTER (OUTPATIENT)
Dept: NON INVASIVE DIAGNOSTICS | Facility: CLINIC | Age: 45
Discharge: HOME | End: 2020-03-20
Payer: COMMERCIAL

## 2020-03-20 DIAGNOSIS — I89.0 LYMPHEDEMA OF BOTH LOWER EXTREMITIES: ICD-10-CM

## 2020-03-20 PROCEDURE — 93970 EXTREMITY STUDY: CPT

## 2020-03-23 PROCEDURE — 93970 EXTREMITY STUDY: CPT | Performed by: SURGERY

## 2020-03-23 NOTE — TELEPHONE ENCOUNTER
mltco-  Patient called Vascular office today, he spoke with the nurse who told him they do not write work notes for chronic Lymphoedema   Dr Lon Gutierrez was looking for for advisement if patient should or should not be working since his job requires him to stand all day, most of his shift    So Dr Lon Gutierrez would like us to call their office and discuss this with them prior to his follow up appt in a month   Should stay out of work until then

## 2020-03-24 ENCOUNTER — TELEMEDICINE (OUTPATIENT)
Dept: FAMILY MEDICINE CLINIC | Facility: MEDICAL CENTER | Age: 45
End: 2020-03-24
Payer: COMMERCIAL

## 2020-03-24 ENCOUNTER — TELEPHONE (OUTPATIENT)
Dept: FAMILY MEDICINE CLINIC | Facility: MEDICAL CENTER | Age: 45
End: 2020-03-24

## 2020-03-24 DIAGNOSIS — Z20.828 EXPOSURE TO SARS-ASSOCIATED CORONAVIRUS: Primary | ICD-10-CM

## 2020-03-24 DIAGNOSIS — Z20.828 EXPOSURE TO SARS-ASSOCIATED CORONAVIRUS: ICD-10-CM

## 2020-03-24 PROCEDURE — 99441 PR PHYS/QHP TELEPHONE EVALUATION 5-10 MIN: CPT | Performed by: FAMILY MEDICINE

## 2020-03-24 PROCEDURE — 87635 SARS-COV-2 COVID-19 AMP PRB: CPT

## 2020-03-24 NOTE — TELEPHONE ENCOUNTER
Patient called with concern of family needing self quarantine, needs explanation of self quarantine

## 2020-03-24 NOTE — PROGRESS NOTES
Virtual Regular Visit    Problem List Items Addressed This Visit     None               Reason for visit is fever  Encounter provider Dami Heninng MD    Provider located at [address] [facility]  Martin Andrews [address] [place] [id number]      Recent Visits  No visits were found meeting these conditions  Showing recent visits within past 7 days and meeting all other requirements     Today's Visits  Date Type Provider Dept   03/24/20 Telephone [address] today's visits and meeting all other requirements     Future Appointments  No visits were found meeting these conditions  Showing future appointments within next 150 days and meeting all other requirements        After connecting through [employer], the patient was identified by name and date of birth  Julia Montana was informed that this is a telemedicine visit and that the visit is being conducted through telephone which may not be secure and therefore, might not be HIPAA-compliant  My office door was closed  No one else was in the room  He acknowledged consent and understanding of privacy and security of the video platform  The patient has agreed to participate and understands they can discontinue the visit at any time  Remi Wylie is a 40 y o  male with complaint of fever for 2 days  He had sudden onset of fever 2 days ago after going for a walk  He started with nausea chills and some dizziness  He got underneath a heating blanket and his temp went up to 104 9  However when he took acetaminophen the temperature came down to 98  Since then his temperature has ranged from 100 7-101  He has nasal congestion and achiness which are both better  He denies any cough or shortness of breath or chest discomfort  No diarrhea  No anosmia or dysgeusia          Past Medical History:   Diagnosis Date    Nausea & vomiting 1/2/2020       Past Surgical History:   Procedure Laterality Date    APPENDECTOMY      RESOLVED: 2000    CHOLECYSTECTOMY      RESOLVED: 2004    CHOLECYSTECTOMY      LUMBAR LAMINECTOMY      UMBILICAL HERNIA REPAIR      RESOLVED: 3316    UMBILICAL HERNIA REPAIR         Current Outpatient Medications   Medication Sig Dispense Refill    amoxicillin (AMOXIL) 500 MG tablet Take 500 mg by mouth 3 (three) times a day      furosemide (LASIX) 20 mg tablet TAKE 1 TABLET BY MOUTH EVERY DAY 30 tablet 0    ibuprofen (MOTRIN) 800 mg tablet Take 800 mg by mouth every 6 (six) hours as needed for mild pain      levothyroxine 200 mcg tablet TAKE 1 TABLET (200 MCG TOTAL) BY MOUTH DAILY AS DIRECTED 90 tablet 1    levothyroxine 25 mcg tablet TAKE 1 TABLET BY MOUTH EVERY DAY 90 tablet 1    oxyCODONE (ROXICODONE) 15 mg immediate release tablet       sildenafil (REVATIO) 20 mg tablet Take 1 tablet (20 mg total) by mouth daily as needed (Erectile dysfunction) 30 tablet 6    XTAMPZA ER 36 MG C12A        No current facility-administered medications for this visit  No Known Allergies    Review of Systems   Respiratory: Negative for cough, chest tightness and shortness of breath  Gastrointestinal: Negative for diarrhea  Assessment  Fever    Plan  Will refer for COVID 19 testing  He will continue acetaminophen as needed  Observation of symptoms  He is to contact us if he develops shortness of breath or cough or  further symptoms  Infection precautions were discussed including self quarantine until results are known and avoidance of family members  I spent 10 minutes with the patient during this visit

## 2020-03-24 NOTE — TELEPHONE ENCOUNTER
Pt aware- forms for Short term disability faxed   Postponed message for 3 days prior to vascular follow up appt to call their office

## 2020-03-24 NOTE — TELEPHONE ENCOUNTER
Started two days ago with fever and chills  This morning it is 101 7  Last night he was under a heating blanket and his temp was 104 9  He immediatly got out from under the blanket and took tylenol  It went down to 98  8 he has nasal congestion and dry throat  Denies cough or SOB, no loss of taste or smell  No flare up such as cellulitis symptoms   Has not gone anywhere  Lives with wife and son   Son works at [employer] a [address] [place] [address]   Please advise

## 2020-03-25 NOTE — TELEPHONE ENCOUNTER
S/w Niranjan Jimenez Discussed quarantine questions with patient  Son works for [employer]   States his boss told him he needs a note stating a family member is quarantined, and that he can not work  I asked hi mhe needs to clarify this with employer  Do they mean quarantined due to a positive Covid 19 test or just that they are being tested  That may be the difference  Also aware his would have to contact his PCP for a note if still needed  Niranjan Jimenez says he does nt have a PCP  Not seen with one since a minor , can here years ago, told him to then clarify what he needs for his job and if its still an issue to call back  Rodrigo's wife Mack Moore does not need a work note  Her employment is shut down at this time  Niranjan Jimenez stated he went to his pain management apot last night in [place]  Did not tell them he was being tested and quarantined  He did say they had him wear a mask upon entering the office   See Mo

## 2020-03-26 ENCOUNTER — TELEPHONE (OUTPATIENT)
Dept: FAMILY MEDICINE CLINIC | Facility: MEDICAL CENTER | Age: 45
End: 2020-03-26

## 2020-03-26 NOTE — TELEPHONE ENCOUNTER
Ross Lin called stating Rodrigo's employer wants a letter saying Miriam Solares has been tested for covid 19 and he is on self quarantine  Rodrigo's son works at the same facility and he has stayed home since Tuesday   Ross Lin states Miriam Solares was told by you and the testing place that anyone living in the home should be on self quarantine as well  His job is being persistent  Ok to do the letter?  Fax to [phone]

## 2020-03-29 LAB — SARS-COV-2 RNA SPEC QL NAA+PROBE: NOT DETECTED

## 2020-04-05 DIAGNOSIS — R60.0 LOCALIZED EDEMA: ICD-10-CM

## 2020-04-09 ENCOUNTER — TELEMEDICINE (OUTPATIENT)
Dept: FAMILY MEDICINE CLINIC | Facility: MEDICAL CENTER | Age: 45
End: 2020-04-09

## 2020-04-09 ENCOUNTER — TELEPHONE (OUTPATIENT)
Dept: FAMILY MEDICINE CLINIC | Facility: MEDICAL CENTER | Age: 45
End: 2020-04-09

## 2020-04-09 VITALS — WEIGHT: 286 LBS | HEIGHT: 75 IN | TEMPERATURE: 98.8 F | BODY MASS INDEX: 35.56 KG/M2

## 2020-04-09 DIAGNOSIS — I89.0 LYMPHEDEMA OF RIGHT LOWER EXTREMITY: ICD-10-CM

## 2020-04-09 DIAGNOSIS — E03.9 ACQUIRED HYPOTHYROIDISM: Primary | ICD-10-CM

## 2020-04-09 PROCEDURE — 99212 OFFICE O/P EST SF 10 MIN: CPT | Performed by: FAMILY MEDICINE

## 2020-04-09 PROCEDURE — 3008F BODY MASS INDEX DOCD: CPT | Performed by: PHYSICIAN ASSISTANT

## 2020-04-10 ENCOUNTER — TELEMEDICINE (OUTPATIENT)
Dept: VASCULAR SURGERY | Facility: CLINIC | Age: 45
End: 2020-04-10
Payer: COMMERCIAL

## 2020-04-10 DIAGNOSIS — I89.0 LYMPHEDEMA OF RIGHT LOWER EXTREMITY: Primary | ICD-10-CM

## 2020-04-10 DIAGNOSIS — I87.2 VENOUS INSUFFICIENCY OF BOTH LOWER EXTREMITIES: ICD-10-CM

## 2020-04-10 PROCEDURE — 99213 OFFICE O/P EST LOW 20 MIN: CPT | Performed by: SURGERY

## 2020-04-17 RX ORDER — FUROSEMIDE 20 MG/1
TABLET ORAL
Qty: 30 TABLET | Refills: 0 | OUTPATIENT
Start: 2020-04-17

## 2020-06-02 ENCOUNTER — TELEMEDICINE (OUTPATIENT)
Dept: UROLOGY | Facility: CLINIC | Age: 45
End: 2020-06-02
Payer: COMMERCIAL

## 2020-06-02 ENCOUNTER — TELEPHONE (OUTPATIENT)
Dept: UROLOGY | Facility: CLINIC | Age: 45
End: 2020-06-02

## 2020-06-02 DIAGNOSIS — R79.89 LOW TESTOSTERONE: Primary | ICD-10-CM

## 2020-06-02 PROCEDURE — 99214 OFFICE O/P EST MOD 30 MIN: CPT | Performed by: PHYSICIAN ASSISTANT

## 2020-06-18 ENCOUNTER — TELEPHONE (OUTPATIENT)
Dept: FAMILY MEDICINE CLINIC | Facility: MEDICAL CENTER | Age: 45
End: 2020-06-18

## 2020-06-26 DIAGNOSIS — E03.9 ACQUIRED HYPOTHYROIDISM: ICD-10-CM

## 2020-06-26 RX ORDER — LEVOTHYROXINE SODIUM 0.03 MG/1
TABLET ORAL
Qty: 90 TABLET | Refills: 0 | Status: SHIPPED | OUTPATIENT
Start: 2020-06-26 | End: 2020-09-17

## 2020-08-26 DIAGNOSIS — E03.9 ACQUIRED HYPOTHYROIDISM: ICD-10-CM

## 2020-08-27 RX ORDER — LEVOTHYROXINE SODIUM 0.2 MG/1
200 TABLET ORAL DAILY
Qty: 90 TABLET | Refills: 1 | Status: SHIPPED | OUTPATIENT
Start: 2020-08-27 | End: 2021-02-22

## 2020-09-17 DIAGNOSIS — E03.9 ACQUIRED HYPOTHYROIDISM: ICD-10-CM

## 2020-09-17 RX ORDER — LEVOTHYROXINE SODIUM 0.03 MG/1
TABLET ORAL
Qty: 90 TABLET | Refills: 0 | Status: SHIPPED | OUTPATIENT
Start: 2020-09-17 | End: 2020-10-04

## 2020-09-30 ENCOUNTER — TELEPHONE (OUTPATIENT)
Dept: UROLOGY | Facility: AMBULATORY SURGERY CENTER | Age: 45
End: 2020-09-30

## 2020-09-30 DIAGNOSIS — E03.9 ACQUIRED HYPOTHYROIDISM: ICD-10-CM

## 2020-09-30 NOTE — TELEPHONE ENCOUNTER
Attempted to call, and left a detailed message, ok per communication consent form  Reminded patient to get blood work drawn prior to up coming appointment on 10/08  Office number provided in message  ,

## 2020-10-04 RX ORDER — LEVOTHYROXINE SODIUM 0.03 MG/1
TABLET ORAL
Qty: 90 TABLET | Refills: 0 | Status: SHIPPED | OUTPATIENT
Start: 2020-10-04 | End: 2021-03-15 | Stop reason: SDUPTHER

## 2020-10-07 NOTE — TELEPHONE ENCOUNTER
Called and spoke to patient, and reminded him to get his testosterone blood work done prior to upcoming appointment in 10/08  Patient stated he needs to reschedule the appointment due to him not being able to get out of work  Patient requested to reschedule the appointment for 12/08, when he has a vacation day planned   Appointment has been made for 12/08 at [facility] with Citlali Kiser PA-C at 11:00 am

## 2020-10-22 ENCOUNTER — TELEPHONE (OUTPATIENT)
Dept: FAMILY MEDICINE CLINIC | Facility: MEDICAL CENTER | Age: 45
End: 2020-10-22

## 2020-10-22 DIAGNOSIS — M25.519 SHOULDER PAIN, UNSPECIFIED CHRONICITY, UNSPECIFIED LATERALITY: Primary | ICD-10-CM

## 2020-11-17 ENCOUNTER — CONSULT (OUTPATIENT)
Dept: OBGYN CLINIC | Facility: MEDICAL CENTER | Age: 45
End: 2020-11-17
Payer: COMMERCIAL

## 2020-11-17 ENCOUNTER — APPOINTMENT (OUTPATIENT)
Dept: RADIOLOGY | Facility: MEDICAL CENTER | Age: 45
End: 2020-11-17
Payer: COMMERCIAL

## 2020-11-17 ENCOUNTER — APPOINTMENT (OUTPATIENT)
Dept: LAB | Facility: MEDICAL CENTER | Age: 45
End: 2020-11-17
Payer: COMMERCIAL

## 2020-11-17 VITALS
HEIGHT: 75 IN | SYSTOLIC BLOOD PRESSURE: 165 MMHG | DIASTOLIC BLOOD PRESSURE: 105 MMHG | BODY MASS INDEX: 35.56 KG/M2 | HEART RATE: 58 BPM | TEMPERATURE: 97.5 F | WEIGHT: 286 LBS

## 2020-11-17 DIAGNOSIS — M25.522 PAIN OF BOTH ELBOWS: ICD-10-CM

## 2020-11-17 DIAGNOSIS — M25.512 CHRONIC PAIN OF BOTH SHOULDERS: ICD-10-CM

## 2020-11-17 DIAGNOSIS — G89.29 CHRONIC PAIN OF BOTH SHOULDERS: ICD-10-CM

## 2020-11-17 DIAGNOSIS — M25.511 CHRONIC PAIN OF BOTH SHOULDERS: ICD-10-CM

## 2020-11-17 DIAGNOSIS — M25.50 POLYARTHRALGIA: Primary | ICD-10-CM

## 2020-11-17 DIAGNOSIS — M25.521 PAIN OF BOTH ELBOWS: ICD-10-CM

## 2020-11-17 DIAGNOSIS — M25.50 POLYARTHRALGIA: ICD-10-CM

## 2020-11-17 LAB — CRP SERPL QL: 4 MG/L

## 2020-11-17 PROCEDURE — 86038 ANTINUCLEAR ANTIBODIES: CPT

## 2020-11-17 PROCEDURE — 86140 C-REACTIVE PROTEIN: CPT

## 2020-11-17 PROCEDURE — 86039 ANTINUCLEAR ANTIBODIES (ANA): CPT

## 2020-11-17 PROCEDURE — 86618 LYME DISEASE ANTIBODY: CPT

## 2020-11-17 PROCEDURE — 3008F BODY MASS INDEX DOCD: CPT | Performed by: EMERGENCY MEDICINE

## 2020-11-17 PROCEDURE — 36415 COLL VENOUS BLD VENIPUNCTURE: CPT

## 2020-11-17 PROCEDURE — 86430 RHEUMATOID FACTOR TEST QUAL: CPT

## 2020-11-17 PROCEDURE — 1036F TOBACCO NON-USER: CPT | Performed by: EMERGENCY MEDICINE

## 2020-11-17 PROCEDURE — 99204 OFFICE O/P NEW MOD 45 MIN: CPT | Performed by: EMERGENCY MEDICINE

## 2020-11-17 PROCEDURE — 3080F DIAST BP >= 90 MM HG: CPT | Performed by: EMERGENCY MEDICINE

## 2020-11-17 PROCEDURE — 3077F SYST BP >= 140 MM HG: CPT | Performed by: EMERGENCY MEDICINE

## 2020-11-17 PROCEDURE — 86200 CCP ANTIBODY: CPT

## 2020-11-17 PROCEDURE — 73030 X-RAY EXAM OF SHOULDER: CPT

## 2020-11-17 RX ORDER — MELOXICAM 15 MG/1
15 TABLET ORAL DAILY
Qty: 30 TABLET | Refills: 0 | Status: SHIPPED | OUTPATIENT
Start: 2020-11-17 | End: 2020-12-18

## 2020-11-17 RX ORDER — PREDNISONE 20 MG/1
20 TABLET ORAL 2 TIMES DAILY WITH MEALS
Qty: 10 TABLET | Refills: 0 | Status: SHIPPED | OUTPATIENT
Start: 2020-11-17 | End: 2021-10-20

## 2020-11-18 ENCOUNTER — TELEPHONE (OUTPATIENT)
Dept: OBGYN CLINIC | Facility: MEDICAL CENTER | Age: 45
End: 2020-11-18

## 2020-11-18 LAB
ANA HOMOGEN SER QL IF: NORMAL
ANA HOMOGEN TITR SER: NORMAL {TITER}
RHEUMATOID FACT SER QL LA: NEGATIVE
RYE IGE QN: POSITIVE

## 2020-11-19 DIAGNOSIS — R76.8 POSITIVE ANA (ANTINUCLEAR ANTIBODY): ICD-10-CM

## 2020-11-19 DIAGNOSIS — M25.512 CHRONIC PAIN OF BOTH SHOULDERS: Primary | ICD-10-CM

## 2020-11-19 DIAGNOSIS — M25.511 CHRONIC PAIN OF BOTH SHOULDERS: Primary | ICD-10-CM

## 2020-11-19 DIAGNOSIS — G89.29 CHRONIC PAIN OF BOTH SHOULDERS: Primary | ICD-10-CM

## 2020-11-19 DIAGNOSIS — M25.50 POLYARTHRALGIA: ICD-10-CM

## 2020-11-19 LAB
B BURGDOR IGG+IGM SER-ACNC: 11
CCP IGA+IGG SERPL IA-ACNC: 4 UNITS (ref 0–19)

## 2020-12-18 DIAGNOSIS — M25.50 POLYARTHRALGIA: ICD-10-CM

## 2020-12-18 DIAGNOSIS — M25.511 CHRONIC PAIN OF BOTH SHOULDERS: ICD-10-CM

## 2020-12-18 DIAGNOSIS — M25.512 CHRONIC PAIN OF BOTH SHOULDERS: ICD-10-CM

## 2020-12-18 DIAGNOSIS — G89.29 CHRONIC PAIN OF BOTH SHOULDERS: ICD-10-CM

## 2020-12-18 RX ORDER — MELOXICAM 15 MG/1
TABLET ORAL
Qty: 30 TABLET | Refills: 0 | Status: SHIPPED | OUTPATIENT
Start: 2020-12-18 | End: 2021-10-20

## 2021-02-03 ENCOUNTER — TELEPHONE (OUTPATIENT)
Dept: FAMILY MEDICINE CLINIC | Facility: MEDICAL CENTER | Age: 46
End: 2021-02-03

## 2021-02-03 DIAGNOSIS — Z20.822 EXPOSURE TO COVID-19 VIRUS: Primary | ICD-10-CM

## 2021-02-03 DIAGNOSIS — Z20.822 EXPOSURE TO COVID-19 VIRUS: ICD-10-CM

## 2021-02-03 PROCEDURE — U0005 INFEC AGEN DETEC AMPLI PROBE: HCPCS | Performed by: FAMILY MEDICINE

## 2021-02-03 PROCEDURE — U0003 INFECTIOUS AGENT DETECTION BY NUCLEIC ACID (DNA OR RNA); SEVERE ACUTE RESPIRATORY SYNDROME CORONAVIRUS 2 (SARS-COV-2) (CORONAVIRUS DISEASE [COVID-19]), AMPLIFIED PROBE TECHNIQUE, MAKING USE OF HIGH THROUGHPUT TECHNOLOGIES AS DESCRIBED BY CMS-2020-01-R: HCPCS | Performed by: FAMILY MEDICINE

## 2021-02-03 NOTE — TELEPHONE ENCOUNTER
Pt has been moving the last week and 3 family members that have been helping him tested + for Covid today  He has a fever

## 2021-02-04 LAB — SARS-COV-2 RNA RESP QL NAA+PROBE: NEGATIVE

## 2021-02-10 ENCOUNTER — OFFICE VISIT (OUTPATIENT)
Dept: RHEUMATOLOGY | Facility: CLINIC | Age: 46
End: 2021-02-10
Payer: COMMERCIAL

## 2021-02-10 VITALS
BODY MASS INDEX: 35.56 KG/M2 | DIASTOLIC BLOOD PRESSURE: 82 MMHG | SYSTOLIC BLOOD PRESSURE: 152 MMHG | WEIGHT: 286 LBS | HEART RATE: 76 BPM | HEIGHT: 75 IN

## 2021-02-10 DIAGNOSIS — M25.511 CHRONIC PAIN OF BOTH SHOULDERS: ICD-10-CM

## 2021-02-10 DIAGNOSIS — M25.50 POLYARTHRALGIA: ICD-10-CM

## 2021-02-10 DIAGNOSIS — G89.29 CHRONIC PAIN OF BOTH SHOULDERS: ICD-10-CM

## 2021-02-10 DIAGNOSIS — R76.8 POSITIVE ANA (ANTINUCLEAR ANTIBODY): ICD-10-CM

## 2021-02-10 DIAGNOSIS — M47.819 SPONDYLOARTHRITIS: Primary | ICD-10-CM

## 2021-02-10 DIAGNOSIS — M25.512 CHRONIC PAIN OF BOTH SHOULDERS: ICD-10-CM

## 2021-02-10 PROCEDURE — 99203 OFFICE O/P NEW LOW 30 MIN: CPT | Performed by: INTERNAL MEDICINE

## 2021-02-10 PROCEDURE — 3008F BODY MASS INDEX DOCD: CPT | Performed by: INTERNAL MEDICINE

## 2021-02-10 PROCEDURE — 1036F TOBACCO NON-USER: CPT | Performed by: INTERNAL MEDICINE

## 2021-02-10 RX ORDER — METHYLPREDNISOLONE 8 MG/1
TABLET ORAL
Qty: 33 TABLET | Refills: 2 | Status: SHIPPED | OUTPATIENT
Start: 2021-02-10 | End: 2021-03-02

## 2021-02-10 RX ORDER — SULFASALAZINE 500 MG/1
500 TABLET ORAL 2 TIMES DAILY
Qty: 60 TABLET | Refills: 5 | Status: SHIPPED | OUTPATIENT
Start: 2021-02-10 | End: 2021-10-25

## 2021-02-10 NOTE — PATIENT INSTRUCTIONS
Try over the counter Voltaren gel on your painful joints up to 4x/day  You can try Aspercreme with lidocaine on your painful joints as well  Get back into your walking/exercise routine  We will check blood work in 2 months and I ordered an xray of your sacroiliac joints

## 2021-02-10 NOTE — PROGRESS NOTES
Rheumatology Consult   Lexie Zamorano 39 y o  male 1975    DATE: 2/10/2021    Reason for Consult: arthritis, elevated CRP    Assessment and Plan:  Medrol taper for likely gouty flare and spondyloarthritis  xrays SI joints  Continue ibuprofen PRN  Check uric acid  Check serologies  Topical NSAIDs and analgesics    History of Present Illness:  Lexie Zamorano is a 39 y o  male 40 yo man with lower back pain and stiffness  +malaise  No psoriasis or abd pain  +b/l shoulder pain and stiffness  +lateral epicondylitis in the past   +plantar fasciitis in the past   Some improvement in symptoms on ibuprofen   +gout  Review of Systems  Review of Systems   Constitutional: Negative for chills, fatigue, fever and unexpected weight change  HENT: Negative for mouth sores and trouble swallowing  Eyes: Negative for pain and visual disturbance  Respiratory: Negative for cough and shortness of breath  Cardiovascular: Negative for chest pain and leg swelling  Gastrointestinal: Negative for abdominal pain, blood in stool, constipation, diarrhea and nausea  Musculoskeletal: Positive for arthralgias and back pain  Negative for joint swelling and myalgias  Skin: Negative for color change and rash  Neurological: Negative for weakness and numbness  Hematological: Negative for adenopathy  Psychiatric/Behavioral: Negative for sleep disturbance  Allergies  No Known Allergies    Current Medications      Past Medical History  Past Medical History:   Diagnosis Date    Nausea & vomiting 1/2/2020       Past Surgical History  Past Surgical History:   Procedure Laterality Date    APPENDECTOMY      RESOLVED: 2000    CHOLECYSTECTOMY      RESOLVED: 2004    CHOLECYSTECTOMY      LUMBAR LAMINECTOMY      UMBILICAL HERNIA REPAIR      RESOLVED: 4465    UMBILICAL HERNIA REPAIR         Family History  No known autoimmune or inflammatory diseases in the family     Family History   Problem Relation Age of Onset    Cirrhosis Mother         HEPATIC    Cirrhosis Father         HEPATIC       Social History  Occupation: [employer]  Social History     Substance and Sexual Activity   Alcohol Use No     Social History     Substance and Sexual Activity   Drug Use No     Social History     Tobacco Use   Smoking Status Former Smoker   Smokeless Tobacco Never Used        Objective:  Ht 6' 3" (1 905 m)   BMI 35 75 kg/m²     Physical Exam  Musculoskeletal:         General: Swelling and tenderness present  Right shoulder: He exhibits decreased range of motion and tenderness  Left shoulder: He exhibits decreased range of motion and tenderness  Left foot: Tenderness and swelling present  Lab Results: I have personally reviewed pertinent reports        CBC:   , Chemistry Profile:       Invalid input(s): ALBUMIN, Coagulation Studies:   , Cardiac Studies:   , Additional Labs:   , iSTAT CHEM 8:       Invalid input(s): POTASSIUMIS, ABG:   , Toxicology:   , Last A1C/Lipid Panel/Thyroid Panel:   Lab Results   Component Value Date    HGBA1C 6 2 09/30/2019    HGBA1C 5 6 03/16/2018    TRIG 99 09/30/2019    TRIG 54 03/30/2018    CHOL 220 05/20/2014    HDL 42 09/30/2019    HDL 43 03/30/2018    LDLCALC 84 09/30/2019    LDLCALC 61 03/30/2018    UKY0VMOLCUGT 3 820 (H) 03/02/2020     Lab Results   Component Value Date    CRP 4 0 (H) 11/17/2020    VINH Positive (A) 11/17/2020    RF Negative 11/17/2020           Invalid input(s): URIBILINOGEN         Imaging: I have personally reviewed pertinent films in PACS

## 2021-02-22 DIAGNOSIS — E03.9 ACQUIRED HYPOTHYROIDISM: ICD-10-CM

## 2021-02-22 RX ORDER — LEVOTHYROXINE SODIUM 0.2 MG/1
200 TABLET ORAL DAILY
Qty: 90 TABLET | Refills: 0 | Status: SHIPPED | OUTPATIENT
Start: 2021-02-22 | End: 2021-05-22

## 2021-03-15 DIAGNOSIS — E03.9 ACQUIRED HYPOTHYROIDISM: ICD-10-CM

## 2021-03-16 RX ORDER — LEVOTHYROXINE SODIUM 0.03 MG/1
25 TABLET ORAL DAILY
Qty: 90 TABLET | Refills: 0 | Status: SHIPPED | OUTPATIENT
Start: 2021-03-16 | End: 2021-04-03

## 2021-04-02 DIAGNOSIS — E03.9 ACQUIRED HYPOTHYROIDISM: ICD-10-CM

## 2021-04-03 RX ORDER — LEVOTHYROXINE SODIUM 0.03 MG/1
TABLET ORAL
Qty: 90 TABLET | Refills: 0 | Status: SHIPPED | OUTPATIENT
Start: 2021-04-03 | End: 2021-06-14

## 2021-04-28 ENCOUNTER — TELEPHONE (OUTPATIENT)
Dept: FAMILY MEDICINE CLINIC | Facility: MEDICAL CENTER | Age: 46
End: 2021-04-28

## 2021-04-28 DIAGNOSIS — Z20.822 EXPOSURE TO COVID-19 VIRUS: ICD-10-CM

## 2021-04-28 DIAGNOSIS — Z20.822 EXPOSURE TO COVID-19 VIRUS: Primary | ICD-10-CM

## 2021-04-28 PROCEDURE — U0003 INFECTIOUS AGENT DETECTION BY NUCLEIC ACID (DNA OR RNA); SEVERE ACUTE RESPIRATORY SYNDROME CORONAVIRUS 2 (SARS-COV-2) (CORONAVIRUS DISEASE [COVID-19]), AMPLIFIED PROBE TECHNIQUE, MAKING USE OF HIGH THROUGHPUT TECHNOLOGIES AS DESCRIBED BY CMS-2020-01-R: HCPCS | Performed by: FAMILY MEDICINE

## 2021-04-28 PROCEDURE — U0005 INFEC AGEN DETEC AMPLI PROBE: HCPCS | Performed by: FAMILY MEDICINE

## 2021-04-29 ENCOUNTER — TELEPHONE (OUTPATIENT)
Dept: FAMILY MEDICINE CLINIC | Facility: MEDICAL CENTER | Age: 46
End: 2021-04-29

## 2021-04-29 LAB — SARS-COV-2 RNA RESP QL NAA+PROBE: NEGATIVE

## 2021-05-22 DIAGNOSIS — E03.9 ACQUIRED HYPOTHYROIDISM: ICD-10-CM

## 2021-05-22 RX ORDER — LEVOTHYROXINE SODIUM 0.2 MG/1
200 TABLET ORAL DAILY
Qty: 90 TABLET | Refills: 0 | Status: SHIPPED | OUTPATIENT
Start: 2021-05-22 | End: 2021-08-20

## 2021-06-14 DIAGNOSIS — E03.9 ACQUIRED HYPOTHYROIDISM: ICD-10-CM

## 2021-06-14 RX ORDER — LEVOTHYROXINE SODIUM 0.03 MG/1
TABLET ORAL
Qty: 90 TABLET | Refills: 0 | Status: SHIPPED | OUTPATIENT
Start: 2021-06-14 | End: 2021-08-27 | Stop reason: SDUPTHER

## 2021-06-29 ENCOUNTER — TELEPHONE (OUTPATIENT)
Dept: FAMILY MEDICINE CLINIC | Facility: MEDICAL CENTER | Age: 46
End: 2021-06-29

## 2021-06-29 NOTE — TELEPHONE ENCOUNTER
Pt's wife called to say that the pt has a discharge from his belly button  No odor, yellowish color  This has been 3 days at the most but gotten worse today  Only washed it well  No injury  She said he has a history of hernia  Please, triage  Call wife as pt is at work

## 2021-06-30 NOTE — TELEPHONE ENCOUNTER
Spoke with Carlitos Nunes, she said that his belly button is red and tender, has a yellowish discharge  oing on 4 days now  Advised that this is likely because of the heat/sweating  Can cause yeast, advised trying lotramin cream otc  Stressed importance of keeping the area clean and dry, will keep an eye on it and watch for fever or redness spreading  Will let us know how he is in a few days

## 2021-07-01 NOTE — TELEPHONE ENCOUNTER
S/w Cheryle Barcelona  Its about thew same   At work , usually til 7:30 but can get out by 2:30 if needed   Please advise on where to schedule him

## 2021-07-01 NOTE — TELEPHONE ENCOUNTER
Wife called  He is unable to make this time  States they were not expecting to need an appointment  They will continue to follow previous instructions and call if its worse tonight and would like a phone call tomorrow but unsure if Luis Soto can be available by phone

## 2021-07-01 NOTE — TELEPHONE ENCOUNTER
LMOM for pt and separate message for wife to make them aware Dr Ivana Ayala will see pt lay at 8:15    Asked them to please call to confirm they received our message and that he can make the appt

## 2021-08-18 DIAGNOSIS — E03.9 ACQUIRED HYPOTHYROIDISM: ICD-10-CM

## 2021-08-20 RX ORDER — LEVOTHYROXINE SODIUM 0.2 MG/1
200 TABLET ORAL DAILY
Qty: 90 TABLET | Refills: 0 | Status: SHIPPED | OUTPATIENT
Start: 2021-08-20 | End: 2021-11-22

## 2021-08-21 NOTE — TELEPHONE ENCOUNTER
I refilled his med but he is due for blood work and appointment    Need CBC CMP lipid profile TSH T4

## 2021-08-26 DIAGNOSIS — E03.9 ACQUIRED HYPOTHYROIDISM: ICD-10-CM

## 2021-08-27 DIAGNOSIS — E03.9 ACQUIRED HYPOTHYROIDISM: ICD-10-CM

## 2021-08-27 RX ORDER — LEVOTHYROXINE SODIUM 0.03 MG/1
25 TABLET ORAL DAILY
Qty: 90 TABLET | Refills: 0 | Status: SHIPPED | OUTPATIENT
Start: 2021-08-27 | End: 2021-11-24

## 2021-08-27 RX ORDER — LEVOTHYROXINE SODIUM 0.03 MG/1
25 TABLET ORAL DAILY
Qty: 14 TABLET | Refills: 0 | Status: SHIPPED | OUTPATIENT
Start: 2021-08-27 | End: 2021-10-20

## 2021-08-27 NOTE — TELEPHONE ENCOUNTER
Pt's wife called to ask if pt's medication had been sent in yet  She was upset that it had not been sent to the pharmacy because pt needs to take his thyroid medication  She would like his medication sent in today and she would like to speak with Dr Ofelia Beal about why it is so difficult to have pt's refills sent in  Her number is [phone]    Routed to Clinical - Please bring this to Dr Silviano Ceron attention if she checks in today    Thanks

## 2021-08-27 NOTE — TELEPHONE ENCOUNTER
Hold for patient's appointment  We can send this once we see the appt scheduled  I will send enough to last until that appt

## 2021-10-12 ENCOUNTER — TELEPHONE (OUTPATIENT)
Dept: FAMILY MEDICINE CLINIC | Facility: MEDICAL CENTER | Age: 46
End: 2021-10-12

## 2021-10-18 ENCOUNTER — TELEPHONE (OUTPATIENT)
Dept: FAMILY MEDICINE CLINIC | Facility: MEDICAL CENTER | Age: 46
End: 2021-10-18

## 2021-10-20 RX ORDER — MULTIVIT-MIN/IRON FUM/FOLIC AC 7.5 MG-4
1 TABLET ORAL DAILY
COMMUNITY

## 2021-10-20 RX ORDER — OMEGA-3S/DHA/EPA/FISH OIL/D3 300MG-1000
1000 CAPSULE ORAL DAILY
COMMUNITY

## 2021-10-25 ENCOUNTER — OFFICE VISIT (OUTPATIENT)
Dept: FAMILY MEDICINE CLINIC | Facility: MEDICAL CENTER | Age: 46
End: 2021-10-25
Payer: COMMERCIAL

## 2021-10-25 VITALS
TEMPERATURE: 98.2 F | WEIGHT: 314 LBS | BODY MASS INDEX: 39.04 KG/M2 | DIASTOLIC BLOOD PRESSURE: 84 MMHG | HEART RATE: 80 BPM | SYSTOLIC BLOOD PRESSURE: 124 MMHG | HEIGHT: 75 IN

## 2021-10-25 DIAGNOSIS — R42 DIZZINESS: Primary | ICD-10-CM

## 2021-10-25 LAB — SL AMB POCT HEMOGLOBIN AIC: 5.8 (ref ?–6.5)

## 2021-10-25 PROCEDURE — 83036 HEMOGLOBIN GLYCOSYLATED A1C: CPT | Performed by: FAMILY MEDICINE

## 2021-10-25 PROCEDURE — 99214 OFFICE O/P EST MOD 30 MIN: CPT | Performed by: FAMILY MEDICINE

## 2021-10-25 PROCEDURE — 3725F SCREEN DEPRESSION PERFORMED: CPT | Performed by: FAMILY MEDICINE

## 2021-10-25 PROCEDURE — 1036F TOBACCO NON-USER: CPT | Performed by: FAMILY MEDICINE

## 2021-10-25 PROCEDURE — 3008F BODY MASS INDEX DOCD: CPT | Performed by: FAMILY MEDICINE

## 2021-10-26 ENCOUNTER — TELEPHONE (OUTPATIENT)
Dept: FAMILY MEDICINE CLINIC | Facility: MEDICAL CENTER | Age: 46
End: 2021-10-26

## 2021-10-26 ENCOUNTER — APPOINTMENT (OUTPATIENT)
Dept: LAB | Facility: CLINIC | Age: 46
End: 2021-10-26
Payer: COMMERCIAL

## 2021-10-26 DIAGNOSIS — E03.9 ACQUIRED HYPOTHYROIDISM: Primary | ICD-10-CM

## 2021-10-26 DIAGNOSIS — E78.5 HYPERLIPIDEMIA, UNSPECIFIED HYPERLIPIDEMIA TYPE: ICD-10-CM

## 2021-10-26 DIAGNOSIS — M47.819 SPONDYLOARTHRITIS: ICD-10-CM

## 2021-10-26 DIAGNOSIS — E03.9 ACQUIRED HYPOTHYROIDISM: ICD-10-CM

## 2021-10-26 LAB
ALBUMIN SERPL BCP-MCNC: 3.7 G/DL (ref 3.5–5)
ALP SERPL-CCNC: 81 U/L (ref 46–116)
ALT SERPL W P-5'-P-CCNC: 67 U/L (ref 12–78)
ANION GAP SERPL CALCULATED.3IONS-SCNC: 4 MMOL/L (ref 4–13)
AST SERPL W P-5'-P-CCNC: 31 U/L (ref 5–45)
BASOPHILS # BLD AUTO: 0.04 THOUSANDS/ΜL (ref 0–0.1)
BASOPHILS NFR BLD AUTO: 1 % (ref 0–1)
BILIRUB SERPL-MCNC: 0.38 MG/DL (ref 0.2–1)
BUN SERPL-MCNC: 22 MG/DL (ref 5–25)
CALCIUM SERPL-MCNC: 9.4 MG/DL (ref 8.3–10.1)
CHLORIDE SERPL-SCNC: 107 MMOL/L (ref 100–108)
CHOLEST SERPL-MCNC: 175 MG/DL (ref 50–200)
CO2 SERPL-SCNC: 28 MMOL/L (ref 21–32)
CREAT SERPL-MCNC: 1.18 MG/DL (ref 0.6–1.3)
CRP SERPL QL: 8 MG/L
EOSINOPHIL # BLD AUTO: 0.15 THOUSAND/ΜL (ref 0–0.61)
EOSINOPHIL NFR BLD AUTO: 2 % (ref 0–6)
ERYTHROCYTE [DISTWIDTH] IN BLOOD BY AUTOMATED COUNT: 13.5 % (ref 11.6–15.1)
ERYTHROCYTE [SEDIMENTATION RATE] IN BLOOD: 25 MM/HOUR (ref 0–14)
GFR SERPL CREATININE-BSD FRML MDRD: 74 ML/MIN/1.73SQ M
GLUCOSE P FAST SERPL-MCNC: 103 MG/DL (ref 65–99)
HCT VFR BLD AUTO: 42.6 % (ref 36.5–49.3)
HDLC SERPL-MCNC: 42 MG/DL
HGB BLD-MCNC: 13.9 G/DL (ref 12–17)
IMM GRANULOCYTES # BLD AUTO: 0.03 THOUSAND/UL (ref 0–0.2)
IMM GRANULOCYTES NFR BLD AUTO: 0 % (ref 0–2)
LDLC SERPL CALC-MCNC: 101 MG/DL (ref 0–100)
LYMPHOCYTES # BLD AUTO: 2.49 THOUSANDS/ΜL (ref 0.6–4.47)
LYMPHOCYTES NFR BLD AUTO: 37 % (ref 14–44)
MCH RBC QN AUTO: 27.9 PG (ref 26.8–34.3)
MCHC RBC AUTO-ENTMCNC: 32.6 G/DL (ref 31.4–37.4)
MCV RBC AUTO: 86 FL (ref 82–98)
MONOCYTES # BLD AUTO: 0.69 THOUSAND/ΜL (ref 0.17–1.22)
MONOCYTES NFR BLD AUTO: 10 % (ref 4–12)
NEUTROPHILS # BLD AUTO: 3.4 THOUSANDS/ΜL (ref 1.85–7.62)
NEUTS SEG NFR BLD AUTO: 50 % (ref 43–75)
NONHDLC SERPL-MCNC: 133 MG/DL
NRBC BLD AUTO-RTO: 0 /100 WBCS
PLATELET # BLD AUTO: 236 THOUSANDS/UL (ref 149–390)
PMV BLD AUTO: 10 FL (ref 8.9–12.7)
POTASSIUM SERPL-SCNC: 4.5 MMOL/L (ref 3.5–5.3)
PROT SERPL-MCNC: 7.6 G/DL (ref 6.4–8.2)
RBC # BLD AUTO: 4.98 MILLION/UL (ref 3.88–5.62)
SODIUM SERPL-SCNC: 139 MMOL/L (ref 136–145)
TRIGL SERPL-MCNC: 161 MG/DL
TSH SERPL DL<=0.05 MIU/L-ACNC: 2.91 UIU/ML (ref 0.36–3.74)
URATE SERPL-MCNC: 9.1 MG/DL (ref 4.2–8)
WBC # BLD AUTO: 6.8 THOUSAND/UL (ref 4.31–10.16)

## 2021-10-26 PROCEDURE — 81374 HLA I TYPING 1 ANTIGEN LR: CPT

## 2021-10-26 PROCEDURE — 80053 COMPREHEN METABOLIC PANEL: CPT

## 2021-10-26 PROCEDURE — 86140 C-REACTIVE PROTEIN: CPT

## 2021-10-26 PROCEDURE — 80061 LIPID PANEL: CPT

## 2021-10-26 PROCEDURE — 85652 RBC SED RATE AUTOMATED: CPT

## 2021-10-26 PROCEDURE — 84443 ASSAY THYROID STIM HORMONE: CPT

## 2021-10-26 PROCEDURE — 36415 COLL VENOUS BLD VENIPUNCTURE: CPT

## 2021-10-26 PROCEDURE — 85025 COMPLETE CBC W/AUTO DIFF WBC: CPT

## 2021-10-26 PROCEDURE — 84550 ASSAY OF BLOOD/URIC ACID: CPT

## 2021-11-01 ENCOUNTER — TELEPHONE (OUTPATIENT)
Dept: FAMILY MEDICINE CLINIC | Facility: MEDICAL CENTER | Age: 46
End: 2021-11-01

## 2021-11-03 LAB — HLA-B27 QL NAA+PROBE: NEGATIVE

## 2021-11-15 ENCOUNTER — ANESTHESIA EVENT (OUTPATIENT)
Dept: MRI IMAGING | Facility: HOSPITAL | Age: 46
End: 2021-11-15

## 2021-11-15 ENCOUNTER — NURSE TRIAGE (OUTPATIENT)
Dept: OTHER | Facility: OTHER | Age: 46
End: 2021-11-15

## 2021-11-15 RX ORDER — DIPHENHYDRAMINE HYDROCHLORIDE 50 MG/ML
12.5 INJECTION INTRAMUSCULAR; INTRAVENOUS ONCE AS NEEDED
Status: CANCELLED | OUTPATIENT
Start: 2021-11-15

## 2021-11-15 RX ORDER — ONDANSETRON 2 MG/ML
4 INJECTION INTRAMUSCULAR; INTRAVENOUS ONCE AS NEEDED
Status: CANCELLED | OUTPATIENT
Start: 2021-11-15

## 2021-11-15 RX ORDER — SODIUM CHLORIDE, SODIUM LACTATE, POTASSIUM CHLORIDE, CALCIUM CHLORIDE 600; 310; 30; 20 MG/100ML; MG/100ML; MG/100ML; MG/100ML
125 INJECTION, SOLUTION INTRAVENOUS CONTINUOUS
Status: CANCELLED | OUTPATIENT
Start: 2021-11-15

## 2021-11-15 RX ORDER — LIDOCAINE HYDROCHLORIDE 10 MG/ML
0.5 INJECTION, SOLUTION EPIDURAL; INFILTRATION; INTRACAUDAL; PERINEURAL ONCE AS NEEDED
Status: CANCELLED | OUTPATIENT
Start: 2021-11-15

## 2021-11-15 RX ORDER — METOCLOPRAMIDE HYDROCHLORIDE 5 MG/ML
10 INJECTION INTRAMUSCULAR; INTRAVENOUS ONCE AS NEEDED
Status: CANCELLED | OUTPATIENT
Start: 2021-11-15

## 2021-11-16 ENCOUNTER — ANESTHESIA (OUTPATIENT)
Dept: MRI IMAGING | Facility: HOSPITAL | Age: 46
End: 2021-11-16

## 2021-11-16 ENCOUNTER — HOSPITAL ENCOUNTER (OUTPATIENT)
Dept: MRI IMAGING | Facility: HOSPITAL | Age: 46
Discharge: HOME | End: 2021-11-16

## 2021-11-16 NOTE — ANESTHESIA PREPROCEDURE EVALUATION
Procedure:  MRI LUMBAR SPINE W WO CONTRAST    Relevant Problems   ANESTHESIA (within normal limits)      CARDIO   (+) Hyperlipidemia   (+) Hypertension   (+) Venous insufficiency of both lower extremities      ENDO   (+) Hypothyroidism      GI/HEPATIC (within normal limits)      /RENAL (within normal limits)      HEMATOLOGY (within normal limits)      MUSCULOSKELETAL   (+) Chronic back pain      NEURO/PSYCH   (+) Chronic back pain   (+) Chronic pain disorder      PULMONARY (within normal limits)      Other   (+) Lymphedema of right lower extremity   (+) Swelling of lower extremity      Lab Results   Component Value Date    WBC 6 80 10/26/2021    HGB 13 9 10/26/2021    HCT 42 6 10/26/2021    MCV 86 10/26/2021     10/26/2021     Lab Results   Component Value Date    SODIUM 139 10/26/2021    K 4 5 10/26/2021     10/26/2021    CO2 28 10/26/2021    BUN 22 10/26/2021    CREATININE 1 18 10/26/2021    GLUC 93 03/02/2020    CALCIUM 9 4 10/26/2021     No results found for: INR, PROTIME  Lab Results   Component Value Date    HGBA1C 5 8 10/25/2021            Physical Exam    Airway    Mallampati score: II  TM Distance: >3 FB  Neck ROM: full     Dental   No notable dental hx     Cardiovascular  Rhythm: regular, Rate: normal, Cardiovascular exam normal    Pulmonary  Pulmonary exam normal Breath sounds clear to auscultation,     Other Findings        Anesthesia Plan  ASA Score- 3     Anesthesia Type- general with ASA Monitors  Additional Monitors:   Airway Plan: LMA  Plan Factors-    Chart reviewed  EKG reviewed  Existing labs reviewed  Patient summary reviewed  Induction- intravenous  Postoperative Plan-     Informed Consent-   I personally reviewed this patient with the CRNA  Discussed and agreed on the Anesthesia Plan with the CRNA  Lynne Kraus

## 2021-11-22 DIAGNOSIS — E03.9 ACQUIRED HYPOTHYROIDISM: ICD-10-CM

## 2021-11-22 RX ORDER — LEVOTHYROXINE SODIUM 0.2 MG/1
200 TABLET ORAL DAILY
Qty: 90 TABLET | Refills: 0 | Status: SHIPPED | OUTPATIENT
Start: 2021-11-22 | End: 2022-02-21

## 2021-11-30 VITALS — BODY MASS INDEX: 39.04 KG/M2 | HEIGHT: 75 IN | WEIGHT: 314 LBS

## 2021-12-02 ENCOUNTER — OFFICE VISIT (OUTPATIENT)
Dept: FAMILY MEDICINE CLINIC | Facility: MEDICAL CENTER | Age: 46
End: 2021-12-02
Payer: COMMERCIAL

## 2021-12-02 VITALS
BODY MASS INDEX: 39.04 KG/M2 | HEIGHT: 75 IN | HEART RATE: 89 BPM | SYSTOLIC BLOOD PRESSURE: 120 MMHG | TEMPERATURE: 98.1 F | DIASTOLIC BLOOD PRESSURE: 84 MMHG | OXYGEN SATURATION: 98 % | WEIGHT: 314 LBS

## 2021-12-02 DIAGNOSIS — I89.0 LYMPHEDEMA OF RIGHT LOWER EXTREMITY: ICD-10-CM

## 2021-12-02 DIAGNOSIS — Z12.11 COLON CANCER SCREENING: ICD-10-CM

## 2021-12-02 DIAGNOSIS — M10.9 GOUT, UNSPECIFIED CAUSE, UNSPECIFIED CHRONICITY, UNSPECIFIED SITE: Primary | ICD-10-CM

## 2021-12-02 DIAGNOSIS — Z00.00 ROUTINE ADULT HEALTH MAINTENANCE: ICD-10-CM

## 2021-12-02 PROCEDURE — 1036F TOBACCO NON-USER: CPT | Performed by: FAMILY MEDICINE

## 2021-12-02 PROCEDURE — 3008F BODY MASS INDEX DOCD: CPT | Performed by: FAMILY MEDICINE

## 2021-12-02 PROCEDURE — 99396 PREV VISIT EST AGE 40-64: CPT | Performed by: FAMILY MEDICINE

## 2021-12-02 RX ORDER — ALLOPURINOL 100 MG/1
100 TABLET ORAL DAILY
Qty: 30 TABLET | Refills: 5 | Status: SHIPPED | OUTPATIENT
Start: 2021-12-02 | End: 2022-05-20

## 2021-12-08 ENCOUNTER — HOSPITAL ENCOUNTER (OUTPATIENT)
Dept: RADIOLOGY | Facility: HOSPITAL | Age: 46
Discharge: HOME | End: 2021-12-08

## 2021-12-28 ENCOUNTER — TELEPHONE (OUTPATIENT)
Dept: FAMILY MEDICINE CLINIC | Facility: MEDICAL CENTER | Age: 46
End: 2021-12-28

## 2021-12-28 DIAGNOSIS — R05.9 COUGH: Primary | ICD-10-CM

## 2021-12-28 DIAGNOSIS — R09.81 HEAD CONGESTION: ICD-10-CM

## 2022-01-03 PROCEDURE — U0003 INFECTIOUS AGENT DETECTION BY NUCLEIC ACID (DNA OR RNA); SEVERE ACUTE RESPIRATORY SYNDROME CORONAVIRUS 2 (SARS-COV-2) (CORONAVIRUS DISEASE [COVID-19]), AMPLIFIED PROBE TECHNIQUE, MAKING USE OF HIGH THROUGHPUT TECHNOLOGIES AS DESCRIBED BY CMS-2020-01-R: HCPCS | Performed by: FAMILY MEDICINE

## 2022-01-03 PROCEDURE — U0005 INFEC AGEN DETEC AMPLI PROBE: HCPCS | Performed by: FAMILY MEDICINE

## 2022-01-28 ENCOUNTER — HOSPITAL ENCOUNTER (OUTPATIENT)
Dept: MRI IMAGING | Facility: HOSPITAL | Age: 47
Discharge: HOME | End: 2022-01-28

## 2022-02-19 DIAGNOSIS — E03.9 ACQUIRED HYPOTHYROIDISM: ICD-10-CM

## 2022-02-21 RX ORDER — LEVOTHYROXINE SODIUM 0.2 MG/1
200 TABLET ORAL DAILY
Qty: 90 TABLET | Refills: 0 | Status: SHIPPED | OUTPATIENT
Start: 2022-02-21 | End: 2022-05-18

## 2022-04-25 ENCOUNTER — OFFICE VISIT (OUTPATIENT)
Dept: FAMILY MEDICINE CLINIC | Facility: MEDICAL CENTER | Age: 47
End: 2022-04-25
Payer: COMMERCIAL

## 2022-04-25 VITALS
HEART RATE: 72 BPM | HEIGHT: 75 IN | TEMPERATURE: 98.2 F | SYSTOLIC BLOOD PRESSURE: 138 MMHG | DIASTOLIC BLOOD PRESSURE: 82 MMHG | WEIGHT: 315 LBS | BODY MASS INDEX: 39.17 KG/M2

## 2022-04-25 DIAGNOSIS — Z23 ENCOUNTER FOR IMMUNIZATION: ICD-10-CM

## 2022-04-25 DIAGNOSIS — Z87.39 HISTORY OF GOUT: ICD-10-CM

## 2022-04-25 DIAGNOSIS — E03.9 ACQUIRED HYPOTHYROIDISM: ICD-10-CM

## 2022-04-25 DIAGNOSIS — H61.21 IMPACTED CERUMEN OF RIGHT EAR: ICD-10-CM

## 2022-04-25 DIAGNOSIS — Z09 FOLLOW-UP EXAM, 3-6 MONTHS SINCE PREVIOUS EXAM: Primary | ICD-10-CM

## 2022-04-25 DIAGNOSIS — G89.4 CHRONIC PAIN DISORDER: ICD-10-CM

## 2022-04-25 PROCEDURE — 69210 REMOVE IMPACTED EAR WAX UNI: CPT | Performed by: STUDENT IN AN ORGANIZED HEALTH CARE EDUCATION/TRAINING PROGRAM

## 2022-04-25 PROCEDURE — 3008F BODY MASS INDEX DOCD: CPT | Performed by: STUDENT IN AN ORGANIZED HEALTH CARE EDUCATION/TRAINING PROGRAM

## 2022-04-25 PROCEDURE — 90715 TDAP VACCINE 7 YRS/> IM: CPT | Performed by: STUDENT IN AN ORGANIZED HEALTH CARE EDUCATION/TRAINING PROGRAM

## 2022-04-25 PROCEDURE — 1036F TOBACCO NON-USER: CPT | Performed by: STUDENT IN AN ORGANIZED HEALTH CARE EDUCATION/TRAINING PROGRAM

## 2022-04-25 PROCEDURE — 3725F SCREEN DEPRESSION PERFORMED: CPT | Performed by: STUDENT IN AN ORGANIZED HEALTH CARE EDUCATION/TRAINING PROGRAM

## 2022-04-25 PROCEDURE — 99214 OFFICE O/P EST MOD 30 MIN: CPT | Performed by: STUDENT IN AN ORGANIZED HEALTH CARE EDUCATION/TRAINING PROGRAM

## 2022-04-25 PROCEDURE — 90471 IMMUNIZATION ADMIN: CPT | Performed by: STUDENT IN AN ORGANIZED HEALTH CARE EDUCATION/TRAINING PROGRAM

## 2022-04-25 NOTE — PROGRESS NOTES
[address] [facility] - Clinic Note  Chaz Jacobsen, [place], 22     Bel Corado MRN: [id number] : 1975 Age: 55 y o  Assessment/Plan     1  Follow-up exam, 3-6 months since previous exam    - patient does have a standing order for referral for colonoscopy, patient states he is aware of this  - did discuss immunizations today in office, discussed benefit of COVID-19 vaccination, patient states he certainly was thinking about it but has not yet proceeded with it   - counseled about tetanus vaccine, agreeable to complete today in office  2  Encounter for immunization    - counseled about benefit of tetanus vaccine   - TDAP VACCINE GREATER THAN OR EQUAL TO 6YO IM    3  Acquired hypothyroidism    - stable on current regimen of levothyroxine 225 microgram p o  q a m     4  Chronic pain disorder    - managed by Pain Management     5  History of gout    - taking allopurinol 100 milligram p o  daily    6  Impacted cerumen of right ear    - patient did have acute complaint today in office of nasal congestion, sore throat, bilateral ear fullness  Patient states he does not have known history of seasonal allergies  No fever, vital signs stable  COVID-19 at home test negative  Supportive care at this time  Upon ENT, exam today, right ear cerumen impaction, flush completed today in office  Bel Corado acknowledged understanding of treatment plan, all questions answered  Jw Delatorre is a 55 y o  male who presents for 3 month follow up  He has past medical history including chronic pain disorder following with Pain Management, hypothyroidism, history of gout flare in past (uric acid level 10/26/21 9 1) on allopurinol  Patient does have acute complaint today of sore throat, nasal congestion, bilateral ear fullness  Denies any fever, chills, diarrhea  States window was open when sleeping  No history of seasonal allergies   Not vaccinated against COVID-19, tested negative with at home test this morning       The following portions of the patient's history were reviewed and updated as appropriate: allergies, current medications, past family history, past medical history, past social history, past surgical history and problem list      Past Medical History:   Diagnosis Date    Chronic pain disorder     Disease of thyroid gland     Nausea & vomiting 1/2/2020       No Known Allergies    Past Surgical History:   Procedure Laterality Date    APPENDECTOMY      RESOLVED: 2000    CHOLECYSTECTOMY      RESOLVED: 2004    CHOLECYSTECTOMY      LUMBAR LAMINECTOMY      UMBILICAL HERNIA REPAIR      RESOLVED: 3290    UMBILICAL HERNIA REPAIR         Family History   Problem Relation Age of Onset    Cirrhosis Mother         HEPATIC    Cirrhosis Father         HEPATIC       Social History     Socioeconomic History    Marital status: /Civil Union     Spouse name: None    Number of children: None    Years of education: None    Highest education level: None   Occupational History    None   Tobacco Use    Smoking status: Former Smoker    Smokeless tobacco: Never Used   Vaping Use    Vaping Use: Never used   Substance and Sexual Activity    Alcohol use: No    Drug use: No    Sexual activity: Yes     Partners: Female   Other Topics Concern    None   Social History Narrative    None     Social Determinants of Health     Financial Resource Strain: Not on file   Food Insecurity: Not on file   Transportation Needs: Not on file   Physical Activity: Not on file   Stress: Not on file   Social Connections: Not on file   Intimate Partner Violence: Not on file   Housing Stability: Not on file       Current Outpatient Medications   Medication Sig Dispense Refill    allopurinol (ZYLOPRIM) 100 mg tablet Take 1 tablet (100 mg total) by mouth daily 30 tablet 5    cholecalciferol (VITAMIN D3) 400 units tablet Take 1,000 Units by mouth daily      ibuprofen (MOTRIN) 800 mg tablet Take 800 mg by mouth every 6 (six) hours as needed for mild pain      levothyroxine 200 mcg tablet TAKE 1 TABLET (200 MCG TOTAL) BY MOUTH DAILY AS DIRECTED 90 tablet 0    levothyroxine 25 mcg tablet TAKE 1 TABLET BY MOUTH EVERY DAY 90 tablet 1    Multiple Vitamins-Minerals (multivitamin with minerals) tablet Take 1 tablet by mouth daily      oxyCODONE (ROXICODONE) 15 mg immediate release tablet       XTAMPZA ER 36 MG C12A       sildenafil (REVATIO) 20 mg tablet Take 1 tablet (20 mg total) by mouth daily as needed (Erectile dysfunction) (Patient not taking: Reported on 4/25/2022 ) 30 tablet 6     No current facility-administered medications for this visit  Review of Systems     As noted in HPI    Objective      /82 (BP Location: Left arm, Patient Position: Sitting, Cuff Size: Large)   Pulse 72   Temp 98 2 °F (36 8 °C)   Ht 6' 3" (1 905 m)   Wt (!) 144 kg (317 lb)   BMI 39 62 kg/m²     Physical Exam  Vitals reviewed  Constitutional:       General: He is not in acute distress  Appearance: Normal appearance  He is obese  He is not ill-appearing or toxic-appearing  HENT:      Head: Normocephalic and atraumatic  Right Ear: There is impacted cerumen  Left Ear: Tympanic membrane, ear canal and external ear normal       Nose: Congestion present  No rhinorrhea  Mouth/Throat:      Mouth: Mucous membranes are moist       Pharynx: Oropharynx is clear  No oropharyngeal exudate  Eyes:      Extraocular Movements: Extraocular movements intact  Conjunctiva/sclera: Conjunctivae normal       Pupils: Pupils are equal, round, and reactive to light  Cardiovascular:      Rate and Rhythm: Normal rate and regular rhythm  Heart sounds: Normal heart sounds  Pulmonary:      Effort: Pulmonary effort is normal       Breath sounds: Normal breath sounds  Abdominal:      General: Bowel sounds are normal       Palpations: Abdomen is soft  Tenderness: There is no abdominal tenderness     Lymphadenopathy: Cervical: No cervical adenopathy  Skin:     General: Skin is warm and dry  Neurological:      Mental Status: He is oriented to person, place, and time  Psychiatric:         Behavior: Behavior normal          Thought Content: Thought content normal              Some portions of this record may have been generated with voice recognition software  There may be translation, syntax, or grammatical errors  Occasional wrong word or "sound-a-like" substitutions may have occurred due to the inherent limitations of the voice recognition software  Read the chart carefully and recognize, using context, where substations may have occurred  If you have any questions, please contact the dictating provider for clarification or correction, as needed

## 2022-05-18 DIAGNOSIS — E03.9 ACQUIRED HYPOTHYROIDISM: ICD-10-CM

## 2022-05-18 RX ORDER — LEVOTHYROXINE SODIUM 0.03 MG/1
TABLET ORAL
Qty: 90 TABLET | Refills: 1 | Status: SHIPPED | OUTPATIENT
Start: 2022-05-18

## 2022-05-18 RX ORDER — LEVOTHYROXINE SODIUM 0.2 MG/1
TABLET ORAL
Qty: 90 TABLET | Refills: 0 | Status: SHIPPED | OUTPATIENT
Start: 2022-05-18

## 2022-05-20 DIAGNOSIS — M10.9 GOUT, UNSPECIFIED CAUSE, UNSPECIFIED CHRONICITY, UNSPECIFIED SITE: ICD-10-CM

## 2022-05-20 RX ORDER — ALLOPURINOL 100 MG/1
TABLET ORAL
Qty: 90 TABLET | Refills: 1 | Status: SHIPPED | OUTPATIENT
Start: 2022-05-20

## 2022-05-23 DIAGNOSIS — M47.819 SPONDYLOARTHRITIS: Primary | ICD-10-CM

## 2022-05-23 RX ORDER — SULFASALAZINE 500 MG/1
TABLET ORAL
Qty: 180 TABLET | Refills: 1 | Status: SHIPPED | OUTPATIENT
Start: 2022-05-23 | End: 2022-06-14 | Stop reason: ALTCHOICE

## 2022-06-02 VITALS — WEIGHT: 295 LBS | BODY MASS INDEX: 36.68 KG/M2 | HEIGHT: 75 IN

## 2022-06-02 NOTE — PRE-PROCEDURE INSTRUCTIONS
Pre-Surgery Instructions:   Medication Instructions    allopurinol (ZYLOPRIM) 100 mg tablet Take day of surgery   cholecalciferol (VITAMIN D3) 400 units tablet Hold day of surgery   ibuprofen (MOTRIN) 800 mg tablet Hold day of surgery   levothyroxine 200 mcg tablet Take day of surgery   levothyroxine 25 mcg tablet Take day of surgery   Multiple Vitamins-Minerals (multivitamin with minerals) tablet Hold day of surgery   oxyCODONE (ROXICODONE) 15 mg immediate release tablet Take day of surgery   sulfaSALAzine (AZULFIDINE) 500 mg tablet Take day of surgery   XTAMPZA ER 36 MG C12A Take day of surgery

## 2022-06-09 ENCOUNTER — TELEPHONE (OUTPATIENT)
Dept: FAMILY MEDICINE CLINIC | Facility: MEDICAL CENTER | Age: 47
End: 2022-06-09

## 2022-06-09 NOTE — TELEPHONE ENCOUNTER
FYI-if pt calls back:    Gregorysterling Romero from [facility] [phone], re: pt has an upcoming MRI w/anesthesia on 6/17/22, and he needs a clearance for it  Pt was informed on 5/31 that he needed the clearance and again when they went to do his PAT questions  Gregory Romero said if H&P not completed the MRI will have to be rescheduled  I left a voice mail for the pt to call back to schedule w/Dr Maryse Romero said she will sporadically check to see if pt has appt sched  Gregory Romero said if we have any issues to just Teams msg her, her last name is [address] [place] [address], [place]

## 2022-06-09 NOTE — TELEPHONE ENCOUNTER
Pt's wife called back, pt scheduled for tomorrow 6/10 at 2:30  I sent Eliana Loud a Teams msg, and also asked if any bw due?

## 2022-06-10 NOTE — TELEPHONE ENCOUNTER
Spoke with Meka CATES since Clif Garcia is off today so her Teams is not accessible  No bloodwork ordered  Dr Taya Morley can just do full physical for his pre-op clearance for anesthesia and PAT will put it out of pt's chart, probably on Monday      Routed to Dr Taya Morley - Beatriz Schwartz

## 2022-06-13 ENCOUNTER — ANESTHESIA (OUTPATIENT)
Dept: ANESTHESIOLOGY | Facility: HOSPITAL | Age: 47
End: 2022-06-13

## 2022-06-13 ENCOUNTER — ANESTHESIA EVENT (OUTPATIENT)
Dept: ANESTHESIOLOGY | Facility: HOSPITAL | Age: 47
End: 2022-06-13

## 2022-06-14 ENCOUNTER — OFFICE VISIT (OUTPATIENT)
Dept: FAMILY MEDICINE CLINIC | Facility: MEDICAL CENTER | Age: 47
End: 2022-06-14
Payer: COMMERCIAL

## 2022-06-14 ENCOUNTER — APPOINTMENT (OUTPATIENT)
Dept: LAB | Facility: MEDICAL CENTER | Age: 47
End: 2022-06-14
Payer: COMMERCIAL

## 2022-06-14 VITALS
DIASTOLIC BLOOD PRESSURE: 78 MMHG | HEIGHT: 75 IN | WEIGHT: 304 LBS | SYSTOLIC BLOOD PRESSURE: 130 MMHG | BODY MASS INDEX: 37.8 KG/M2 | OXYGEN SATURATION: 96 % | HEART RATE: 78 BPM

## 2022-06-14 DIAGNOSIS — Z13.1 SCREENING FOR DIABETES MELLITUS (DM): ICD-10-CM

## 2022-06-14 DIAGNOSIS — G89.4 CHRONIC PAIN DISORDER: ICD-10-CM

## 2022-06-14 DIAGNOSIS — G89.29 CHRONIC BACK PAIN, UNSPECIFIED BACK LOCATION, UNSPECIFIED BACK PAIN LATERALITY: ICD-10-CM

## 2022-06-14 DIAGNOSIS — E03.9 ACQUIRED HYPOTHYROIDISM: ICD-10-CM

## 2022-06-14 DIAGNOSIS — Z01.818 PRE-OP EXAM: ICD-10-CM

## 2022-06-14 DIAGNOSIS — M54.9 CHRONIC BACK PAIN, UNSPECIFIED BACK LOCATION, UNSPECIFIED BACK PAIN LATERALITY: ICD-10-CM

## 2022-06-14 DIAGNOSIS — Z01.818 PRE-OP EXAM: Primary | ICD-10-CM

## 2022-06-14 DIAGNOSIS — I89.0 LYMPHEDEMA OF RIGHT LOWER EXTREMITY: ICD-10-CM

## 2022-06-14 LAB
ANION GAP SERPL CALCULATED.3IONS-SCNC: 3 MMOL/L (ref 4–13)
BUN SERPL-MCNC: 19 MG/DL (ref 5–25)
CALCIUM SERPL-MCNC: 9.4 MG/DL (ref 8.3–10.1)
CHLORIDE SERPL-SCNC: 106 MMOL/L (ref 100–108)
CO2 SERPL-SCNC: 30 MMOL/L (ref 21–32)
CREAT SERPL-MCNC: 1.18 MG/DL (ref 0.6–1.3)
EST. AVERAGE GLUCOSE BLD GHB EST-MCNC: 114 MG/DL
GFR SERPL CREATININE-BSD FRML MDRD: 73 ML/MIN/1.73SQ M
GLUCOSE P FAST SERPL-MCNC: 98 MG/DL (ref 65–99)
HBA1C MFR BLD: 5.6 %
POTASSIUM SERPL-SCNC: 4.3 MMOL/L (ref 3.5–5.3)
SODIUM SERPL-SCNC: 139 MMOL/L (ref 136–145)
TSH SERPL DL<=0.05 MIU/L-ACNC: 0.78 UIU/ML (ref 0.45–4.5)

## 2022-06-14 PROCEDURE — 3008F BODY MASS INDEX DOCD: CPT | Performed by: STUDENT IN AN ORGANIZED HEALTH CARE EDUCATION/TRAINING PROGRAM

## 2022-06-14 PROCEDURE — 99214 OFFICE O/P EST MOD 30 MIN: CPT | Performed by: STUDENT IN AN ORGANIZED HEALTH CARE EDUCATION/TRAINING PROGRAM

## 2022-06-14 PROCEDURE — 83036 HEMOGLOBIN GLYCOSYLATED A1C: CPT

## 2022-06-14 PROCEDURE — 84443 ASSAY THYROID STIM HORMONE: CPT

## 2022-06-14 PROCEDURE — 80048 BASIC METABOLIC PNL TOTAL CA: CPT

## 2022-06-14 PROCEDURE — 36415 COLL VENOUS BLD VENIPUNCTURE: CPT

## 2022-06-14 PROCEDURE — 1036F TOBACCO NON-USER: CPT | Performed by: STUDENT IN AN ORGANIZED HEALTH CARE EDUCATION/TRAINING PROGRAM

## 2022-06-14 NOTE — PROGRESS NOTES
PRE-OPERATIVE EVALUATION  [facility] [place]    NAME: Ton Valentine  AGE: 52 y o  SEX: male  : 1975     DATE: 2022     Pre-Operative Evaluation      Chief Complaint: Pre-operative Evaluation      Iamging:  MRI with IV sedation  Anticipated Date of MRI: 22  Referring Provider: Dr Edi Villalobos      History of Present Illness:     Ton Valentine is a 52 y o  male who presents to the office today for a preoperative consultation at the request of Dr Edi Villalobos  who plans on performing MRI with IV sedation on 22  Planned anesthesia is IV sedation  Patient has a bleeding risk of: no recent abnormal bleeding  Patient does not have objections to receiving blood products if needed  Current anti-platelet/anti-coagulation medications that the patient is prescribed includes: N/A  Assessment of Chronic Conditions:     - Hypothyroidism, stable    Assessment of Cardiac Risk:  · Denies unstable or severe angina or MI in the last 6 weeks or history of stent placement in the last year   · Denies decompensated heart failure (e g  New onset heart failure, NYHA functional class IV heart failure, or worsening existing heart failure)  · Denies significant arrhythmias such as high grade AV block, symptomatic ventricular arrhythmia, newly recognized ventricular tachycardia, supraventricular tachycardia with resting heart rate >100, or symptomatic bradycardia  · Denies severe heart valve disease including aortic stenosis or symptomatic mitral stenosis     Exercise Capacity:  · Able to walk 4 blocks without symptoms?: Yes  · Able to walk 2 flights without symptoms?: Yes    Prior Anesthesia Reactions: No     Personal history of venous thromboembolic disease? No    History of steroid use for >2 weeks within last year?  No    STOP-BANG Sleep Apnea Screening Questionnaire: 4 points, high risk MAC      Review of Systems:     Review of Systems   Constitutional: Negative for activity change, appetite change, chills, fatigue, fever and unexpected weight change  HENT: Negative for hearing loss and sore throat  Eyes: Negative for visual disturbance  Respiratory: Negative for shortness of breath  Cardiovascular: Positive for leg swelling (lymphedema )  Negative for chest pain and palpitations  Gastrointestinal: Negative for abdominal pain, blood in stool, constipation, diarrhea, nausea and vomiting  Endocrine: Negative for cold intolerance, heat intolerance, polydipsia, polyphagia and polyuria  Musculoskeletal: Positive for back pain  Neurological: Negative for dizziness, seizures, syncope, light-headedness and headaches  Psychiatric/Behavioral: Negative for dysphoric mood and sleep disturbance  The patient is not nervous/anxious        Current Problem List:     Patient Active Problem List   Diagnosis    Hyperlipidemia    Hypothyroidism    Cellulitis of right lower extremity    Chronic back pain    Hypertension    Lymphedema of right lower extremity    Swelling of lower extremity    Venous insufficiency of both lower extremities    Chronic pain disorder       Allergies:     No Known Allergies    Medications:       Current Outpatient Medications:     allopurinol (ZYLOPRIM) 100 mg tablet, TAKE 1 TABLET BY MOUTH EVERY DAY, Disp: 90 tablet, Rfl: 1    cholecalciferol (VITAMIN D3) 400 units tablet, Take 1,000 Units by mouth daily, Disp: , Rfl:     ibuprofen (MOTRIN) 800 mg tablet, Take 800 mg by mouth every 6 (six) hours as needed for mild pain, Disp: , Rfl:     levothyroxine 200 mcg tablet, TAKE 1 TABLET BY MOUTH DAILY AS DIRECTED, Disp: 90 tablet, Rfl: 0    levothyroxine 25 mcg tablet, TAKE 1 TABLET BY MOUTH EVERY DAY, Disp: 90 tablet, Rfl: 1    Multiple Vitamins-Minerals (multivitamin with minerals) tablet, Take 1 tablet by mouth daily, Disp: , Rfl:     oxyCODONE (ROXICODONE) 15 mg immediate release tablet, , Disp: , Rfl:     sildenafil (REVATIO) 20 mg tablet, Take 1 tablet (20 mg total) by mouth daily as needed (Erectile dysfunction), Disp: 30 tablet, Rfl: 6    XTAMPZA ER 36 MG C12A, , Disp: , Rfl:     Past Medical History:       Past Medical History:   Diagnosis Date    Chronic pain disorder     Disease of thyroid gland     Nausea & vomiting 1/2/2020        Past Surgical History:   Procedure Laterality Date    APPENDECTOMY      RESOLVED: 2000    CHOLECYSTECTOMY      RESOLVED: 2004    CHOLECYSTECTOMY      LUMBAR LAMINECTOMY      UMBILICAL HERNIA REPAIR      RESOLVED: 3284    UMBILICAL HERNIA REPAIR          Family History   Problem Relation Age of Onset    Cirrhosis Mother         HEPATIC    Cirrhosis Father         HEPATIC        Social History     Socioeconomic History    Marital status: /Civil Union     Spouse name: Not on file    Number of children: Not on file    Years of education: Not on file    Highest education level: Not on file   Occupational History    Not on file   Tobacco Use    Smoking status: Former Smoker    Smokeless tobacco: Never Used   Vaping Use    Vaping Use: Never used   Substance and Sexual Activity    Alcohol use: No    Drug use: No    Sexual activity: Yes     Partners: Female   Other Topics Concern    Not on file   Social History Narrative    Not on file     Social Determinants of Health     Financial Resource Strain: Not on file   Food Insecurity: Not on file   Transportation Needs: Not on file   Physical Activity: Not on file   Stress: Not on file   Social Connections: Not on file   Intimate Partner Violence: Not on file   Housing Stability: Not on file        Physical Exam:     Vitals:    06/14/22 1335   BP: 130/78   Pulse:    SpO2:        Physical Exam  Vitals reviewed  Constitutional:       General: He is not in acute distress  Appearance: He is obese  HENT:      Head: Normocephalic and atraumatic        Right Ear: External ear normal       Left Ear: External ear normal       Nose: Nose normal       Mouth/Throat:      Mouth: Mucous membranes are moist       Pharynx: Oropharynx is clear  Eyes:      Extraocular Movements: Extraocular movements intact  Conjunctiva/sclera: Conjunctivae normal       Pupils: Pupils are equal, round, and reactive to light  Cardiovascular:      Rate and Rhythm: Normal rate and regular rhythm  Pulses: Normal pulses  Heart sounds: Normal heart sounds  Pulmonary:      Effort: Pulmonary effort is normal       Breath sounds: Normal breath sounds  Abdominal:      General: Abdomen is flat  Bowel sounds are normal       Palpations: Abdomen is soft  Tenderness: There is no abdominal tenderness  Musculoskeletal:      Cervical back: Neck supple  No tenderness  Right lower leg: Edema (Lymphedema) present  Left lower leg: Edema ( lymphedema) present  Lymphadenopathy:      Cervical: No cervical adenopathy  Skin:     General: Skin is warm and dry  Neurological:      Mental Status: He is alert and oriented to person, place, and time  Psychiatric:         Mood and Affect: Mood normal          Behavior: Behavior normal          Thought Content: Thought content normal          Judgment: Judgment normal        Data:     Pre-operative work-up    Lipid panel, BMP, TSH, A1C    Assessment & Recommendations:     1  Pre-op exam  TSH, 3rd generation with Free T4 reflex    Basic metabolic panel    Lipid Panel with Direct LDL reflex   2  Acquired hypothyroidism     3  Chronic back pain, unspecified back location, unspecified back pain laterality     4  Lymphedema of right lower extremity     5  Chronic pain disorder     6  Screening for diabetes mellitus (DM)  HEMOGLOBIN A1C W/ EAG ESTIMATION       Pre-Op Evaluation Assessment  52 y o  male with planned surgery:  MRI under IV sedation  Known risk factors for perioperative complications: None  Cardiac Risk Estimation: per the Revised Cardiac Risk Index (Circ  [id number], 1999), RCI RISK CLASS I (0 risk factors, risk of major cardiac compl  appr  0 5%)  Pre-Op Evaluation Plan  1  Further preoperative workup as follows:   - Basic metabolic profile, TSH, F2O    2  Medication Management/Recommendations:     -Continue levothyroxine morning of imaging     3  Patient requires further consultation with: None    Clearance    Patient is medically optimized for planned MRI under IV sedation       Sally Min DO  [facility]  [address] [address]  Phone#  [phone]  Fax#  [phone]

## 2022-06-17 ENCOUNTER — HOSPITAL ENCOUNTER (OUTPATIENT)
Dept: MRI IMAGING | Facility: HOSPITAL | Age: 47
Discharge: HOME | End: 2022-06-17

## 2022-07-27 ENCOUNTER — TELEPHONE (OUTPATIENT)
Dept: FAMILY MEDICINE CLINIC | Facility: MEDICAL CENTER | Age: 47
End: 2022-07-27

## 2022-07-27 NOTE — PRE-PROCEDURE INSTRUCTIONS
Pre-Surgery Instructions:   Medication Instructions    allopurinol (ZYLOPRIM) 100 mg tablet Take day of surgery   cholecalciferol (VITAMIN D3) 400 units tablet Hold day of surgery   levothyroxine 200 mcg tablet Take day of surgery   levothyroxine 25 mcg tablet Take day of surgery   Multiple Vitamins-Minerals (multivitamin with minerals) tablet Hold day of surgery   oxyCODONE (ROXICODONE) 15 mg immediate release tablet Hold day of MRI    XTAMPZA ER 36 MG C12A Take day of surgery

## 2022-08-01 ENCOUNTER — TELEPHONE (OUTPATIENT)
Dept: FAMILY MEDICINE CLINIC | Facility: MEDICAL CENTER | Age: 47
End: 2022-08-01

## 2022-08-01 NOTE — TELEPHONE ENCOUNTER
Pt's wife called  She said pt has opportunity to work overtime tomorrow, but he has an appt for another clearance for his MRI with sedation  She said she talked to the MRI center and they told they would accept the notes from his appt on 6/14/22 with an updated signature and date  She asked if Dr Lionel Singletary would be willing to do that  Per pt, pt has had no changes in his medical conditions since he last saw Dr Lionel Singletary agreed, wrote additions on the previous notes, and signed and dated it today  Pt's wife will  the addended notes tomorrow  Papers were scanned into media to show the changes and were placed in the drawer at the       Routed to Dr Lionel Singletary - Cyndee Louie

## 2022-08-04 ENCOUNTER — ANESTHESIA EVENT (OUTPATIENT)
Dept: MRI IMAGING | Facility: HOSPITAL | Age: 47
End: 2022-08-04

## 2022-08-05 ENCOUNTER — HOSPITAL ENCOUNTER (OUTPATIENT)
Dept: MRI IMAGING | Facility: HOSPITAL | Age: 47
Discharge: HOME | End: 2022-08-05
Payer: COMMERCIAL

## 2022-08-05 ENCOUNTER — ANESTHESIA (OUTPATIENT)
Dept: MRI IMAGING | Facility: HOSPITAL | Age: 47
End: 2022-08-05

## 2022-08-05 VITALS
BODY MASS INDEX: 37.8 KG/M2 | WEIGHT: 304 LBS | SYSTOLIC BLOOD PRESSURE: 137 MMHG | HEIGHT: 75 IN | DIASTOLIC BLOOD PRESSURE: 86 MMHG | OXYGEN SATURATION: 95 % | HEART RATE: 63 BPM | RESPIRATION RATE: 20 BRPM | TEMPERATURE: 97.1 F

## 2022-08-05 DIAGNOSIS — M25.519 PAIN IN SHOULDER: ICD-10-CM

## 2022-08-05 DIAGNOSIS — M75.40 IMPINGEMENT SYNDROME OF SHOULDER: ICD-10-CM

## 2022-08-05 DIAGNOSIS — M25.519 PAIN IN UNSPECIFIED SHOULDER: ICD-10-CM

## 2022-08-05 PROCEDURE — 73221 MRI JOINT UPR EXTREM W/O DYE: CPT

## 2022-08-05 PROCEDURE — G1004 CDSM NDSC: HCPCS

## 2022-08-05 RX ORDER — PROPOFOL 10 MG/ML
INJECTION, EMULSION INTRAVENOUS CONTINUOUS PRN
Status: DISCONTINUED | OUTPATIENT
Start: 2022-08-05 | End: 2022-08-05

## 2022-08-05 RX ORDER — KETAMINE HCL IN NACL, ISO-OSM 100MG/10ML
SYRINGE (ML) INJECTION AS NEEDED
Status: DISCONTINUED | OUTPATIENT
Start: 2022-08-05 | End: 2022-08-05

## 2022-08-05 RX ORDER — FENTANYL CITRATE 50 UG/ML
INJECTION, SOLUTION INTRAMUSCULAR; INTRAVENOUS AS NEEDED
Status: DISCONTINUED | OUTPATIENT
Start: 2022-08-05 | End: 2022-08-05

## 2022-08-05 RX ORDER — SODIUM CHLORIDE 9 MG/ML
INJECTION, SOLUTION INTRAVENOUS CONTINUOUS PRN
Status: DISCONTINUED | OUTPATIENT
Start: 2022-08-05 | End: 2022-08-05

## 2022-08-05 RX ORDER — SODIUM CHLORIDE 9 MG/ML
50 INJECTION, SOLUTION INTRAVENOUS CONTINUOUS
Status: DISCONTINUED | OUTPATIENT
Start: 2022-08-05 | End: 2022-08-09 | Stop reason: HOSPADM

## 2022-08-05 RX ORDER — MIDAZOLAM HYDROCHLORIDE 2 MG/2ML
INJECTION, SOLUTION INTRAMUSCULAR; INTRAVENOUS AS NEEDED
Status: DISCONTINUED | OUTPATIENT
Start: 2022-08-05 | End: 2022-08-05

## 2022-08-05 RX ADMIN — PROPOFOL 100 MCG/KG/MIN: 10 INJECTION, EMULSION INTRAVENOUS at 08:49

## 2022-08-05 RX ADMIN — SODIUM CHLORIDE: 0.9 INJECTION, SOLUTION INTRAVENOUS at 08:40

## 2022-08-05 RX ADMIN — Medication 20 MG: at 08:50

## 2022-08-05 RX ADMIN — MIDAZOLAM 2 MG: 1 INJECTION INTRAMUSCULAR; INTRAVENOUS at 08:46

## 2022-08-05 RX ADMIN — FENTANYL CITRATE 50 MCG: 50 INJECTION, SOLUTION INTRAMUSCULAR; INTRAVENOUS at 08:51

## 2022-08-05 RX ADMIN — FENTANYL CITRATE 50 MCG: 50 INJECTION, SOLUTION INTRAMUSCULAR; INTRAVENOUS at 08:48

## 2022-08-05 NOTE — ANESTHESIA POSTPROCEDURE EVALUATION
Post-Op Assessment Note    CV Status:  Stable    Pain management: adequate     Mental Status:  Sleepy   Hydration Status:  Euvolemic   PONV Controlled:  Controlled   Airway Patency:  Patent      Post Op Vitals Reviewed: Yes      Staff: Anesthesiologist, CRNA         No complications documented      BP   123/69   Temp      Pulse  65   Resp   14   SpO2   98

## 2022-08-05 NOTE — ANESTHESIA PREPROCEDURE EVALUATION
Procedure:  MRI SHOULDER RIGHT WO CONTRAST    Relevant Problems   CARDIO   (+) Hyperlipidemia   (+) Hypertension      ENDO   (+) Hypothyroidism      MUSCULOSKELETAL   (+) Chronic back pain      NEURO/PSYCH   (+) Chronic back pain   (+) Chronic pain disorder        Physical Exam    Airway    Mallampati score: II  TM Distance: >3 FB  Neck ROM: full     Dental   No notable dental hx     Cardiovascular  Cardiovascular exam normal    Pulmonary  Pulmonary exam normal     Other Findings        Anesthesia Plan  ASA Score- 2     Anesthesia Type- IV sedation with anesthesia with ASA Monitors  Additional Monitors:   Airway Plan:     Comment: I discussed risks (reviewed with patient on the anesthesia consent form), benefits and alternatives of monitored sedation including the possibility under sedation to have recall          Plan Factors-    Chart reviewed  Patient summary reviewed  Induction- intravenous  Postoperative Plan-     Informed Consent- Anesthetic plan and risks discussed with patient  I personally reviewed this patient with the CRNA  Discussed and agreed on the Anesthesia Plan with the CRNA  Ryan Najera

## 2022-08-05 NOTE — H&P
I reviewed the History and Physical scanned into [facility] dated 8/2/2022 in media from PCP      There are no updates to patient condition and he is in his normal state of health    Vitals:    08/05/22 0750   BP: 152/98   Pulse: 68   Resp: 18   Temp: 98 °F (36 7 °C)   SpO2: 96%     Signature: Amita Orozco MD Date/Time: August 5, 2022 / 8:00 AM

## 2022-08-14 DIAGNOSIS — E03.9 ACQUIRED HYPOTHYROIDISM: ICD-10-CM

## 2022-08-15 DIAGNOSIS — M10.9 GOUT, UNSPECIFIED CAUSE, UNSPECIFIED CHRONICITY, UNSPECIFIED SITE: ICD-10-CM

## 2022-08-15 RX ORDER — LEVOTHYROXINE SODIUM 0.2 MG/1
TABLET ORAL
Qty: 90 TABLET | Refills: 1 | Status: SHIPPED | OUTPATIENT
Start: 2022-08-15

## 2022-08-16 RX ORDER — ALLOPURINOL 100 MG/1
TABLET ORAL
Qty: 90 TABLET | Refills: 1 | Status: SHIPPED | OUTPATIENT
Start: 2022-08-16

## 2022-09-12 ENCOUNTER — TELEPHONE (OUTPATIENT)
Dept: FAMILY MEDICINE CLINIC | Facility: MEDICAL CENTER | Age: 47
End: 2022-09-12

## 2022-09-12 NOTE — PRE-PROCEDURE INSTRUCTIONS
Pre-Surgery Instructions:   Medication Instructions    allopurinol (ZYLOPRIM) 100 mg tablet Take day of surgery   cholecalciferol (VITAMIN D3) 400 units tablet Hold day of surgery   ibuprofen (MOTRIN) 800 mg tablet Uses PRN- OK to take day of surgery    levothyroxine 200 mcg tablet Take day of surgery   levothyroxine 25 mcg tablet Take day of surgery   Multiple Vitamins-Minerals (multivitamin with minerals) tablet Hold day of surgery   oxyCODONE (ROXICODONE) 15 mg immediate release tablet Uses PRN- OK to take day of surgery    sildenafil (REVATIO) 20 mg tablet Uses PRN- DO NOT take day of surgery    XTAMPZA ER 36 MG C12A Hold day of surgery

## 2022-09-12 NOTE — TELEPHONE ENCOUNTER
Pre-op notes printed; Dr Vu Sellers signed and dated  Spoke with wife to make her aware    Notes placed in drawer at

## 2022-09-12 NOTE — TELEPHONE ENCOUNTER
Pt's wife called  She said pt has an appt for another MRI with sedation  Wife said she spoke with the MRI center and they told they would accept the notes from his appt on 6/14/22 with an updated signature and date  Wife asked if Dr Heike Guerrier would be willing to do that again  Per wife, pt has had no changes in his medical conditions since he last saw Dr Heike Guerrier  If OK, please call wife when notes are ready for pickup  [phone]     Routed to Dr Heike Guerrier - would you be willing to do this again?

## 2022-09-14 ENCOUNTER — TELEPHONE (OUTPATIENT)
Dept: FAMILY MEDICINE CLINIC | Facility: MEDICAL CENTER | Age: 47
End: 2022-09-14

## 2022-09-14 NOTE — TELEPHONE ENCOUNTER
For pt's upcoming MRI, they need the actual form filled out not just the office notes  Form is in your bin    If ok fax back to Juanito at [phone]

## 2022-09-16 ENCOUNTER — HOSPITAL ENCOUNTER (OUTPATIENT)
Dept: MRI IMAGING | Facility: HOSPITAL | Age: 47
End: 2022-09-16
Payer: COMMERCIAL

## 2022-09-16 ENCOUNTER — APPOINTMENT (OUTPATIENT)
Dept: MRI IMAGING | Facility: HOSPITAL | Age: 47
End: 2022-09-16
Payer: COMMERCIAL

## 2022-09-16 VITALS
HEIGHT: 75 IN | DIASTOLIC BLOOD PRESSURE: 67 MMHG | SYSTOLIC BLOOD PRESSURE: 117 MMHG | OXYGEN SATURATION: 95 % | RESPIRATION RATE: 18 BRPM | BODY MASS INDEX: 37.8 KG/M2 | TEMPERATURE: 97 F | HEART RATE: 70 BPM | WEIGHT: 304 LBS

## 2022-09-16 DIAGNOSIS — M47.10 SPONDYLOSIS WITH MYELOPATHY: ICD-10-CM

## 2022-09-16 DIAGNOSIS — M96.1 POST LAMINECTOMY SYNDROME: ICD-10-CM

## 2022-09-16 PROCEDURE — 72148 MRI LUMBAR SPINE W/O DYE: CPT

## 2022-09-16 RX ORDER — GLYCOPYRROLATE 0.2 MG/ML
INJECTION INTRAMUSCULAR; INTRAVENOUS AS NEEDED
Status: DISCONTINUED | OUTPATIENT
Start: 2022-09-16 | End: 2022-09-16

## 2022-09-16 RX ORDER — SODIUM CHLORIDE 9 MG/ML
INJECTION, SOLUTION INTRAVENOUS CONTINUOUS PRN
Status: DISCONTINUED | OUTPATIENT
Start: 2022-09-16 | End: 2022-09-16

## 2022-09-16 RX ORDER — PROPOFOL 10 MG/ML
INJECTION, EMULSION INTRAVENOUS CONTINUOUS PRN
Status: DISCONTINUED | OUTPATIENT
Start: 2022-09-16 | End: 2022-09-16

## 2022-09-16 RX ORDER — MIDAZOLAM HYDROCHLORIDE 2 MG/2ML
INJECTION, SOLUTION INTRAMUSCULAR; INTRAVENOUS AS NEEDED
Status: DISCONTINUED | OUTPATIENT
Start: 2022-09-16 | End: 2022-09-16

## 2022-09-16 RX ADMIN — MIDAZOLAM 2 MG: 1 INJECTION INTRAMUSCULAR; INTRAVENOUS at 07:37

## 2022-09-16 RX ADMIN — GLYCOPYRROLATE 0.2 MCG: 0.2 INJECTION, SOLUTION INTRAMUSCULAR; INTRAVENOUS at 07:34

## 2022-09-16 RX ADMIN — SODIUM CHLORIDE: 0.9 INJECTION, SOLUTION INTRAVENOUS at 07:30

## 2022-09-16 RX ADMIN — PROPOFOL 100 MCG/KG/MIN: 10 INJECTION, EMULSION INTRAVENOUS at 07:38

## 2022-09-16 NOTE — ANESTHESIA POSTPROCEDURE EVALUATION
Post-Op Assessment Note    CV Status:  Stable  Pain Score: 0    Pain management: adequate     Mental Status:  Sleepy and arousable   Hydration Status:  Stable   PONV Controlled:  None   Airway Patency:  Patent and adequate      Post Op Vitals Reviewed: Yes      Staff: CRNA         No complications documented      /63 (09/16/22 0816)    Temp     Pulse 83 (09/16/22 0816)   Resp 18 (09/16/22 0816)    SpO2 95 % (09/16/22 0816)

## 2022-10-25 ENCOUNTER — APPOINTMENT (OUTPATIENT)
Dept: LAB | Facility: CLINIC | Age: 47
End: 2022-10-25
Payer: COMMERCIAL

## 2022-10-25 DIAGNOSIS — B35.1 DERMATOPHYTOSIS OF NAIL: ICD-10-CM

## 2022-10-25 DIAGNOSIS — M10.9 GOUT, UNSPECIFIED CAUSE, UNSPECIFIED CHRONICITY, UNSPECIFIED SITE: ICD-10-CM

## 2022-10-25 DIAGNOSIS — Z01.818 PRE-OP EXAM: ICD-10-CM

## 2022-10-25 LAB
ALBUMIN SERPL BCP-MCNC: 3.6 G/DL (ref 3.5–5)
ALP SERPL-CCNC: 67 U/L (ref 46–116)
ALT SERPL W P-5'-P-CCNC: 58 U/L (ref 12–78)
AST SERPL W P-5'-P-CCNC: 25 U/L (ref 5–45)
BILIRUB DIRECT SERPL-MCNC: 0.11 MG/DL (ref 0–0.2)
BILIRUB SERPL-MCNC: 0.35 MG/DL (ref 0.2–1)
PROT SERPL-MCNC: 7.9 G/DL (ref 6.4–8.4)
URATE SERPL-MCNC: 6.7 MG/DL (ref 3.5–8.5)

## 2022-10-25 PROCEDURE — 36415 COLL VENOUS BLD VENIPUNCTURE: CPT

## 2022-10-25 PROCEDURE — 80076 HEPATIC FUNCTION PANEL: CPT

## 2022-10-25 PROCEDURE — 84550 ASSAY OF BLOOD/URIC ACID: CPT

## 2022-10-28 ENCOUNTER — APPOINTMENT (OUTPATIENT)
Dept: LAB | Facility: CLINIC | Age: 47
End: 2022-10-28

## 2022-10-28 LAB
CHOLEST SERPL-MCNC: 173 MG/DL
HDLC SERPL-MCNC: 50 MG/DL
LDLC SERPL CALC-MCNC: 110 MG/DL (ref 0–100)
TRIGL SERPL-MCNC: 66 MG/DL

## 2022-10-28 PROCEDURE — 36415 COLL VENOUS BLD VENIPUNCTURE: CPT

## 2022-11-22 DIAGNOSIS — E03.9 ACQUIRED HYPOTHYROIDISM: ICD-10-CM

## 2022-11-22 RX ORDER — LEVOTHYROXINE SODIUM 0.03 MG/1
25 TABLET ORAL DAILY
Qty: 90 TABLET | Refills: 1 | Status: SHIPPED | OUTPATIENT
Start: 2022-11-22

## 2023-02-11 DIAGNOSIS — M10.9 GOUT, UNSPECIFIED CAUSE, UNSPECIFIED CHRONICITY, UNSPECIFIED SITE: ICD-10-CM

## 2023-02-13 RX ORDER — ALLOPURINOL 100 MG/1
TABLET ORAL
Qty: 90 TABLET | Refills: 1 | Status: SHIPPED | OUTPATIENT
Start: 2023-02-13

## 2023-02-14 DIAGNOSIS — E03.9 ACQUIRED HYPOTHYROIDISM: ICD-10-CM

## 2023-02-14 RX ORDER — LEVOTHYROXINE SODIUM 0.2 MG/1
TABLET ORAL
Qty: 90 TABLET | Refills: 1 | Status: SHIPPED | OUTPATIENT
Start: 2023-02-14

## 2023-05-14 DIAGNOSIS — E03.9 ACQUIRED HYPOTHYROIDISM: ICD-10-CM

## 2023-05-15 RX ORDER — LEVOTHYROXINE SODIUM 0.03 MG/1
TABLET ORAL
Qty: 90 TABLET | Refills: 1 | Status: SHIPPED | OUTPATIENT
Start: 2023-05-15

## 2023-08-22 DIAGNOSIS — E03.9 ACQUIRED HYPOTHYROIDISM: ICD-10-CM

## 2023-08-22 RX ORDER — LEVOTHYROXINE SODIUM 0.2 MG/1
200 TABLET ORAL DAILY
Qty: 90 TABLET | Refills: 1 | Status: SHIPPED | OUTPATIENT
Start: 2023-08-22

## 2023-08-31 DIAGNOSIS — M10.9 GOUT, UNSPECIFIED CAUSE, UNSPECIFIED CHRONICITY, UNSPECIFIED SITE: ICD-10-CM

## 2023-08-31 RX ORDER — ALLOPURINOL 100 MG/1
100 TABLET ORAL DAILY
Qty: 90 TABLET | Refills: 1 | Status: SHIPPED | OUTPATIENT
Start: 2023-08-31

## 2023-09-13 DIAGNOSIS — E03.9 ACQUIRED HYPOTHYROIDISM: ICD-10-CM

## 2023-09-13 RX ORDER — LEVOTHYROXINE SODIUM 0.03 MG/1
TABLET ORAL
Qty: 90 TABLET | Refills: 0 | Status: SHIPPED | OUTPATIENT
Start: 2023-09-13

## 2023-10-11 DIAGNOSIS — E03.9 ACQUIRED HYPOTHYROIDISM: ICD-10-CM

## 2023-10-11 RX ORDER — LEVOTHYROXINE SODIUM 0.2 MG/1
200 TABLET ORAL DAILY
Qty: 30 TABLET | Refills: 0 | Status: SHIPPED | OUTPATIENT
Start: 2023-10-11 | End: 2023-10-18 | Stop reason: SDUPTHER

## 2023-10-11 NOTE — TELEPHONE ENCOUNTER
Patient needs updated blood work. Please place lab orders. Patient needs an appointment. Please contact patient to schedule an appointment. 30 day supply ordered with 0 refills.

## 2023-10-11 NOTE — TELEPHONE ENCOUNTER
Spoke with patients spouse Cathleen Mahajanlui had stated that the pharmacy had told the patient that he needed the doctors approval for this medication for the levothyroxine 200 mcg. Please advise.

## 2023-10-18 DIAGNOSIS — Z51.81 MEDICATION MONITORING ENCOUNTER: ICD-10-CM

## 2023-10-18 DIAGNOSIS — E78.5 HYPERLIPIDEMIA, UNSPECIFIED HYPERLIPIDEMIA TYPE: ICD-10-CM

## 2023-10-18 DIAGNOSIS — E03.9 ACQUIRED HYPOTHYROIDISM: Primary | ICD-10-CM

## 2023-10-18 RX ORDER — LEVOTHYROXINE SODIUM 0.2 MG/1
200 TABLET ORAL DAILY
Qty: 90 TABLET | Refills: 0 | Status: SHIPPED | OUTPATIENT
Start: 2023-10-18 | End: 2024-01-16

## 2023-10-18 RX ORDER — LEVOTHYROXINE SODIUM 0.03 MG/1
25 TABLET ORAL DAILY
Qty: 90 TABLET | Refills: 0 | Status: SHIPPED | OUTPATIENT
Start: 2023-10-18 | End: 2024-01-16

## 2023-10-18 NOTE — TELEPHONE ENCOUNTER
Spoke with pt. He needed something later in the day. Scheduled appt for 11/29/23 @ 3:40. Advised pt we will ask Dr Denette Simmonds to place the bloodwork orders. He requested to have his medication sent for 90 day supply rather than 30.     Routed to Dr Denette Simmonds - please place orders  Routed to [facility] - please send Rx for 90 day supply

## 2023-11-29 ENCOUNTER — TELEPHONE (OUTPATIENT)
Age: 48
End: 2023-11-29

## 2023-11-29 NOTE — TELEPHONE ENCOUNTER
Isaias wife called to reschedule his appointment. While on the phone she asked if labs were ordered for him. She states he needs labs completed prior to his next appointment. She is not on his medical communication consent form, so I was unable to give her that information. Is there a way to send a message to his MyChart notifying him, the labs are in his chart?     Please advise, thank you

## 2023-11-29 NOTE — TELEPHONE ENCOUNTER
ONEILOM to make pt aware his appt was rescheduled and that there are open bloodwork orders in the system for him to do prior to his appt with Dr Nely Bose

## 2023-12-20 DIAGNOSIS — E03.9 ACQUIRED HYPOTHYROIDISM: ICD-10-CM

## 2023-12-20 RX ORDER — LEVOTHYROXINE SODIUM 0.2 MG/1
200 TABLET ORAL DAILY
Qty: 30 TABLET | Refills: 0 | Status: SHIPPED | OUTPATIENT
Start: 2023-12-20 | End: 2023-12-20

## 2023-12-20 RX ORDER — LEVOTHYROXINE SODIUM 0.03 MG/1
25 TABLET ORAL DAILY
Qty: 30 TABLET | Refills: 0 | Status: SHIPPED | OUTPATIENT
Start: 2023-12-20

## 2023-12-20 RX ORDER — LEVOTHYROXINE SODIUM 0.2 MG/1
200 TABLET ORAL DAILY
Qty: 90 TABLET | Refills: 0 | Status: SHIPPED | OUTPATIENT
Start: 2023-12-20

## 2023-12-20 NOTE — TELEPHONE ENCOUNTER
Patient needs an appointment. Please contact the patient to schedule an appointment. Courtesy refill provided.  Per ins must be 90 day refill

## 2024-01-11 DIAGNOSIS — E03.9 ACQUIRED HYPOTHYROIDISM: ICD-10-CM

## 2024-01-11 RX ORDER — LEVOTHYROXINE SODIUM 0.03 MG/1
25 TABLET ORAL DAILY
Qty: 90 TABLET | Refills: 1 | OUTPATIENT
Start: 2024-01-11

## 2024-01-11 NOTE — TELEPHONE ENCOUNTER
Patient needs an appointment. He was already given a courtesy refill and we have been trying to reach him to set up an appointment since November. It is not safe to keep taking this medication without being monitored.

## 2024-01-18 NOTE — TELEPHONE ENCOUNTER
I spoke with the patient. He is unsure why the pharmacy continues to send for his refills.  He received a 90 day supply 12/2023 for his levothyroxine.  Patient now living in [place] and trying to find new PCP within [facility].  Offered to coordinate care for patient but he declined at this time. He will call back if assistance is needed.

## 2024-02-09 ENCOUNTER — APPOINTMENT (OUTPATIENT)
Dept: LAB | Facility: CLINIC | Age: 49
End: 2024-02-09
Payer: COMMERCIAL

## 2024-02-09 DIAGNOSIS — E03.9 ACQUIRED HYPOTHYROIDISM: ICD-10-CM

## 2024-02-09 DIAGNOSIS — Z51.81 MEDICATION MONITORING ENCOUNTER: ICD-10-CM

## 2024-02-09 DIAGNOSIS — E78.5 HYPERLIPIDEMIA, UNSPECIFIED HYPERLIPIDEMIA TYPE: ICD-10-CM

## 2024-02-09 LAB
ANION GAP SERPL CALCULATED.3IONS-SCNC: 7 MMOL/L
BUN SERPL-MCNC: 21 MG/DL (ref 5–25)
CALCIUM SERPL-MCNC: 9.3 MG/DL (ref 8.4–10.2)
CHLORIDE SERPL-SCNC: 100 MMOL/L (ref 96–108)
CHOLEST SERPL-MCNC: 165 MG/DL
CO2 SERPL-SCNC: 31 MMOL/L (ref 21–32)
CREAT SERPL-MCNC: 1.05 MG/DL (ref 0.6–1.3)
GFR SERPL CREATININE-BSD FRML MDRD: 83 ML/MIN/1.73SQ M
GLUCOSE P FAST SERPL-MCNC: 137 MG/DL (ref 65–99)
HDLC SERPL-MCNC: 46 MG/DL
LDLC SERPL CALC-MCNC: 94 MG/DL (ref 0–100)
POTASSIUM SERPL-SCNC: 4.3 MMOL/L (ref 3.5–5.3)
SODIUM SERPL-SCNC: 138 MMOL/L (ref 135–147)
TRIGL SERPL-MCNC: 126 MG/DL
TSH SERPL DL<=0.05 MIU/L-ACNC: 2.65 UIU/ML (ref 0.45–4.5)
URATE SERPL-MCNC: 7.4 MG/DL (ref 3.5–8.5)

## 2024-02-09 PROCEDURE — 80061 LIPID PANEL: CPT

## 2024-02-09 PROCEDURE — 36415 COLL VENOUS BLD VENIPUNCTURE: CPT

## 2024-02-09 PROCEDURE — 84550 ASSAY OF BLOOD/URIC ACID: CPT

## 2024-02-09 PROCEDURE — 84443 ASSAY THYROID STIM HORMONE: CPT

## 2024-02-09 PROCEDURE — 80048 BASIC METABOLIC PNL TOTAL CA: CPT

## 2024-02-16 ENCOUNTER — OFFICE VISIT (OUTPATIENT)
Dept: FAMILY MEDICINE CLINIC | Facility: CLINIC | Age: 49
End: 2024-02-16
Payer: COMMERCIAL

## 2024-02-16 VITALS
OXYGEN SATURATION: 95 % | SYSTOLIC BLOOD PRESSURE: 178 MMHG | DIASTOLIC BLOOD PRESSURE: 102 MMHG | TEMPERATURE: 99.6 F | BODY MASS INDEX: 39.17 KG/M2 | WEIGHT: 315 LBS | HEART RATE: 97 BPM | HEIGHT: 75 IN

## 2024-02-16 DIAGNOSIS — Z12.12 SCREENING FOR COLORECTAL CANCER: ICD-10-CM

## 2024-02-16 DIAGNOSIS — Z13.0 SCREENING FOR DEFICIENCY ANEMIA: ICD-10-CM

## 2024-02-16 DIAGNOSIS — R79.89 LOW TESTOSTERONE IN MALE: ICD-10-CM

## 2024-02-16 DIAGNOSIS — R73.01 ELEVATED FASTING GLUCOSE: ICD-10-CM

## 2024-02-16 DIAGNOSIS — R68.82 DECREASED LIBIDO: ICD-10-CM

## 2024-02-16 DIAGNOSIS — Z13.1 SCREENING FOR DIABETES MELLITUS: ICD-10-CM

## 2024-02-16 DIAGNOSIS — E78.5 HYPERLIPIDEMIA, UNSPECIFIED HYPERLIPIDEMIA TYPE: ICD-10-CM

## 2024-02-16 DIAGNOSIS — I10 PRIMARY HYPERTENSION: ICD-10-CM

## 2024-02-16 DIAGNOSIS — E03.9 ACQUIRED HYPOTHYROIDISM: ICD-10-CM

## 2024-02-16 DIAGNOSIS — N40.0 BPH WITHOUT OBSTRUCTION/LOWER URINARY TRACT SYMPTOMS: ICD-10-CM

## 2024-02-16 DIAGNOSIS — R53.83 OTHER FATIGUE: ICD-10-CM

## 2024-02-16 DIAGNOSIS — Z76.89 ENCOUNTER TO ESTABLISH CARE WITH NEW DOCTOR: Primary | ICD-10-CM

## 2024-02-16 DIAGNOSIS — Z11.4 SCREENING FOR HIV (HUMAN IMMUNODEFICIENCY VIRUS): ICD-10-CM

## 2024-02-16 DIAGNOSIS — Z11.59 NEED FOR HEPATITIS C SCREENING TEST: ICD-10-CM

## 2024-02-16 DIAGNOSIS — Z12.11 SCREENING FOR COLORECTAL CANCER: ICD-10-CM

## 2024-02-16 DIAGNOSIS — Z12.11 SCREENING FOR COLON CANCER: ICD-10-CM

## 2024-02-16 DIAGNOSIS — Z23 ENCOUNTER FOR IMMUNIZATION: ICD-10-CM

## 2024-02-16 LAB — SL AMB POCT HEMOGLOBIN AIC: 6.2 (ref ?–6.5)

## 2024-02-16 PROCEDURE — 99214 OFFICE O/P EST MOD 30 MIN: CPT | Performed by: NURSE PRACTITIONER

## 2024-02-16 PROCEDURE — 83036 HEMOGLOBIN GLYCOSYLATED A1C: CPT | Performed by: NURSE PRACTITIONER

## 2024-02-16 RX ORDER — AMLODIPINE BESYLATE 5 MG/1
5 TABLET ORAL DAILY
Qty: 30 TABLET | Refills: 1 | Status: SHIPPED | OUTPATIENT
Start: 2024-02-16

## 2024-02-16 RX ORDER — LEVOTHYROXINE SODIUM 0.2 MG/1
200 TABLET ORAL DAILY
Qty: 90 TABLET | Refills: 1 | Status: SHIPPED | OUTPATIENT
Start: 2024-02-16

## 2024-02-16 RX ORDER — LEVOTHYROXINE SODIUM 0.03 MG/1
25 TABLET ORAL DAILY
Qty: 90 TABLET | Refills: 1 | Status: SHIPPED | OUTPATIENT
Start: 2024-02-16

## 2024-02-16 NOTE — PROGRESS NOTES
Assessment/Plan:    Problem List Items Addressed This Visit     Hyperlipidemia    Relevant Orders    Lipid Panel with Direct LDL reflex    Hypothyroidism    Relevant Medications    levothyroxine 200 mcg tablet    levothyroxine 25 mcg tablet    Hypertension    Relevant Medications    amLODIPine (NORVASC) 5 mg tablet    Other Relevant Orders    POCT ECG    Decreased libido    Relevant Orders    Testosterone, free, total    Other fatigue    Relevant Orders    Testosterone, free, total    Low testosterone in male    Relevant Orders    Testosterone, free, total   Other Visit Diagnoses     Encounter to establish care with new doctor    -  Primary    Encounter for immunization        Need for hepatitis C screening test        Relevant Orders    Hepatitis C Antibody    Screening for HIV (human immunodeficiency virus)        Elevated fasting glucose        Relevant Orders    Comprehensive metabolic panel    POCT hemoglobin A1c (Completed)    Screening for deficiency anemia        Relevant Orders    CBC and differential    Screening for diabetes mellitus        Relevant Orders    Comprehensive metabolic panel    Screening for colon cancer        Screening for colorectal cancer        Relevant Orders    Ambulatory Referral to Gastroenterology    BPH without obstruction/lower urinary tract symptoms        Relevant Orders    PSA, Total Screen           Diagnoses and all orders for this visit:    Encounter to establish care with new doctor    Encounter for immunization    Need for hepatitis C screening test  -     Hepatitis C Antibody; Future    Screening for HIV (human immunodeficiency virus)    Elevated fasting glucose  -     Comprehensive metabolic panel; Future  -     POCT hemoglobin A1c    Screening for deficiency anemia  -     CBC and differential; Future    Screening for diabetes mellitus  -     Comprehensive metabolic panel; Future    Acquired hypothyroidism  -     levothyroxine 200 mcg tablet; Take 1 tablet (200 mcg  total) by mouth daily  -     levothyroxine 25 mcg tablet; Take 1 tablet (25 mcg total) by mouth daily    Screening for colon cancer    Primary hypertension  -     POCT ECG  -     amLODIPine (NORVASC) 5 mg tablet; Take 1 tablet (5 mg total) by mouth daily    Hyperlipidemia, unspecified hyperlipidemia type  -     Lipid Panel with Direct LDL reflex; Future    Screening for colorectal cancer  -     Ambulatory Referral to Gastroenterology; Future    BPH without obstruction/lower urinary tract symptoms  -     PSA, Total Screen; Future    Other fatigue  -     Testosterone, free, total; Future    Decreased libido  -     Testosterone, free, total; Future    Low testosterone in male  -     Testosterone, free, total; Future        No problem-specific Assessment & Plan notes found for this encounter.        Subjective:      Patient ID: Rodrigo Lemons is a 48 y.o. male.    NP with PMHx gout, HLD, HTN, lymphedema RLE, venous insufficiency BLLE, and chronic pain disorder/LBP presents to establish care.     Pt reports concern with urination, ED, decreased libido, low testosterone - he was test prior.   Reports early H/O colon CA.      IPSS Questionnaire (AUA-7):  Over the past month...   1)  How often have you had a sensation of not emptying your bladder completely after you finish urinating?  4 - More than half the time  2)  How often have you had to urinate again less than two hours after you finished urinating? 2 - Less than half the time  3)  How often have you found you stopped and started again several times when you urinated?  3 - About half the time  4) How difficult have you found it to postpone urination?  1 - Less than 1 time in 5  5) How often have you had a weak urinary stream?  1 - Less than 1 time in 5  6) How often have you had to push or strain to begin urination?  0 - Not at all  7) How many times did you most typically get up to urinate from the time you went to bed until the time you got up in the morning?  5 - 5+  times  Total score:  0-7 mildly symptomatic   8-19 moderately symptomatic   20-35 severely symptomatic       Hypertension  This is a chronic problem. The problem is uncontrolled. There are no associated agents to hypertension. Risk factors for coronary artery disease include male gender and obesity. There are no compliance problems.  There is no history of angina, kidney disease or CAD/MI. There is no history of chronic renal disease.   Diabetes  He presents for his initial diabetic visit. Diabetes type: Pre-DM. There are no hypoglycemic associated symptoms. There are no diabetic associated symptoms. There are no hypoglycemic complications. There are no diabetic complications. Risk factors for coronary artery disease include hypertension, male sex and obesity.     A1c noted   Lab Results   Component Value Date    HGBA1C 6.2 02/16/2024        Recommend lifestyle and dietary changes which include plant based low glycemic diet.  Counseled at length on the importance of diet and exercise regarding the control of their diabetes.            The following portions of the patient's history were reviewed and updated as appropriate:   He has a past medical history of Chronic pain disorder, Disease of thyroid gland, and Nausea & vomiting (1/2/2020).,  does not have any pertinent problems on file.,   has a past surgical history that includes Appendectomy; Cholecystectomy; Cholecystectomy; Lumbar laminectomy; Umbilical hernia repair; and Umbilical hernia repair.,  family history includes Cirrhosis in his father and mother.,   reports that he has quit smoking. He has never used smokeless tobacco. He reports that he does not drink alcohol and does not use drugs.,  has No Known Allergies..  Current Outpatient Medications   Medication Sig Dispense Refill   • allopurinol (ZYLOPRIM) 100 mg tablet Take 1 tablet (100 mg total) by mouth daily 90 tablet 1   • amLODIPine (NORVASC) 5 mg tablet Take 1 tablet (5 mg total) by mouth daily 30  "tablet 1   • cholecalciferol (VITAMIN D3) 400 units tablet Take 1,000 Units by mouth daily     • ibuprofen (MOTRIN) 800 mg tablet Take 800 mg by mouth every 6 (six) hours as needed for mild pain     • levothyroxine 200 mcg tablet Take 1 tablet (200 mcg total) by mouth daily 90 tablet 1   • levothyroxine 25 mcg tablet Take 1 tablet (25 mcg total) by mouth daily 90 tablet 1   • Multiple Vitamins-Minerals (multivitamin with minerals) tablet Take 1 tablet by mouth daily     • oxyCODONE (ROXICODONE) 15 mg immediate release tablet      • sildenafil (REVATIO) 20 mg tablet Take 1 tablet (20 mg total) by mouth daily as needed (Erectile dysfunction) (Patient not taking: Reported on 2/16/2024) 30 tablet 6   • XTAMPZA ER 36 MG C12A  (Patient not taking: Reported on 2/16/2024)       No current facility-administered medications for this visit.       Depression Screening and Follow-up Plan: Patient was screened for depression during today's encounter. They screened negative with a PHQ-2 score of 0.          Review of Systems   HENT:  Negative for nosebleeds.    Cardiovascular:  Positive for leg swelling.   Genitourinary:  Positive for frequency and urgency.        Decreased libido   Musculoskeletal:  Positive for back pain.   All other systems reviewed and are negative.        Objective:  Vitals:    02/16/24 1500   BP: (!) 178/102   Pulse: 97   Temp: 99.6 °F (37.6 °C)   TempSrc: Tympanic   SpO2: 95%   Weight: (!) 151 kg (332 lb 8 oz)   Height: 6' 3\" (1.905 m)     Body mass index is 41.56 kg/m².     Physical Exam  Vitals and nursing note reviewed.   Constitutional:       Appearance: Normal appearance. He is well-developed.   HENT:      Head: Normocephalic and atraumatic.      Right Ear: Tympanic membrane, ear canal and external ear normal.      Left Ear: Tympanic membrane, ear canal and external ear normal.      Nose: Nose normal.      Mouth/Throat:      Mouth: Mucous membranes are moist.      Pharynx: Uvula midline.   Eyes:      " General: Lids are normal.      Conjunctiva/sclera: Conjunctivae normal.      Pupils: Pupils are equal, round, and reactive to light.   Neck:      Thyroid: No thyroid mass.      Vascular: No JVD.      Trachea: Trachea and phonation normal.   Cardiovascular:      Rate and Rhythm: Normal rate and regular rhythm.      Pulses: Normal pulses.      Heart sounds: Normal heart sounds, S1 normal and S2 normal. No murmur heard.     No friction rub. No gallop.   Pulmonary:      Effort: Pulmonary effort is normal.      Breath sounds: Normal breath sounds.   Abdominal:      General: Bowel sounds are normal.      Palpations: Abdomen is soft.      Tenderness: There is no abdominal tenderness.   Genitourinary:     Comments: Deferred  Musculoskeletal:         General: Normal range of motion.      Cervical back: Full passive range of motion without pain, normal range of motion and neck supple.      Right lower le+ Edema (lymphadema) present.      Left lower le+ Edema (lymphadema) present.   Lymphadenopathy:      Head:      Right side of head: No submental, submandibular, tonsillar, preauricular, posterior auricular or occipital adenopathy.      Left side of head: No submental, submandibular, tonsillar, preauricular, posterior auricular or occipital adenopathy.      Cervical: No cervical adenopathy.   Skin:     General: Skin is warm and dry.      Capillary Refill: Capillary refill takes less than 2 seconds.   Neurological:      General: No focal deficit present.      Mental Status: He is alert and oriented to person, place, and time.      Sensory: Sensation is intact.      Motor: Motor function is intact.      Coordination: Coordination is intact.      Gait: Gait is intact.   Psychiatric:         Attention and Perception: Attention and perception normal.         Mood and Affect: Mood and affect normal.         Speech: Speech normal.         Behavior: Behavior normal. Behavior is cooperative.         Thought Content: Thought  "content normal.         Cognition and Memory: Cognition normal.         Judgment: Judgment normal.           Portions of the record may have been created with voice recognition software. Occasional wrong word or \"sound a like\" substitutions may have occurred due to the inherent limitations of voice recognition software. Read the chart carefully and recognize, using context, where substitutions have occurred. Contact me with any questions.  "

## 2024-02-16 NOTE — PATIENT INSTRUCTIONS
Prediabetes   AMBULATORY CARE:   Prediabetes  is a blood glucose level that is higher than normal. It is not high enough to be considered diabetes. Prediabetes increases your risk for type 2 diabetes and heart disease.   Common symptoms include the following:  Prediabetes may not cause any symptoms, or it may cause symptoms similar to diabetes. These may include any of the following:  More hunger or thirst than usual     Frequent urination     Weight loss without trying     Blurred vision  Contact your healthcare provider if:   You have more hunger or thirst than usual.     You are urinating more frequently than normal.     You lose weight without trying.     You have blurred vision.    You have questions or concerns about your condition or care.  Medicines  may be given to control your blood sugar levels. Medicine may also be given to lower high blood pressure and high cholesterol.   Manage prediabetes:  Weight loss and exercise work best to delay or prevent type 2 diabetes. You can decrease your risk of type 2 diabetes by doing the following:  Lose weight if you are overweight.  A weight loss of 7% of your body weight can help to lower your blood sugar level. For example, if you weigh 200 pounds, you should lose 14 pounds.     Exercise regularly.  Exercise can help decrease your blood sugar level. It can also help to decrease your risk of heart disease and help you lose weight. Adults should exercise for at least 150 minutes every week. Spread this amount of exercise over at least 3 days a week. Do not skip exercise more than 2 days in a row. Children should exercise for at least 60 minutes on most days of the week. Examples of exercise include walking or swimming. Do not sit for longer than 30 minutes. Work with your healthcare provider to create an exercise plan.     Decrease the amount of calories you eat to help you lose weight. Eat a variety of fruits and vegetables, and eat whole-grain foods more often.  Choose dairy foods, meat, and other protein foods that are low in fat. Eat fewer sweets such as candy, cookies, regular soda, and sweetened drinks. You can also decrease calories by eating smaller portion sizes. Work with your healthcare provider or dietitian to develop a meal plan that is right for you.    Do not smoke.  Nicotine can damage blood vessels. Do not use e-cigarettes or smokeless tobacco in place of cigarettes or to help you quit. They still contain nicotine. Ask your healthcare provider for information if you currently smoke and need help quitting.  Follow up with your healthcare provider as directed:  You will need to return every year to get tested for diabetes. Write down your questions so you remember to ask them during your visits.   © 2017 [employer] Information is for End User's use only and may not be sold, redistributed or otherwise used for commercial purposes. All illustrations and images included in CareNotes® are the copyrighted property of [employer]. or [employer].  The above information is an  only. It is not intended as medical advice for individual conditions or treatments. Talk to your doctor, nurse or pharmacist before following any medical regimen to see if it is safe and effective for you.    Meal Planning with Diabetes Exchanges   AMBULATORY CARE:   Diabetes exchanges  are servings of food that contain similar amounts of carbohydrate, fat, protein, and calories within a food group. The exchanges can be used to develop a healthy meal plan that helps to keep your blood sugar within the recommended levels. A meal plan with the right amount of carbohydrates is especially important. Your blood sugar naturally rises after you eat carbohydrates. Too many carbohydrates in 1 meal or snack can raise your blood sugar level. Carbohydrates are found in starches, fruit, milk, yogurt, and sweets.  How to create a meal plan with exchanges:  A  dietitian will work with you to develop a healthy meal plan that is right for you. This meal plan will include the amount of exchanges you can have from each food group throughout the day. Follow your meal plan by keeping track of the amount of exchanges you eat for each meal and snack. Your meal plan will be based on your age, weight, blood sugar levels, medicine, and activity level.   Starch food group exchanges:  Each exchange below contains about 15 grams of carbohydrate , 3 grams of protein, 1 gram of fat, and 80 calories.  1 ounce of white, whole wheat or rye bread (1 slice)    1 ounce of bagel (about ¼ of a bagel)    1 6-inch flour or corn tortilla or 1 4-inch pancake (about ¼ inch thick)    ? cup of cooked pasta or rice    ¾ cup of dry, ready-to-eat cereal with no sugar added     ½ cup of cooked cereal, such as oatmeal    3 jimbo cracker squares or 8 animal crackers    6 saltine-type crackers or     3 cups of popcorn or ¾ ounce of pretzels     Starchy vegetables and cooked legumes:      ½ cup of corn, green peas, sweet potatoes, or mashed potatoes     ¼ of a large baked potato     1 cup of acorn, butternut squash, or pumpkin     ½ cup of beans, lentils, or peas (such as kirkland, kidney, or black-eyed)    ? cup of lima beans  Fruit group exchanges:  Each exchange contains about 15 grams of carbohydrate  and 60 calories.  1 small (4 ounce) apple, banana orange, or nectarine    ½ cup of canned or fresh fruit    ½ cup (4 ounces) of unsweetened fruit juice    2 tablespoons of dried fruit  Milk group exchanges:  Each exchange contains about 12 grams of carbohydrate  and 8 grams of protein. The amount of fat and calories in each serving depends on the type of milk (such as whole, low-fat, or fat-free).  1 cup fat-free or low-fat milk    ¾ cup of plain, nonfat yogurt    1 cup fat-free, flavored yogurt with artificial (no calorie) sweetener  Non-starchy vegetable group exchanges:  Each exchange contains about 5 grams  of carbohydrate , 2 grams of protein, and 25 calories. Examples include beets, broccoli, cabbage, carrots, cauliflower, cucumber, mushrooms, tomatoes, and zucchini.  ½ cup of cooked vegetables or 1 cup of raw vegetables     ½ cup of vegetable juice  Meat and meat substitute group exchanges:  Each exchange of a lean meat  listed below contains about 7 grams of protein, 0 to 3 grams of fat, and 45 calories. The meat and meat substitutes food group does not contain any carbohydrates. Medium and high-fat meats have more calories.  1 ounce of chicken or turkey without skin, or 1 ounce of fish (not breaded or fried)     1 ounce of lean beef, pork, or lamb     1-inch cube or 1 ounce of low-fat cheese     2 egg whites or ¼ cup of egg substitute     ½ cup of tofu  Sweets, desserts, and other carbohydrate group exchanges:   Sweets and other desserts:  Each exchange has about 15 grams of carbohydrate .    1 ounce of genoveva food cake or 2-inch square cake (unfrosted)    2 small cookies     ½ cup of sugar-free, fat-free ice cream    1 tablespoon of syrup, jam, jelly, table sugar, or honey    Combination foods:     1 cup of an entrée, such as lasagna, spaghetti with meatballs, macaroni and cheese, and chili with beans (each serving counts as 2 carbohydrate exchanges )     1 cup of tomato or vegetable beef soup (each serving counts as 1 carbohydrate exchange )  Fat group exchanges:  Each exchange contains 5 grams of fat and 45 calories.  1 teaspoon of oil (such as canola, olive, or corn oil)     6 almonds or cashews, 10 peanuts, or 4 pecan halves     2 tablespoons of avocado     ½ tablespoon of peanut butter     1 teaspoon of regular margarine or 2 teaspoons of low-fat margarine     1 teaspoon of regular butter or 1 tablespoon of low-fat butter     1 teaspoon of regular mayonnaise or 1 tablespoon of low-fat mayonnaise     1 tablespoon of regular salad dressing or 2 tablespoons of low-fat salad dressing  Free foods: The foods on  this list are called free foods because they have very few calories. Free foods usually do not increase your blood sugar if you limit them.  1 tablespoon of catsup or taco sauce     ¼ cup of salsa     2 tablespoons of sugar-free syrup or 2 teaspoons of light jam or jelly     1 tablespoon of fat-free salad dressing     4 tablespoons of fat-free margarine or fat-free mayonnaise     Sugar-free drinks: diet soda, sugar-free drink mixes, or mineral water     Low-sodium bouillon or fat-free broth     Mustard     Seasonings such as spices, herbs, and garlic     Sugar-free gelatin without added fruit  Other healthy nutrition guidelines:   Eat more fiber.  Choose foods that are good sources of fiber, such as fruits, vegetables, and whole grains. Cereals that contain 5 or more grams of fiber per serving are good sources of fiber. Legumes such as garbanzo, kirkland beans, kidney beans, and lentils are also good sources.     Limit fat.  Ask your dietitian or healthcare provider how much fat you should eat each day. Choose foods low in fat, saturated fat, trans fat, and cholesterol. Examples include turkey or chicken without the skin, fish, lean cuts of meat, and beans. Low-fat dairy foods, such as low-fat or fat-free milk and low-fat yogurt are also good choices. Omega-3 fatty acids are healthy fats that are found in canola oil, soybean oil and fatty fish. [place], albacore tuna, and sardines are good sources of omega 3 fatty acids. Eat 2 servings of these types of fish each week. Do not eat fried fish.     Limit sugar.  Sugar and sweets must be counted toward the carbohydrate exchanges that you can have within your meal plan. Limit sugar and sweets because they are usually also high in calories and fat. Eat smaller portions of sweets by sharing a dessert or asking for a child-size portion at a restaurant.    Limit sodium  (salt) to about 2,300 mg per day. You may need to eat even less sodium if you have certain medical conditions.  Foods high in sodium include soy sauce, potato chips, and soup.     Limit alcohol.  Ask your healthcare provider if it is safe for you to drink alcohol. If alcohol is safe for you to have, eat a meal when you drink alcohol. If you drink alcohol on an empty stomach, your blood sugar may drop to a low level. Women should limit alcohol to 1 drink per day. Men should limit alcohol to 2 drinks per day. A drink of alcohol is 5 ounces of wine, 12 ounces of beer, or 1½ ounces of liquor.  Other ways to manage your diabetes:   Control your blood sugar level.  Test your blood sugar level regularly and keep a record of the results. Ask your healthcare provider when and how often to test your blood sugar. You may need to check your blood sugar level at least 3 times each day.     Talk to your healthcare provider about your weight.  Ask if you need to lose weight, and how much you need to lose. If you are overweight, you may need to make other changes to lose weight. Ask your healthcare provider to help you create a weight loss program.     Exercise  can help to control your blood sugar levels and decrease your risk of heart disease. It can also help you lose or maintain your weight. Get at least 30 minutes of exercise, 5 times each week. Do resistance training (using weights) 2 times each week. Do not sit for longer than 90 minutes. Work with your healthcare provider to plan the best exercise program for you.  Contact your healthcare provider if:   You have high blood sugar levels during a certain time of day, or almost all of the time.    You often have low blood sugar levels.    You have questions or concerns about your condition or care.  © 2017 [employer] Information is for End User's use only and may not be sold, redistributed or otherwise used for commercial purposes. All illustrations and images included in CareNotes® are the copyrighted property of [employer] or [employer].  The above  information is an  only. It is not intended as medical advice for individual conditions or treatments. Talk to your doctor, nurse or pharmacist before following any medical regimen to see if it is safe and effective for you.    Hemoglobin A1c   WHAT YOU NEED TO KNOW:   What is a hemoglobin A1c test, and why do I need one?  A hemoglobin A1c is a blood test that measures your average blood sugar level for the past 2 to 3 months. It is also called an HbA1c or glycohemoglobin test. An A1c test can help diagnose prediabetes or diabetes. It can also tell you how well your diabetes plan is working. A1c testing can help your healthcare provider make changes to your treatment plan. These changes can help improve or control your blood sugar levels. Good control of your blood sugar levels can decrease your risk for problems caused by diabetes. Examples include heart attack, stroke, blindness, kidney disease, and neuropathy (nerve problems).   What increases my risk for diabetes?  You may need an A1c test if you have any of the following risk factors for diabetes:  Heart disease    High blood pressure    Polycystic ovarian syndrome    A first-degree relative with diabetes, such as a parent, brother, sister, or child    Being overweight    Lack of exercise or activity     History of gestational diabetes and poor nutrition while pregnant  Who should get an A1c test?   Adults who are overweight and have 1 or more risk factors for diabetes    Adults 45 years of age or older    Children 10 to 18 years who are overweight and have 2 or more risk factors for diabetes    Anyone with signs or symptoms of diabetes, such as increased thirst, slow-healing infections, or increased urination  What do the results of an A1c test mean?  The results are given in percentages.  An A1c of 5.6% or lower means you do not have diabetes.     An A1c of 5.7% to 6.4% means you are at risk for diabetes. This is also called prediabetes.     An  A1c of 6.5% or higher means you have diabetes.     If you currently have diabetes in good control, your A1c goal may be 7% or lower. Your healthcare provider will decide what your goal should be. This decision is based on your diabetes history and other medical conditions. You may need an A1c test 2 to 4 times each year, depending on your blood sugar level control.  How should I prepare for the test?  You do not need to do anything to prepare for the test. Wear a short-sleeved or loose shirt to the test. This will make it easier to draw your blood. Other tests may be needed to diagnose or monitor diabetes if you have certain conditions. Tell your healthcare provider if you have any of the following:  Iron-deficiency or [id number] anemia    Cystic fibrosis    Recent heavy blood loss or a blood transfusion    Recent erythropoietin therapy    Sickle cell disease or thalassemia    Kidney failure or liver disease  What will happen after the test?  Schedule a follow-up appointment with your healthcare provider to talk about your test results.  If your A1c is lower than your goal , your medicines may be changed.     If your A1c is at your goal , you may not need any changes to your diabetes treatment plan.     If your A1c is higher than your goal , you may need changes to your medicines, eating plan, or exercise plan.  What else should I know about an A1c test?   You may get an estimated Average Glucose (eAG) with your A1c results. The eAG gives your A1c result in numbers like you see on your glucose meter. For example, an A1c of 6% will be reported as an eAG of 126 mg/dL. This means your average blood sugar level was 126 mg/dL over the last 2 to 3 months. The eAG can help you understand if your A1c result is on target for what your healthcare provider recommends.     You are at risk for an uncommon type of hemoglobin if you are , Mediterranean, or [place] . This type of hemoglobin can affect your A1c  test results. Tell your healthcare provider if you come from any of these backgrounds. You may need to have your A1c sent to a lab with certain equipment. This will help make sure your results are accurate.  CARE AGREEMENT:   You have the right to help plan your care. To help with this plan, you must learn about your lab tests. You can then discuss the results with your caregivers. Work with them to decide what care may be used to treat you. You always have the right to refuse treatment. The above information is an  only. It is not intended as medical advice for individual conditions or treatments. Talk to your doctor, nurse or pharmacist before following any medical regimen to see if it is safe and effective for you.  © 2017 [facility] Information is for End User's use only and may not be sold, redistributed or otherwise used for commercial purposes. All illustrations and images included in CareNotes® are the copyrighted property of [employer]. or [employer]. CARBOHYDRATES  As a carbohydrate is digested through our bodies it becomes a sugar.      There are TWO main things I want you to know about CARBOHYDRATES:     1. NUMBER     How many carbohydrates are in each serving of food you are about to eat matters to weight loss/gain, diabetes control, and sugar levels.  A food product is in the LOW carbohydrate level if it has less than 15grams carbohydrates per serving.  These are always good choices in general for a snack.  A meal can include anywhere from 15-45 grams carbohydrates in the meal.      TIP: Sugar Free foods contain carbohydrates which become sugar in the body.  If you are going to consider eating sugar free foods, you MUST follow serving size carefully.  These foods will still result in high glucose levels.      2. TYPE     The type of carbohydrate is VERY important.  A slice of bread (white or wheat) will have same amount of carbohydrates but both break  down at different paces in the body.  Carbohydrates that are more complex like Rye, whole wheat, and multi grain process slower through our bodies, therefore, making sugar levels rise slowly and steady and over long period of time.  WHITE carbohydrates such as white bread, white pasta and white rice digest quickly in your body and raise your glucose levels fast just like eating a candy bar.     TIP: Read the INGREDIENTS on each item to make sure the first ingredient is WHOLE WHEAT or Rye  (not enriched white flour) rather than reading front label of the product in order to verify whole wheat product.             Simple carbohydrates digest through your body QUICKLY causing the conversion of carbohydrates to become sugars if you are not using them immediately for energy.  Complex carbohydrates will become sugars SLOWLY over time as your body breaks them down in your digestive system.  Below is a graph demonstrating these two concepts.  The rate of sugar breakdown can easily affect your energy, metabolism, ability to gain or lose weight and more.  The goal is a slow and steady release of sugar into your body and to avoid sugar spikes in order to feel your best and manage your health.        Simple Carbohydrates: Candy bar, white bread, white pasta, white rice, white flour, cookies, sweets, cakes, corn, string beans etc               A little information about carbs ...   ·         Try for a low carbohydrate diet. This means less than 100 grams per day.   ·         Try to get a high amount of protein per day - shoot for 60-70 grams per day.  ·         To really lose weight you may need to try an ultra low carb diet - this means 10-15 grams per meal. And 5 - 10 grams per snack. This is usually under the supervision of a physician weight loss program.   ·         Carbohydrates are in starches and turn to sugars. Lower your intake of bread, pasta, potatoes, rice. Never drink your carbs - switch to water. No juice or soda.    ·         When you do consume carbs, try for high fiber choices like fruits and veggies. Try for complex carbohydrates found in oats and whole grains. A daily fiber supplement can also be helpful.      Here is a lot more information …     Understanding Carbohydrates  A car needs the right type of fuel to run. And you need the right kind of food to function. To keep your energy level up, your body needs food that has carbohydrates. But carbohydrates raise blood sugar levels higher and faster than other kinds of food.      Starches - AVOID   Starches are found in grains, some vegetables, and beans. Grain products include bread, pasta, cereal, and tortillas. Starchy vegetables include potatoes, peas, corn, lima beans, yams, and squash. Kidney beans, kirkland beans, black beans, garbanzo beans, and lentils also contain starches.  Sugars - AVOID  Sugars are found naturally in many foods. Or sugar can be added. Foods that contain natural sugar include fruits and fruit juices, dairy products, honey, and molasses. Added sugars are found in most desserts, processed foods, candy, regular soda, and fruit drinks. These are very helpful for treating low blood sugar, or hypoglycemia. They provide sugar quickly.  Fiber - A BETTER CHOICE  Fiber comes from plant foods. Most fiber isn’t digested by the body. Instead of raising blood sugar levels like other carbohydrates, it actually stops blood sugar from rising too fast. Fiber is found in fruits, vegetables, whole grains, beans, peas, and many nuts.  Carb counting  It’s important to keep track of the amount of carbohydrates you eat. This can help you keep the right balance of physical activity and medicine. The amount of carbohydrates needed will vary for each person. It depends on many things such as your health, the medicines you take, and how active you are. You may start with around 45 to 60 grams of carbohydrate per meal, depending on your situation. Counting your carbohydrates  is a good way to control how many you eat. You can do this by keeping a food diary. There are great apps to help with this too like My Fitness Pal.      Carbohydrates come from a variety of foods. These include grains, starchy vegetables, fruit, milk, beans, and snack foods. You can either count carbohydrate grams or carbohydrate servings. When you count carbohydrate servings, 1 carbohydrate serving = 15 grams of carbohydrates.     Here are some examples of foods containing about 15 grams of carbohydrates (1 serving of carbohydrates):  ·      1/2 cup of canned or frozen fruit  ·      A small piece of fresh fruit (4 ounces)  ·      1 slice of bread  ·      1/2 cup of oatmeal  ·      1/3 cup of rice  ·      4 to 6 crackers  ·      1/2 English muffin  ·      1/2 cup of black beans  ·      1/4 of a large baked potato (3 ounces)  ·      2/3 cup of plain fat-free yogurt  ·      1 cup of soup  ·      1/2 cup of casserole  ·      6 chicken nuggets  ·      2-inch square brownie or cake without frosting  ·      2 small cookies  ·      1/2 cup of ice cream or sherbet     Carb counting is easier when food labels are available. Look at the label to see how many grams of total carbohydrates the food contains. Then you can figure out how much you should eat.  It’s also important to be consistent with the amount and time you eat when taking a fixed dose of diabetes medicine.     Make your meal plan  A meal plan gives guidelines for the types and amounts of food you should eat.  Watch serving sizes  Your meal plan will group foods by servings. To learn how much a serving is, start by measuring food portions at each meal. Soon you’ll know what a serving looks like on your plate. A quick rule is a serving size of meat is about the size of your fist.   Eat from all the food groups  The basis of a healthy meal plan is variety (eating lots of different foods). Choose lean meats, fresh fruits and vegetables, whole grains, and low-fat or  nonfat dairy products. Eating a wide variety of foods provides the nutrients your body needs. It can also keep you from getting bored with your meal plan.  Learn about carbohydrates, fats, and protein  ·      Carbohydrates are starches, sugars, and fiber. They are found in many foods, including fruit, bread, pasta, milk, and sweets. Of all the foods you eat, carbohydrates have the most effect on your blood sugar.   ·      Fats have the most calories. They also have the most effect on your weight and your risk of heart disease. It’s important to control your weight and protect your heart. Foods that are high in fat include whole milk, cheese, snack foods, and desserts.  ·      Protein is important for building and repairing muscles and bones. Choose low-fat protein sources, such as fish, egg whites, and skinless chicken.  Reduce liquid sugars  Extra calories from sodas, sports drinks, and fruit drinks make it hard to keep blood sugar in range. Cut as many liquid sugars from your meal plan as you can.  This includes most fruit juices, which are often high in natural or added sugar. Instead, drink plenty of water and other sugar-free beverages.  Eat less fat  If you need to lose weight, try to reduce the amount of fat in your diet. This can also help lower your cholesterol level to keep blood vessels healthier. Cut fat by using only small amounts of liquid oil for cooking. Read food labels carefully to avoid foods with unhealthy trans fats.  Timing your meals  When it comes to blood sugar control, when you eat is as important as what you eat. You may need to eat several small meals spaced evenly throughout the day to stay in your target range. So don’t skip breakfast or wait until late in the day to get most of your calories. Doing so can cause your blood sugar to rise too high or fall too low.         Understanding Carbohydrates, Fats, and Protein  Food is a source of fuel and nourishment for your body. It’s also a  source of pleasure. Having diabetes doesn’t mean you have to eat special foods or give up desserts. Instead, your dietitian can show you how to plan meals to suit your body. To start, learn how different foods affect blood sugar.     Carbohydrates  Carbohydrates are the main source of fuel for the body. Carbohydrates raise blood sugar. Many people think carbohydrates are only found in pasta or bread. But carbohydrates are actually in many kinds of foods:  ·      Sugars occur naturally in foods such as fruit, milk, honey, and molasses. Sugars can also be added to many foods, from cereals and yogurt to candy and desserts. Sugars raise blood sugar.  ·      Starches are found in bread, cereals, pasta, and dried beans. They’re also found in corn, peas, potatoes, yam, acorn squash, and butternut squash. Starches also raise blood sugar.   ·      Fiber is found in foods such as vegetables, fruits, beans, and whole grains. Unlike other carbs, fiber isn’t digested or absorbed. So it doesn’t raise blood sugar. In fact, fiber can help keep blood sugar from rising too fast. It also helps keep blood cholesterol at a healthy level.     Did you know?  Even though carbohydrates raise blood sugar, it’s best to have some in every meal. They are an important part of a healthy diet.      Fat  Fat is an energy source that can be stored until needed. Fat does not raise blood sugar. However, it can raise blood cholesterol, increasing the risk of heart disease. Fat is also high in calories, which can cause weight gain. Not all types of fat are the same.  More Healthy:  ·      Monounsaturated fats are mostly found in vegetable oils, such as olive, canola, and peanut oils. They are also found in avocados and some nuts. Monounsaturated fats are healthy for your heart. That’s because they lower LDL (unhealthy) cholesterol.  ·      Polyunsaturated fats are mostly found in vegetable oils, such as corn, safflower, and soybean oils. They are also  found in some seeds, nuts, and fish. Polyunsaturated fats lower LDL (unhealthy) cholesterol. So, choosing them instead of saturated fats is healthy for your heart. Certain unsaturated fats can help lower triglycerides.   Less Healthy:  ·      Saturated fats are found in animal products, such as meat, poultry, whole milk, lard, and butter. Saturated fats raise LDL cholesterol and are not healthy for your heart.  ·      Hydrogenated oils and trans fats are formed when vegetable oils are processed into solid fats. They are found in many processed foods. Hydrogenated oils and trans fats raise LDL cholesterol and lower HDL (healthy) cholesterol. They are not healthy for your heart.  Protein  Protein helps the body build and repair muscle and other tissue. Protein has little or no effect on blood sugar. However, many foods that contain protein also contain saturated fat. By choosing low-fat protein sources, you can get the benefits of protein without the extra fat:  ·      Plant protein is found in dry beans and peas, nuts, and soy products, such as tofu and soymilk. These sources tend to be cholesterol-free and low in saturated fat.  ·      Animal protein is found in fish, poultry, meat, cheese, milk, and eggs. These contain cholesterol and can be high in saturated fat. Aim for lean, lower-fat choices.     Reading food labels  Pay close attention to food labels. The information on them will help you choose healthy foods that make managing your blood sugar easier. Look for the Nutrition Facts label on packaged foods. This label tells you how many carbohydrates and how much sugar, fat, and fiber are in each serving. Then you can decide whether the food fits your meal plan.      Reading food labels helps you keep track of your carbohydrate intake. Remember to pay attention to serving sizes. If you eat this entire box, he will have eaten 34 grams of carbohydrate.   ·      Serving size: This number is very important and tells  you how much of the food makes up a single serving. If you eat more than one serving, all other numbers, like calories and carbohydrates, also increase.  ·      Total carbohydrates: This number tells you how much carbohydrate is in each serving. If you are carb counting, this number will help you fit the food into your meal plan. Also, keep in mind the number of servings you eat. If  you eat two servings, you’ll need to double the amount of carbohydrates listed on the box.   ·      Sugars: This number includes both natural and added sugars. Sugars count as part of your carbohydrate intake, and are included in the total carbohydrate number on the label.   ·      Fat: This number tells you the total amount of fat in each serving. Watch out for saturated fats, which raise cholesterol. Limit fats, especially if you are trying to lose weight.  ·      Trans fat: This number tells you if the food includes trans fat. Liquid oils made into a solid fat, such as margarine, have a lot of trans fat. Trans fat is bad for the heart. Try to avoid foods containing trans fat.  ·      Dietary fiber: This number tells you how much of the carbohydrate in the food is fiber. Foods high in fiber are healthy. They also help keep blood sugar levels steady.  Learning portion sizes  Portion control is an important part of healthy eating. How much food you eat affects your blood sugar.  And until you learn what portion sizes look like, use measuring cups and spoons to be sure portions are accurate.     Adapted from:  © 0598-8345 [employer]. [address] [place] [address], [place] [address]. All rights reserved. This information is not intended as a substitute for professional medical care. Always follow your healthcare professional's instructions.

## 2024-02-17 PROCEDURE — 93000 ELECTROCARDIOGRAM COMPLETE: CPT | Performed by: NURSE PRACTITIONER

## 2024-02-21 DIAGNOSIS — M10.9 GOUT, UNSPECIFIED CAUSE, UNSPECIFIED CHRONICITY, UNSPECIFIED SITE: ICD-10-CM

## 2024-02-21 RX ORDER — ALLOPURINOL 100 MG/1
100 TABLET ORAL DAILY
Qty: 90 TABLET | Refills: 1 | Status: SHIPPED | OUTPATIENT
Start: 2024-02-21

## 2024-04-25 ENCOUNTER — TELEPHONE (OUTPATIENT)
Age: 49
End: 2024-04-25

## 2024-05-13 ENCOUNTER — OFFICE VISIT (OUTPATIENT)
Dept: URGENT CARE | Facility: CLINIC | Age: 49
End: 2024-05-13
Payer: COMMERCIAL

## 2024-05-13 VITALS
DIASTOLIC BLOOD PRESSURE: 106 MMHG | TEMPERATURE: 97.6 F | RESPIRATION RATE: 18 BRPM | SYSTOLIC BLOOD PRESSURE: 186 MMHG | OXYGEN SATURATION: 96 % | HEART RATE: 79 BPM

## 2024-05-13 DIAGNOSIS — R10.9 FLANK PAIN: ICD-10-CM

## 2024-05-13 DIAGNOSIS — N39.0 URINARY TRACT INFECTION WITHOUT HEMATURIA, SITE UNSPECIFIED: Primary | ICD-10-CM

## 2024-05-13 DIAGNOSIS — I10 ELEVATED BLOOD PRESSURE READING IN OFFICE WITH DIAGNOSIS OF HYPERTENSION: ICD-10-CM

## 2024-05-13 LAB
SL AMB  POCT GLUCOSE, UA: NEGATIVE
SL AMB LEUKOCYTE ESTERASE,UA: ABNORMAL
SL AMB POCT BILIRUBIN,UA: NEGATIVE
SL AMB POCT BLOOD,UA: ABNORMAL
SL AMB POCT CLARITY,UA: ABNORMAL
SL AMB POCT COLOR,UA: ABNORMAL
SL AMB POCT KETONES,UA: NEGATIVE
SL AMB POCT NITRITE,UA: NEGATIVE
SL AMB POCT PH,UA: 6.5
SL AMB POCT SPECIFIC GRAVITY,UA: 1.01
SL AMB POCT URINE PROTEIN: ABNORMAL
SL AMB POCT UROBILINOGEN: 0.2

## 2024-05-13 PROCEDURE — 87086 URINE CULTURE/COLONY COUNT: CPT | Performed by: NURSE PRACTITIONER

## 2024-05-13 PROCEDURE — 87186 SC STD MICRODIL/AGAR DIL: CPT | Performed by: NURSE PRACTITIONER

## 2024-05-13 PROCEDURE — 87077 CULTURE AEROBIC IDENTIFY: CPT | Performed by: NURSE PRACTITIONER

## 2024-05-13 PROCEDURE — G0383 LEV 4 HOSP TYPE B ED VISIT: HCPCS | Performed by: NURSE PRACTITIONER

## 2024-05-13 PROCEDURE — 81002 URINALYSIS NONAUTO W/O SCOPE: CPT | Performed by: NURSE PRACTITIONER

## 2024-05-13 RX ORDER — CIPROFLOXACIN 500 MG/1
500 TABLET, FILM COATED ORAL EVERY 12 HOURS SCHEDULED
Qty: 14 TABLET | Refills: 0 | Status: SHIPPED | OUTPATIENT
Start: 2024-05-13 | End: 2024-05-21

## 2024-05-13 NOTE — PROGRESS NOTES
"  [facility]        NAME: Rodrigo Lemons is a 48 y.o. male  : 1975    MRN: [id number]  DATE: May 13, 2024  TIME: 2:58 PM    Assessment and Plan   Urinary tract infection without hematuria, site unspecified [N39.0]  1. Urinary tract infection without hematuria, site unspecified  POCT urine dip    Urine culture    ciprofloxacin (CIPRO) 500 mg tablet      2. Flank pain  ciprofloxacin (CIPRO) 500 mg tablet      3. Elevated blood pressure reading in office with diagnosis of hypertension  ciprofloxacin (CIPRO) 500 mg tablet            Patient Instructions       Follow up with PCP in 3-5 days.  Proceed to  ER if symptoms worsen.    If tests have been performed at [facility], our office will contact you with results if changes need to be made to the care plan discussed with you at the visit.  You can review your full results on [facility].    Your urine shows bacteria.  You have been prescribed an antibiotic. You are to take all medication as prescribed.   You are to avoid alcohol and caffeine - they are bladder irritants.  Drink water.  Take tylenol or motrin for pain or fever.    You are to download [facility] mychart for the results in 3-5 days.   You will be notified if the antibiotic needs changed.  Follow up with your PCP in 2-3 days.   Go to the ED if symptoms worsen.      If tests have been performed at [facility], our office will contact you with results if changes need to be made to the care plan discussed with you at the visit.  You can review your full results on [facility].    You are not to take your multi vitamins with in 2 hours of taking this medication.  If you develop arm or leg pain stop this medication immediately and call your doctor  Do not take steroids while taking this medication        Chief Complaint     Chief Complaint   Patient presents with    Urinary Tract Infection     C/o dysuria, urinary frequency, bilateral flank pain, and chills onset \"3 days\". Denies assessing for " "fever. Denies any visible blood in urine. Denies contacting urology or PCP, states \"I saw urology like 3 years ago for UTI before\". Denies ASA/anticoag.          History of Present Illness       This is a 48 year old male who states has had urinary symptoms with dysuria, bilateral flank pain and chills x 3 days.  He denies fevers, chills, n/v/d.  States last saw urology 3 yrs ago for epididymitis.  He states that he did take something for his headache which would have been for the pain as well.   He also states has been diagnosed with HTN x 2 months ago and has not picked up his blood pressure meds. Discussed elevated blood pressure with patient.     Urinary Tract Infection   Associated symptoms include flank pain, frequency and urgency. Pertinent negatives include no hematuria.       Review of Systems   Review of Systems   Constitutional: Negative.    HENT: Negative.     Respiratory: Negative.     Cardiovascular: Negative.    Gastrointestinal: Negative.    Endocrine: Negative.    Genitourinary:  Positive for dysuria, flank pain, frequency and urgency. Negative for hematuria, penile discharge, penile pain, penile swelling, scrotal swelling and testicular pain.   Skin: Negative.    Allergic/Immunologic: Negative.    Neurological: Negative.    Hematological: Negative.    Psychiatric/Behavioral: Negative.           Current Medications       Current Outpatient Medications:     allopurinol (ZYLOPRIM) 100 mg tablet, Take 1 tablet (100 mg total) by mouth daily, Disp: 90 tablet, Rfl: 1    cholecalciferol (VITAMIN D3) 400 units tablet, Take 1,000 Units by mouth daily, Disp: , Rfl:     ciprofloxacin (CIPRO) 500 mg tablet, Take 1 tablet (500 mg total) by mouth every 12 (twelve) hours for 7 days, Disp: 14 tablet, Rfl: 0    levothyroxine 200 mcg tablet, Take 1 tablet (200 mcg total) by mouth daily, Disp: 90 tablet, Rfl: 1    levothyroxine 25 mcg tablet, Take 1 tablet (25 mcg total) by mouth daily, Disp: 90 tablet, Rfl: 1    " Multiple Vitamins-Minerals (multivitamin with minerals) tablet, Take 1 tablet by mouth daily, Disp: , Rfl:     oxyCODONE (ROXICODONE) 15 mg immediate release tablet, , Disp: , Rfl:     XTAMPZA ER 36 MG C12A, , Disp: , Rfl:     amLODIPine (NORVASC) 5 mg tablet, Take 1 tablet (5 mg total) by mouth daily (Patient not taking: Reported on 5/13/2024), Disp: 30 tablet, Rfl: 1    ibuprofen (MOTRIN) 800 mg tablet, Take 800 mg by mouth every 6 (six) hours as needed for mild pain, Disp: , Rfl:     sildenafil (REVATIO) 20 mg tablet, Take 1 tablet (20 mg total) by mouth daily as needed (Erectile dysfunction) (Patient not taking: Reported on 2/16/2024), Disp: 30 tablet, Rfl: 6    Current Allergies     Allergies as of 05/13/2024    (No Known Allergies)            The following portions of the patient's history were reviewed and updated as appropriate: allergies, current medications, past family history, past medical history, past social history, past surgical history and problem list.     Past Medical History:   Diagnosis Date    Chronic pain disorder     Disease of thyroid gland     Nausea & vomiting 1/2/2020       Past Surgical History:   Procedure Laterality Date    APPENDECTOMY      RESOLVED: 2000    CHOLECYSTECTOMY      RESOLVED: 2004    CHOLECYSTECTOMY      LUMBAR LAMINECTOMY      UMBILICAL HERNIA REPAIR      RESOLVED: 2013    UMBILICAL HERNIA REPAIR         Family History   Problem Relation Age of Onset    Cirrhosis Mother         HEPATIC    Cirrhosis Father         HEPATIC         Medications have been verified.        Objective   BP (!) 186/106 (BP Location: Left arm, Patient Position: Sitting, Cuff Size: Large)   Pulse 79   Temp 97.6 °F (36.4 °C) (Temporal)   Resp 18   SpO2 96%   No LMP for male patient.       Physical Exam     Physical Exam  Vitals and nursing note reviewed.   Constitutional:       General: He is not in acute distress.     Appearance: Normal appearance. He is obese. He is not ill-appearing,  toxic-appearing or diaphoretic.   HENT:      Head: Normocephalic and atraumatic.      Mouth/Throat:      Mouth: Mucous membranes are moist.   Eyes:      Extraocular Movements: Extraocular movements intact.   Cardiovascular:      Rate and Rhythm: Normal rate and regular rhythm.      Pulses: Normal pulses.      Heart sounds: Normal heart sounds. No murmur heard.  Pulmonary:      Effort: Pulmonary effort is normal. No respiratory distress.      Breath sounds: Normal breath sounds. No stridor. No wheezing, rhonchi or rales.   Chest:      Chest wall: No tenderness.   Abdominal:      Tenderness: There is right CVA tenderness and left CVA tenderness.   Musculoskeletal:         General: Normal range of motion.      Cervical back: Normal range of motion and neck supple.   Skin:     General: Skin is warm and dry.      Capillary Refill: Capillary refill takes less than 2 seconds.   Neurological:      General: No focal deficit present.      Mental Status: He is alert and oriented to person, place, and time.   Psychiatric:         Mood and Affect: Mood normal.         Behavior: Behavior normal.         Thought Content: Thought content normal.         Judgment: Judgment normal.         Poct urine  large leuks, non hem tr mod blood   SG 1.010  trace protein   Will send for cx

## 2024-05-13 NOTE — PATIENT INSTRUCTIONS
Your urine shows bacteria.  You have been prescribed an antibiotic. You are to take all medication as prescribed.   You are to avoid alcohol and caffeine - they are bladder irritants.  Drink water.  Take tylenol or motrin for pain or fever.    You are to download [facility] mychart for the results in 3-5 days.   You will be notified if the antibiotic needs changed.  Follow up with your PCP in 2-3 days.   Go to the ED if symptoms worsen.      If tests have been performed at [facility], our office will contact you with results if changes need to be made to the care plan discussed with you at the visit.  You can review your full results on [facility].    You are not to take your multi vitamins with in 2 hours of taking this medication.  If you develop arm or leg pain stop this medication immediately and call your doctor  Do not take steroids while taking this medication

## 2024-05-15 LAB — BACTERIA UR CULT: ABNORMAL

## 2024-05-15 NOTE — RESULT ENCOUNTER NOTE
Urine Culture   80,000-89,000 cfu/ml Klebsiella pneumoniae Abnormal   Pt prescribed Cipro.  Waiting on sensitivities.

## 2024-05-15 NOTE — RESULT ENCOUNTER NOTE
Urine Culture   80,000-89,000 cfu/ml Klebsiella pneumoniae Abnormal   Pt was prescribed cipro which is susceptible.      Please have your office call patient for follow up

## 2024-05-21 ENCOUNTER — HOSPITAL ENCOUNTER (OUTPATIENT)
Dept: GASTROENTEROLOGY | Facility: HOSPITAL | Age: 49
Setting detail: OUTPATIENT SURGERY
Discharge: HOME | End: 2024-05-21
Attending: INTERNAL MEDICINE
Payer: COMMERCIAL

## 2024-05-21 ENCOUNTER — ANESTHESIA EVENT (OUTPATIENT)
Dept: GASTROENTEROLOGY | Facility: HOSPITAL | Age: 49
End: 2024-05-21

## 2024-05-21 ENCOUNTER — ANESTHESIA (OUTPATIENT)
Dept: GASTROENTEROLOGY | Facility: HOSPITAL | Age: 49
End: 2024-05-21

## 2024-05-21 VITALS
OXYGEN SATURATION: 95 % | SYSTOLIC BLOOD PRESSURE: 123 MMHG | RESPIRATION RATE: 18 BRPM | HEART RATE: 70 BPM | TEMPERATURE: 98 F | DIASTOLIC BLOOD PRESSURE: 65 MMHG

## 2024-05-21 DIAGNOSIS — Z12.11 SCREENING FOR COLON CANCER: ICD-10-CM

## 2024-05-21 PROCEDURE — G0121 COLON CA SCRN NOT HI RSK IND: HCPCS | Performed by: INTERNAL MEDICINE

## 2024-05-21 RX ORDER — SODIUM CHLORIDE, SODIUM LACTATE, POTASSIUM CHLORIDE, CALCIUM CHLORIDE 600; 310; 30; 20 MG/100ML; MG/100ML; MG/100ML; MG/100ML
125 INJECTION, SOLUTION INTRAVENOUS CONTINUOUS
Status: DISCONTINUED | OUTPATIENT
Start: 2024-05-21 | End: 2024-05-25 | Stop reason: HOSPADM

## 2024-05-21 RX ORDER — LIDOCAINE HYDROCHLORIDE 10 MG/ML
INJECTION, SOLUTION EPIDURAL; INFILTRATION; INTRACAUDAL; PERINEURAL AS NEEDED
Status: DISCONTINUED | OUTPATIENT
Start: 2024-05-21 | End: 2024-05-21

## 2024-05-21 RX ORDER — SODIUM CHLORIDE, SODIUM LACTATE, POTASSIUM CHLORIDE, CALCIUM CHLORIDE 600; 310; 30; 20 MG/100ML; MG/100ML; MG/100ML; MG/100ML
INJECTION, SOLUTION INTRAVENOUS CONTINUOUS PRN
Status: DISCONTINUED | OUTPATIENT
Start: 2024-05-21 | End: 2024-05-21

## 2024-05-21 RX ORDER — PROPOFOL 10 MG/ML
INJECTION, EMULSION INTRAVENOUS CONTINUOUS PRN
Status: DISCONTINUED | OUTPATIENT
Start: 2024-05-21 | End: 2024-05-21

## 2024-05-21 RX ORDER — PROPOFOL 10 MG/ML
INJECTION, EMULSION INTRAVENOUS AS NEEDED
Status: DISCONTINUED | OUTPATIENT
Start: 2024-05-21 | End: 2024-05-21

## 2024-05-21 RX ADMIN — PROPOFOL 200 MG: 10 INJECTION, EMULSION INTRAVENOUS at 12:07

## 2024-05-21 RX ADMIN — SODIUM CHLORIDE, SODIUM LACTATE, POTASSIUM CHLORIDE, AND CALCIUM CHLORIDE 125 ML/HR: .6; .31; .03; .02 INJECTION, SOLUTION INTRAVENOUS at 10:29

## 2024-05-21 RX ADMIN — PROPOFOL 110 MCG/KG/MIN: 10 INJECTION, EMULSION INTRAVENOUS at 12:10

## 2024-05-21 RX ADMIN — LIDOCAINE HYDROCHLORIDE 50 MG: 10 INJECTION, SOLUTION EPIDURAL; INFILTRATION; INTRACAUDAL; PERINEURAL at 12:07

## 2024-05-21 RX ADMIN — SODIUM CHLORIDE, SODIUM LACTATE, POTASSIUM CHLORIDE, AND CALCIUM CHLORIDE: .6; .31; .03; .02 INJECTION, SOLUTION INTRAVENOUS at 11:57

## 2024-05-21 NOTE — DISCHARGE INSTRUCTIONS
..Colonoscopy   WHAT YOU NEED TO KNOW:   A colonoscopy is a procedure to examine the inside of your colon (intestine) with a scope. Polyps or tissue growths may have been removed during your colonoscopy. It is normal to feel bloated and to have some abdominal discomfort. You should be passing gas. If you have hemorrhoids or you had polyps removed, you may have a small amount of bleeding.        DISCHARGE INSTRUCTIONS:   Seek care immediately if:   You have sudden, severe abdominal pain.     You have problems swallowing.     You have a large amount of black, sticky bowel movements or blood in your bowel movements.     You have sudden trouble breathing.     You feel weak, lightheaded, or faint or your heart beats faster than normal for you.     Contact your healthcare provider if:   You have a fever and chills.      You have nausea or are vomiting.      Your abdomen is bloated or feels full and hard.     You have abdominal pain.   You have black, sticky bowel movements or blood in your bowel movements.  You have not had a bowel movement for 3 days after your procedure.  You have rash or hives.  You have questions or concerns about your procedure.    Activity:   Do not lift, strain, or run for 24 hours after your procedure.     Rest after your procedure. You have been given medicine to relax you. Do not drive or make important decisions until the day after your procedure. Return to your normal activity as directed.     Relieve gas and discomfort from bloating by lying on your right side with a heating pad on your abdomen. You may need to take short walks to help the gas move out. Eat small meals until bloating is relieved.  Follow up with your healthcare provider as directed: Write down your questions so you remember to ask them during your visits.     If you take a “blood thinner”, please review the specific instructions from your endoscopist about when you should resume it. These can be found in the “Recommendation”  and “Your Medication list” sections of this After Visit Summary.

## 2024-05-21 NOTE — ANESTHESIA POSTPROCEDURE EVALUATION
Post-Op Assessment Note    CV Status:  Stable  Pain Score: 0    Pain management: adequate       Mental Status:  Sleepy   Hydration Status:  Euvolemic   PONV Controlled:  Controlled   Airway Patency:  Patent     Post Op Vitals Reviewed: Yes    No anethesia notable event occurred.    Staff: Anesthesiologist               BP   111/60   Temp   98.0   Pulse 69   Resp 13   SpO2 95

## 2024-05-21 NOTE — H&P
History and Physical -  [facility]  Rodrigo Lemons 48 y.o. male MRN: [id number]                  HPI: Rodrigo Lemons is a 48 y.o. year old male who presents for Colonoscopy for colon cancer screening.       REVIEW OF SYSTEMS: Per the HPI, and otherwise unremarkable.    Historical Information   Past Medical History:   Diagnosis Date    Chronic pain disorder     Disease of thyroid gland     Hypertension     Lymphedema     bi-lat extremities    Nausea & vomiting 01/02/2020     Past Surgical History:   Procedure Laterality Date    APPENDECTOMY      RESOLVED: 2000    CHOLECYSTECTOMY      RESOLVED: 2004    CHOLECYSTECTOMY      LUMBAR LAMINECTOMY      UMBILICAL HERNIA REPAIR      RESOLVED: 2013    UMBILICAL HERNIA REPAIR       Social History   Social History     Substance and Sexual Activity   Alcohol Use No     Social History     Substance and Sexual Activity   Drug Use No     Social History     Tobacco Use   Smoking Status Former    Passive exposure: Past   Smokeless Tobacco Never     Family History   Problem Relation Age of Onset    Cirrhosis Mother         HEPATIC    Cirrhosis Father         HEPATIC       Meds/Allergies     Not in a hospital admission.    No Known Allergies    Objective     Blood pressure 159/80, pulse 82, temperature (!) 97.3 °F (36.3 °C), temperature source Temporal, resp. rate 16, SpO2 96%.      PHYSICAL EXAM    Gen: NAD  CV: RRR  CHEST: Clear  ABD: soft, NT/ND  EXT: no edema      ASSESSMENT/PLAN:  Rodrigo Lemons is a 48 y.o. year old male who presents for Colonoscopy for colon cancer screening.  The patient is stable and optimized for the procedure, we reviewed risk and benefits. Risk include but not limited to infection, bleeding, perforation and missing a lesion.

## 2024-08-21 DIAGNOSIS — M10.9 GOUT, UNSPECIFIED CAUSE, UNSPECIFIED CHRONICITY, UNSPECIFIED SITE: ICD-10-CM

## 2024-08-21 DIAGNOSIS — I10 PRIMARY HYPERTENSION: ICD-10-CM

## 2024-08-21 RX ORDER — AMLODIPINE BESYLATE 5 MG/1
5 TABLET ORAL DAILY
Qty: 30 TABLET | Refills: 0 | Status: SHIPPED | OUTPATIENT
Start: 2024-08-21 | End: 2024-08-22

## 2024-08-21 RX ORDER — ALLOPURINOL 100 MG/1
100 TABLET ORAL DAILY
Qty: 90 TABLET | Refills: 0 | Status: SHIPPED | OUTPATIENT
Start: 2024-08-21

## 2024-08-21 NOTE — TELEPHONE ENCOUNTER
Reason for call:   [x] Refill   [] Prior Auth  [] Other:     Office:   [x] PCP/Provider - MISTY Cornelius   [] Specialty/Provider -     Medication:     Does the patient have enough for 3 days?   [] Yes   [x] No - Send as HP to POD

## 2024-08-22 DIAGNOSIS — I10 PRIMARY HYPERTENSION: ICD-10-CM

## 2024-08-22 RX ORDER — AMLODIPINE BESYLATE 5 MG/1
5 TABLET ORAL DAILY
Qty: 90 TABLET | Refills: 1 | Status: SHIPPED | OUTPATIENT
Start: 2024-08-22

## 2024-08-22 RX ORDER — AMLODIPINE BESYLATE 5 MG/1
5 TABLET ORAL DAILY
Qty: 30 TABLET | Refills: 1 | Status: SHIPPED | OUTPATIENT
Start: 2024-08-22 | End: 2024-08-22

## 2024-10-30 DIAGNOSIS — E03.9 ACQUIRED HYPOTHYROIDISM: ICD-10-CM

## 2024-10-30 RX ORDER — LEVOTHYROXINE SODIUM 25 UG/1
25 TABLET ORAL DAILY
Qty: 90 TABLET | Refills: 1 | Status: SHIPPED | OUTPATIENT
Start: 2024-10-30

## 2024-10-30 NOTE — TELEPHONE ENCOUNTER
Reason for call:   [x] Refill   [] Prior Auth  [] Other:     Office:   [x] PCP/Provider -   [] Specialty/Provider -     Medication: levothyroxine 25 mcg tablet Take 1 tablet (25 mcg total) by mouth daily       Pharmacy: [id number] - [place] - [address] [place] [address]     Does the patient have enough for 3 days?   [] Yes   [x] No - Send as HP to POD

## 2024-11-18 DIAGNOSIS — E03.9 ACQUIRED HYPOTHYROIDISM: ICD-10-CM

## 2024-11-18 RX ORDER — LEVOTHYROXINE SODIUM 200 UG/1
200 TABLET ORAL DAILY
Qty: 90 TABLET | Refills: 1 | Status: SHIPPED | OUTPATIENT
Start: 2024-11-18

## 2024-11-18 NOTE — TELEPHONE ENCOUNTER
Reason for call:   [x] Refill   [] Prior Auth  [] Other:     Office:   [x] PCP/Provider - MISTY Cornelius   [] Specialty/Provider -     Medication: levothyroxine 200 mcg tablet     Dose/Frequency: Take 1 tablet (200 mcg total) by mouth daily     Quantity: 90    Pharmacy: [facility] #[id number] - [place] - [id number] [phone]    Does the patient have enough for 3 days?   [] Yes   [x] No - Send as HP to POD

## 2024-11-21 NOTE — ANESTHESIA PREPROCEDURE EVALUATION
Procedure:  COLONOSCOPY    Relevant Problems   CARDIO   (+) Hyperlipidemia   (+) Hypertension      ENDO   (+) Hypothyroidism      MUSCULOSKELETAL   (+) Chronic back pain      NEURO/PSYCH   (+) Chronic back pain   (+) Chronic pain disorder        Physical Exam    Airway    Mallampati score: I  TM Distance: >3 FB  Neck ROM: full     Dental       Cardiovascular  Cardiovascular exam normal    Pulmonary  Pulmonary exam normal     Other Findings        Anesthesia Plan  ASA Score- 3     Anesthesia Type- IV sedation with anesthesia with ASA Monitors.         Additional Monitors:     Airway Plan:            Plan Factors-Exercise tolerance (METS): >4 METS.    Chart reviewed. EKG reviewed.  Existing labs reviewed. Patient summary reviewed.    Patient is not a current smoker.  Patient did not smoke on day of surgery.    Obstructive sleep apnea risk education given perioperatively.        Induction- intravenous.    Postoperative Plan-     Perioperative Resuscitation Plan - Level 1 - Full Code.       Informed Consent- Anesthetic plan and risks discussed with patient.  I personally reviewed this patient with the CRNA. Discussed and agreed on the Anesthesia Plan with the CRNA..         show

## 2024-11-22 DIAGNOSIS — M10.9 GOUT, UNSPECIFIED CAUSE, UNSPECIFIED CHRONICITY, UNSPECIFIED SITE: ICD-10-CM

## 2024-11-22 RX ORDER — ALLOPURINOL 100 MG/1
100 TABLET ORAL DAILY
Qty: 90 TABLET | Refills: 0 | Status: SHIPPED | OUTPATIENT
Start: 2024-11-22

## 2025-02-13 DIAGNOSIS — E55.9 VITAMIN D DEFICIENCY: ICD-10-CM

## 2025-02-13 DIAGNOSIS — I10 PRIMARY HYPERTENSION: ICD-10-CM

## 2025-02-13 DIAGNOSIS — M10.9 GOUT, UNSPECIFIED CAUSE, UNSPECIFIED CHRONICITY, UNSPECIFIED SITE: ICD-10-CM

## 2025-02-13 DIAGNOSIS — E03.9 ACQUIRED HYPOTHYROIDISM: ICD-10-CM

## 2025-02-13 DIAGNOSIS — Z13.29 SCREENING FOR THYROID DISORDER: ICD-10-CM

## 2025-02-13 DIAGNOSIS — M1A.09X0 IDIOPATHIC CHRONIC GOUT OF MULTIPLE SITES WITHOUT TOPHUS: ICD-10-CM

## 2025-02-13 DIAGNOSIS — Z13.1 SCREENING FOR DIABETES MELLITUS: ICD-10-CM

## 2025-02-13 DIAGNOSIS — Z11.59 NEED FOR HEPATITIS C SCREENING TEST: ICD-10-CM

## 2025-02-13 DIAGNOSIS — E03.8 OTHER SPECIFIED HYPOTHYROIDISM: ICD-10-CM

## 2025-02-13 DIAGNOSIS — Z00.00 ANNUAL PHYSICAL EXAM: Primary | ICD-10-CM

## 2025-02-13 DIAGNOSIS — E78.2 MIXED HYPERLIPIDEMIA: ICD-10-CM

## 2025-02-13 DIAGNOSIS — Z12.5 SCREENING FOR PROSTATE CANCER: ICD-10-CM

## 2025-02-13 DIAGNOSIS — Z11.4 SCREENING FOR HIV (HUMAN IMMUNODEFICIENCY VIRUS): ICD-10-CM

## 2025-02-13 RX ORDER — LEVOTHYROXINE SODIUM 200 UG/1
200 TABLET ORAL DAILY
Qty: 30 TABLET | Refills: 0 | Status: SHIPPED | OUTPATIENT
Start: 2025-02-13 | End: 2025-02-14

## 2025-02-13 RX ORDER — LEVOTHYROXINE SODIUM 25 UG/1
25 TABLET ORAL DAILY
Qty: 30 TABLET | Refills: 0 | Status: SHIPPED | OUTPATIENT
Start: 2025-02-13 | End: 2025-02-14

## 2025-02-13 RX ORDER — ALLOPURINOL 100 MG/1
100 TABLET ORAL DAILY
Qty: 30 TABLET | Refills: 0 | Status: SHIPPED | OUTPATIENT
Start: 2025-02-13

## 2025-02-13 RX ORDER — AMLODIPINE BESYLATE 5 MG/1
5 TABLET ORAL DAILY
Qty: 30 TABLET | Refills: 0 | Status: SHIPPED | OUTPATIENT
Start: 2025-02-13 | End: 2025-02-14

## 2025-02-13 NOTE — TELEPHONE ENCOUNTER
Reason for call:   [x] Refill   [] Prior Auth  [] Other:     Office:   [x] PCP/Provider -   [] Specialty/Provider -     Medication:   levothyroxine 25 mcg tablet 25 mcg, Oral, Daily      levothyroxine 200 mcg tablet 200 mcg, Oral, Daily      amLODIPine (NORVASC) 5 mg tablet 5 mg, Oral, Daily    allopurinol (ZYLOPRIM) 100 mg tablet 100 mg, Oral, Daily    Quantity: 90 day (must be for insurance)    Pharmacy: [facility]/pharmacy #[id number] - [place] - [address] [phone]    Does the patient have enough for 3 days?   [] Yes   [x] No - Send as HP to POD

## 2025-02-13 NOTE — TELEPHONE ENCOUNTER
Left message to schedule him this month with Brooke explained that his meds were only filled for 30 days and needs to be seen.

## 2025-02-14 DIAGNOSIS — I10 PRIMARY HYPERTENSION: ICD-10-CM

## 2025-02-14 DIAGNOSIS — E03.9 ACQUIRED HYPOTHYROIDISM: ICD-10-CM

## 2025-02-14 RX ORDER — LEVOTHYROXINE SODIUM 25 UG/1
TABLET ORAL
Refills: 0 | OUTPATIENT
Start: 2025-02-14

## 2025-02-14 RX ORDER — LEVOTHYROXINE SODIUM 25 UG/1
25 TABLET ORAL DAILY
Qty: 90 TABLET | Refills: 3 | Status: SHIPPED | OUTPATIENT
Start: 2025-02-14

## 2025-02-14 RX ORDER — LEVOTHYROXINE SODIUM 200 UG/1
TABLET ORAL
Refills: 0 | OUTPATIENT
Start: 2025-02-14

## 2025-02-14 RX ORDER — AMLODIPINE BESYLATE 5 MG/1
TABLET ORAL
Refills: 0 | OUTPATIENT
Start: 2025-02-14

## 2025-02-14 RX ORDER — LEVOTHYROXINE SODIUM 200 UG/1
200 TABLET ORAL DAILY
Qty: 90 TABLET | Refills: 3 | Status: SHIPPED | OUTPATIENT
Start: 2025-02-14

## 2025-02-14 RX ORDER — AMLODIPINE BESYLATE 5 MG/1
5 TABLET ORAL DAILY
Qty: 90 TABLET | Refills: 3 | Status: SHIPPED | OUTPATIENT
Start: 2025-02-14

## 2025-03-12 DIAGNOSIS — M10.9 GOUT, UNSPECIFIED CAUSE, UNSPECIFIED CHRONICITY, UNSPECIFIED SITE: ICD-10-CM

## 2025-03-26 DIAGNOSIS — M10.9 GOUT, UNSPECIFIED CAUSE, UNSPECIFIED CHRONICITY, UNSPECIFIED SITE: ICD-10-CM

## 2025-03-26 RX ORDER — ALLOPURINOL 100 MG/1
100 TABLET ORAL DAILY
Qty: 30 TABLET | Refills: 0 | Status: SHIPPED | OUTPATIENT
Start: 2025-03-26

## 2025-03-26 NOTE — TELEPHONE ENCOUNTER
Pt spouse called regarding a refill for allopurinol advise spouse Pt was given a courtesy refill and he is overdue for an appt. Danisha was transferred to the office to schedule.

## 2025-04-07 ENCOUNTER — OFFICE VISIT (OUTPATIENT)
Dept: FAMILY MEDICINE CLINIC | Facility: CLINIC | Age: 50
End: 2025-04-07
Payer: COMMERCIAL

## 2025-04-07 VITALS
RESPIRATION RATE: 18 BRPM | TEMPERATURE: 96.9 F | WEIGHT: 315 LBS | OXYGEN SATURATION: 96 % | SYSTOLIC BLOOD PRESSURE: 148 MMHG | HEIGHT: 75 IN | DIASTOLIC BLOOD PRESSURE: 88 MMHG | BODY MASS INDEX: 39.17 KG/M2 | HEART RATE: 90 BPM

## 2025-04-07 DIAGNOSIS — M25.50 ARTHRALGIA, UNSPECIFIED JOINT: ICD-10-CM

## 2025-04-07 DIAGNOSIS — Z00.00 ANNUAL PHYSICAL EXAM: ICD-10-CM

## 2025-04-07 DIAGNOSIS — N40.0 BPH WITHOUT OBSTRUCTION/LOWER URINARY TRACT SYMPTOMS: ICD-10-CM

## 2025-04-07 DIAGNOSIS — I10 PRIMARY HYPERTENSION: Primary | ICD-10-CM

## 2025-04-07 DIAGNOSIS — R68.82 DECREASED LIBIDO: ICD-10-CM

## 2025-04-07 DIAGNOSIS — Z12.12 SCREENING FOR COLORECTAL CANCER: ICD-10-CM

## 2025-04-07 DIAGNOSIS — Z12.11 SCREENING FOR COLORECTAL CANCER: ICD-10-CM

## 2025-04-07 DIAGNOSIS — F11.20 OPIOID DEPENDENCE, UNCOMPLICATED (HCC): ICD-10-CM

## 2025-04-07 DIAGNOSIS — M10.9 GOUT, UNSPECIFIED CAUSE, UNSPECIFIED CHRONICITY, UNSPECIFIED SITE: ICD-10-CM

## 2025-04-07 PROCEDURE — 99396 PREV VISIT EST AGE 40-64: CPT

## 2025-04-07 RX ORDER — CIPROFLOXACIN 500 MG/1
500 TABLET, FILM COATED ORAL 2 TIMES DAILY
COMMUNITY
Start: 2025-04-03 | End: 2025-04-07

## 2025-04-07 RX ORDER — PHENAZOPYRIDINE HYDROCHLORIDE 200 MG/1
TABLET, FILM COATED ORAL
COMMUNITY
Start: 2025-04-03

## 2025-04-07 RX ORDER — ALLOPURINOL 100 MG/1
100 TABLET ORAL DAILY
Qty: 90 TABLET | Refills: 3 | Status: SHIPPED | OUTPATIENT
Start: 2025-04-07

## 2025-04-07 NOTE — PROGRESS NOTES
Adult Annual Physical  Name: Rodrigo Lemons      : 1975      MRN: [id number]  Encounter Provider: Brooke Rich PA-C  Encounter Date: 2025   Encounter department: [facility] CARE    :  Assessment & Plan  Annual physical exam  Immunizations and preventive care screenings were discussed with patient today.   Appropriate education was printed on patient's after visit summary.  Patient presents to establish care and for routine physical.   Physical exam unremarkable.          Primary hypertension  -/88 today  -Patient currently on amlodipine 5 mg   -Will have him check his BP at home and send a picture of his readings and will make decision about medication changes at that time        Decreased libido  -Will check testeosterone levels  -Was checked once according to him and was low  Orders:    Testosterone, free, total; Future    Arthralgia, unspecified joint  -Patient would like rheumatology labs done again as they were done once before and came up positive   -Patient did go to rhuematologist and was told he did not have any of the conditions and not to worry  Orders:    VINH Screen w/Reflex Cascade; Future    RHEUMATOID FACTOR; Future    Sedimentation rate, automated; Future    Opioid dependence, uncomplicated (HCC)  -On chronic Oxycodone and Xtampza  -Prescibed by his pain management doctor who he has been with for 20 years in [place]  -Patient with chronic back pain       Gout, unspecified cause, unspecified chronicity, unspecified site  -Has not had a flare-up of his gout in a while   Orders:    allopurinol (ZYLOPRIM) 100 mg tablet; Take 1 tablet (100 mg total) by mouth daily    BPH without obstruction/lower urinary tract symptoms  -Patient going to the bathroom 3-4 times per night   -Would like a referral to urology to get his prostate checked   Orders:    Ambulatory Referral to Urology; Future    Screening for colorectal cancer  -Had colonoscopy last year but had bad prep  -Will  need colonoscopy again  Orders:    Ambulatory Referral to Gastroenterology; Future        Preventive Screenings:  - Diabetes Screening: orders placed  - Cholesterol Screening: screening not indicated and has hyperlipidemia   - Hepatitis C screening: orders placed   - HIV screening: orders placed   - Colon cancer screening: screening up-to-date   - Lung cancer screening: screening not indicated   - Prostate cancer screening: orders placed     Immunizations:  - Immunizations due: Influenza    Counseling/Anticipatory Guidance:  - Alcohol: discussed moderation in alcohol intake and recommendations for healthy alcohol use.   - Drug use: discussed harms of illicit drug use and how it can negatively impact mental/physical health.   - Tobacco use: discussed harms of tobacco use and management options for quitting.   - Dental health: discussed importance of regular tooth brushing, flossing, and dental visits.   - Sexual health: discussed sexually transmitted diseases, partner selection, use of condoms, avoidance of unintended pregnancy, and contraceptive alternatives.   - Diet: discussed recommendations for a healthy/well-balanced diet.   - Exercise: the importance of regular exercise/physical activity was discussed. Recommend exercise 3-5 times per week for at least 30 minutes.   - Injury prevention: discussed safety/seat belts, safety helmets, smoke detectors, carbon monoxide detectors, and smoking near bedding or upholstery.       Depression Screening and Follow-up Plan: Patient was screened for depression during today's encounter. They screened negative with a PHQ-2 score of 0.          History of Present Illness     Adult Annual Physical:  Patient presents for annual physical. Patient is a 49-year-old male who presents today for AWV. He states that he has been feeling well. He states that he continues to get recurrent UTI's and is unsure why. He is currently being treated right now for a UTI. He also states that he is  very tired and has no libido.His testosterone was checked at one point and he states it was low and would like it checked again. He has chronic back pain with who he is seeing pain management for. He denies any other acute concerns at this time. He denies chest pain, SOB, abdominal pain, changes in bowels. .     Diet and Physical Activity:  - Diet/Nutrition: consuming 3-5 servings of fruits/vegetables daily, high fat diet and well balanced diet.  - Exercise: walking and 3-4 times a week on average.    Depression Screening:  - PHQ-2 Score: 0  - PHQ-9 Score: 0    General Health:  - Sleep: sleeps well and snores loudly.  - Hearing: normal hearing bilateral ears.  - Vision: wears glasses, wears contacts, most recent eye exam < 1 year ago and vision problems.  - Dental: regular dental visits and brushes teeth once daily.     Health:  - History of STDs: no.   - Urinary symptoms: urinary urgency and urinary frequency.     Review of Systems   Constitutional:  Negative for chills, fatigue and fever.   HENT:  Negative for congestion, ear pain and sore throat.    Eyes:  Negative for pain.   Respiratory:  Negative for cough, chest tightness and shortness of breath.    Cardiovascular:  Negative for chest pain and palpitations.   Gastrointestinal:  Negative for abdominal pain, constipation, diarrhea, nausea and vomiting.   Genitourinary:  Positive for frequency. Negative for difficulty urinating and dysuria.   Musculoskeletal:  Positive for back pain. Negative for arthralgias.   Skin:  Negative for color change and rash.   Neurological:  Negative for syncope and headaches.   All other systems reviewed and are negative.    Current Outpatient Medications on File Prior to Visit   Medication Sig Dispense Refill    amLODIPine (NORVASC) 5 mg tablet Take 1 tablet (5 mg total) by mouth daily 90 tablet 1    cholecalciferol (VITAMIN D3) 400 units tablet Take 1,000 Units by mouth daily      ibuprofen (MOTRIN) 800 mg tablet Take 800 mg by  "mouth every 6 (six) hours as needed for mild pain      levothyroxine 200 mcg tablet Take 1 tablet (200 mcg total) by mouth daily 90 tablet 3    Multiple Vitamins-Minerals (multivitamin with minerals) tablet Take 1 tablet by mouth daily      oxyCODONE (ROXICODONE) 15 mg immediate release tablet       phenazopyridine (PYRIDIUM) 200 mg tablet TAKE 1 TABLET BY MOUTH EVERY 8 HOURS AS NEEDED FOR URINARY DISCOMFORT.      XTAMPZA ER 36 MG C12A       [DISCONTINUED] allopurinol (ZYLOPRIM) 100 mg tablet Take 1 tablet (100 mg total) by mouth daily 30 tablet 0    [DISCONTINUED] ciprofloxacin (CIPRO) 500 mg tablet Take 500 mg by mouth 2 (two) times a day      [DISCONTINUED] levothyroxine 25 mcg tablet Take 1 tablet (25 mcg total) by mouth daily 90 tablet 3    [DISCONTINUED] amLODIPine (NORVASC) 5 mg tablet Take 1 tablet (5 mg total) by mouth daily (Patient not taking: Reported on 4/7/2025) 90 tablet 3    [DISCONTINUED] sildenafil (REVATIO) 20 mg tablet Take 1 tablet (20 mg total) by mouth daily as needed (Erectile dysfunction) (Patient not taking: Reported on 2/16/2024) 30 tablet 6     No current facility-administered medications on file prior to visit.        Objective   /88   Pulse 90   Temp (!) 96.9 °F (36.1 °C) (Tympanic)   Resp 18   Ht 6' 3\" (1.905 m)   Wt (!) 152 kg (335 lb)   SpO2 96%   BMI 41.87 kg/m²     Physical Exam  Vitals and nursing note reviewed.   Constitutional:       General: He is not in acute distress.     Appearance: Normal appearance. He is well-developed.   HENT:      Head: Normocephalic and atraumatic.      Right Ear: Tympanic membrane normal.      Left Ear: Tympanic membrane normal.      Nose: Nose normal.      Mouth/Throat:      Mouth: Mucous membranes are moist.   Eyes:      Conjunctiva/sclera: Conjunctivae normal.   Cardiovascular:      Rate and Rhythm: Normal rate and regular rhythm.      Heart sounds: Normal heart sounds. No murmur heard.  Pulmonary:      Effort: Pulmonary effort is " normal. No respiratory distress.      Breath sounds: Normal breath sounds. No wheezing or rhonchi.   Abdominal:      Palpations: Abdomen is soft.      Tenderness: There is no abdominal tenderness.   Musculoskeletal:         General: No swelling.      Cervical back: Neck supple.   Skin:     General: Skin is warm and dry.      Capillary Refill: Capillary refill takes less than 2 seconds.   Neurological:      Mental Status: He is alert and oriented to person, place, and time.   Psychiatric:         Mood and Affect: Mood normal.         Behavior: Behavior normal.         Thought Content: Thought content normal.         Judgment: Judgment normal.       Administrative Statements   I have spent a total time of 35 minutes in caring for this patient on the day of the visit/encounter including Diagnostic results, Prognosis, Risks and benefits of tx options, Instructions for management, Patient and family education, Importance of tx compliance, Risk factor reductions, Impressions, Counseling / Coordination of care, Documenting in the medical record, Reviewing/placing orders in the medical record (including tests, medications, and/or procedures), and Obtaining or reviewing history  .

## 2025-04-07 NOTE — ASSESSMENT & PLAN NOTE
-Will check testeosterone levels  -Was checked once according to him and was low  Orders:    Testosterone, free, total; Future

## 2025-04-08 NOTE — ASSESSMENT & PLAN NOTE
-/88 today  -Patient currently on amlodipine 5 mg   -Will have him check his BP at home and send a picture of his readings and will make decision about medication changes at that time

## 2025-04-18 ENCOUNTER — LAB (OUTPATIENT)
Dept: LAB | Facility: CLINIC | Age: 50
End: 2025-04-18
Payer: COMMERCIAL

## 2025-04-18 DIAGNOSIS — Z13.1 SCREENING FOR DIABETES MELLITUS: ICD-10-CM

## 2025-04-18 DIAGNOSIS — E03.8 OTHER SPECIFIED HYPOTHYROIDISM: ICD-10-CM

## 2025-04-18 DIAGNOSIS — E78.2 MIXED HYPERLIPIDEMIA: ICD-10-CM

## 2025-04-18 DIAGNOSIS — Z13.29 SCREENING FOR THYROID DISORDER: ICD-10-CM

## 2025-04-18 DIAGNOSIS — Z11.4 SCREENING FOR HIV (HUMAN IMMUNODEFICIENCY VIRUS): ICD-10-CM

## 2025-04-18 DIAGNOSIS — Z11.59 NEED FOR HEPATITIS C SCREENING TEST: ICD-10-CM

## 2025-04-18 DIAGNOSIS — Z12.5 SCREENING FOR PROSTATE CANCER: ICD-10-CM

## 2025-04-18 DIAGNOSIS — Z00.00 ANNUAL PHYSICAL EXAM: ICD-10-CM

## 2025-04-18 DIAGNOSIS — M1A.09X0 IDIOPATHIC CHRONIC GOUT OF MULTIPLE SITES WITHOUT TOPHUS: ICD-10-CM

## 2025-04-18 DIAGNOSIS — M25.50 ARTHRALGIA, UNSPECIFIED JOINT: ICD-10-CM

## 2025-04-18 DIAGNOSIS — E55.9 VITAMIN D DEFICIENCY: ICD-10-CM

## 2025-04-18 LAB
25(OH)D3 SERPL-MCNC: 34.1 NG/ML (ref 30–100)
ALBUMIN SERPL BCG-MCNC: 4.6 G/DL (ref 3.5–5)
ALP SERPL-CCNC: 78 U/L (ref 34–104)
ALT SERPL W P-5'-P-CCNC: 96 U/L (ref 7–52)
ANION GAP SERPL CALCULATED.3IONS-SCNC: 12 MMOL/L (ref 4–13)
AST SERPL W P-5'-P-CCNC: 80 U/L (ref 13–39)
BASOPHILS # BLD AUTO: 0.03 THOUSANDS/ÂΜL (ref 0–0.1)
BASOPHILS NFR BLD AUTO: 0 % (ref 0–1)
BILIRUB SERPL-MCNC: 0.72 MG/DL (ref 0.2–1)
BUN SERPL-MCNC: 22 MG/DL (ref 5–25)
CALCIUM SERPL-MCNC: 9.7 MG/DL (ref 8.4–10.2)
CHLORIDE SERPL-SCNC: 103 MMOL/L (ref 96–108)
CHOLEST SERPL-MCNC: 195 MG/DL (ref ?–200)
CO2 SERPL-SCNC: 26 MMOL/L (ref 21–32)
CREAT SERPL-MCNC: 0.99 MG/DL (ref 0.6–1.3)
EOSINOPHIL # BLD AUTO: 0.08 THOUSAND/ÂΜL (ref 0–0.61)
EOSINOPHIL NFR BLD AUTO: 1 % (ref 0–6)
ERYTHROCYTE [DISTWIDTH] IN BLOOD BY AUTOMATED COUNT: 14.1 % (ref 11.6–15.1)
ERYTHROCYTE [SEDIMENTATION RATE] IN BLOOD: 28 MM/HOUR (ref 0–14)
EST. AVERAGE GLUCOSE BLD GHB EST-MCNC: 137 MG/DL
GFR SERPL CREATININE-BSD FRML MDRD: 89 ML/MIN/1.73SQ M
GLUCOSE P FAST SERPL-MCNC: 115 MG/DL (ref 65–99)
HBA1C MFR BLD: 6.4 %
HCT VFR BLD AUTO: 43.2 % (ref 36.5–49.3)
HDLC SERPL-MCNC: 46 MG/DL
HGB BLD-MCNC: 14.3 G/DL (ref 12–17)
IMM GRANULOCYTES # BLD AUTO: 0.03 THOUSAND/UL (ref 0–0.2)
IMM GRANULOCYTES NFR BLD AUTO: 0 % (ref 0–2)
LDLC SERPL CALC-MCNC: 126 MG/DL (ref 0–100)
LYMPHOCYTES # BLD AUTO: 2.06 THOUSANDS/ÂΜL (ref 0.6–4.47)
LYMPHOCYTES NFR BLD AUTO: 28 % (ref 14–44)
MCH RBC QN AUTO: 28.6 PG (ref 26.8–34.3)
MCHC RBC AUTO-ENTMCNC: 33.1 G/DL (ref 31.4–37.4)
MCV RBC AUTO: 86 FL (ref 82–98)
MONOCYTES # BLD AUTO: 0.49 THOUSAND/ÂΜL (ref 0.17–1.22)
MONOCYTES NFR BLD AUTO: 7 % (ref 4–12)
NEUTROPHILS # BLD AUTO: 4.65 THOUSANDS/ÂΜL (ref 1.85–7.62)
NEUTS SEG NFR BLD AUTO: 64 % (ref 43–75)
NRBC BLD AUTO-RTO: 0 /100 WBCS
PLATELET # BLD AUTO: 277 THOUSANDS/UL (ref 149–390)
PMV BLD AUTO: 10.6 FL (ref 8.9–12.7)
POTASSIUM SERPL-SCNC: 4 MMOL/L (ref 3.5–5.3)
PROT SERPL-MCNC: 7.5 G/DL (ref 6.4–8.4)
PSA SERPL-MCNC: 0.53 NG/ML (ref 0–4)
RBC # BLD AUTO: 5 MILLION/UL (ref 3.88–5.62)
RHEUMATOID FACT SERPL-ACNC: <10 IU/ML
SODIUM SERPL-SCNC: 141 MMOL/L (ref 135–147)
T4 FREE SERPL-MCNC: 0.97 NG/DL (ref 0.61–1.12)
TRIGL SERPL-MCNC: 113 MG/DL (ref ?–150)
TSH SERPL DL<=0.05 MIU/L-ACNC: 5.46 UIU/ML (ref 0.45–4.5)
URATE SERPL-MCNC: 8.1 MG/DL (ref 3.5–8.5)
WBC # BLD AUTO: 7.34 THOUSAND/UL (ref 4.31–10.16)

## 2025-04-18 PROCEDURE — G0103 PSA SCREENING: HCPCS

## 2025-04-18 PROCEDURE — 87389 HIV-1 AG W/HIV-1&-2 AB AG IA: CPT

## 2025-04-18 PROCEDURE — 84439 ASSAY OF FREE THYROXINE: CPT

## 2025-04-18 PROCEDURE — 86225 DNA ANTIBODY NATIVE: CPT

## 2025-04-18 PROCEDURE — 36415 COLL VENOUS BLD VENIPUNCTURE: CPT

## 2025-04-18 PROCEDURE — 80061 LIPID PANEL: CPT

## 2025-04-18 PROCEDURE — 86431 RHEUMATOID FACTOR QUANT: CPT

## 2025-04-18 PROCEDURE — 85025 COMPLETE CBC W/AUTO DIFF WBC: CPT

## 2025-04-18 PROCEDURE — 80053 COMPREHEN METABOLIC PANEL: CPT

## 2025-04-18 PROCEDURE — 85652 RBC SED RATE AUTOMATED: CPT

## 2025-04-18 PROCEDURE — 86235 NUCLEAR ANTIGEN ANTIBODY: CPT

## 2025-04-18 PROCEDURE — 84443 ASSAY THYROID STIM HORMONE: CPT

## 2025-04-18 PROCEDURE — 86038 ANTINUCLEAR ANTIBODIES: CPT

## 2025-04-18 PROCEDURE — 82306 VITAMIN D 25 HYDROXY: CPT

## 2025-04-18 PROCEDURE — 86803 HEPATITIS C AB TEST: CPT

## 2025-04-18 PROCEDURE — 83036 HEMOGLOBIN GLYCOSYLATED A1C: CPT

## 2025-04-18 PROCEDURE — 84550 ASSAY OF BLOOD/URIC ACID: CPT

## 2025-04-19 LAB
HCV AB SER QL: NORMAL
HIV 1+2 AB+HIV1 P24 AG SERPL QL IA: NORMAL

## 2025-04-21 ENCOUNTER — RESULTS FOLLOW-UP (OUTPATIENT)
Dept: FAMILY MEDICINE CLINIC | Facility: CLINIC | Age: 50
End: 2025-04-21

## 2025-04-21 LAB
DSDNA IGG SERPL IA-ACNC: <0.9 IU/ML (ref ?–15)
NUCLEAR IGG SER IA-RTO: 3.5 RATIO (ref ?–1)

## 2025-04-22 ENCOUNTER — TELEPHONE (OUTPATIENT)
Age: 50
End: 2025-04-22

## 2025-04-22 LAB
ANA PAT SER IF-IMP: ABNORMAL
ANA PAT SER IF-IMP: ABNORMAL
ANA SER QL IF: POSITIVE
ANA TITR SER HEP2 SUBST: ABNORMAL {TITER}
CENTROMERE B AB SER-ACNC: 45 U/ML (ref ?–10)
ENA JO1 AB SER IA-ACNC: <0.5 U/ML (ref ?–10)
ENA SCL70 IGG SER IA-ACNC: 0.9 U/ML (ref ?–10)
ENA SM IGG SER IA-ACNC: <0.8 U/ML (ref ?–10)
ENA SS-A AB SER IA-ACNC: <0.5 U/ML (ref ?–10)
ENA SS-B IGG SER IA-ACNC: <0.6 U/ML (ref ?–10)
U1 SNRNP IGG SER IA-ACNC: 2 U/ML (ref ?–10)

## 2025-04-22 NOTE — TELEPHONE ENCOUNTER
Patient's wife called, she is requesting a call back once marlen reviews Expanded KEE Profile and VINH by [facility]    Please advise.

## 2025-04-24 ENCOUNTER — TELEPHONE (OUTPATIENT)
Dept: GASTROENTEROLOGY | Facility: CLINIC | Age: 50
End: 2025-04-24

## 2025-04-24 NOTE — TELEPHONE ENCOUNTER
Called and left a message for the patient to call back to schedule 1 year repeat colonoscopy from recall. The patient will need 2 day Golytely prep. Sent a letter via myc

## 2025-04-25 DIAGNOSIS — R76.8 POSITIVE ANA (ANTINUCLEAR ANTIBODY): ICD-10-CM

## 2025-04-25 DIAGNOSIS — M25.50 ARTHRALGIA, UNSPECIFIED JOINT: Primary | ICD-10-CM

## 2025-04-25 DIAGNOSIS — R76.8 ANTICENTROMERE ANTIBODIES PRESENT: ICD-10-CM

## 2025-04-25 NOTE — TELEPHONE ENCOUNTER
Let wife know that we should send him to a rheumatologist to properly diagnose him. It does look like he has some type of autoimmune condition based off of his labs, but he may need more specific labs that the rheumatologist will have to order.

## 2025-05-11 RX ORDER — ALLOPURINOL 100 MG/1
100 TABLET ORAL DAILY
Qty: 90 TABLET | Refills: 1 | OUTPATIENT
Start: 2025-05-11

## 2025-06-20 ENCOUNTER — PREP FOR PROCEDURE (OUTPATIENT)
Dept: GASTROENTEROLOGY | Facility: CLINIC | Age: 50
End: 2025-06-20

## 2025-06-20 DIAGNOSIS — Z12.11 SCREEN FOR COLON CANCER: Primary | ICD-10-CM

## 2025-06-20 DIAGNOSIS — Z12.11 SCREENING FOR COLON CANCER: Primary | ICD-10-CM

## 2025-06-20 RX ORDER — BISACODYL 5 MG/1
TABLET, DELAYED RELEASE ORAL
Qty: 4 TABLET | Refills: 0 | Status: SHIPPED | OUTPATIENT
Start: 2025-06-20

## 2025-06-20 RX ORDER — POLYETHYLENE GLYCOL 3350 17 G/17G
POWDER, FOR SOLUTION ORAL
Qty: 238 G | Refills: 0 | Status: SHIPPED | OUTPATIENT
Start: 2025-06-20

## 2025-06-26 ENCOUNTER — TELEPHONE (OUTPATIENT)
Dept: GASTROENTEROLOGY | Facility: CLINIC | Age: 50
End: 2025-06-26

## 2025-06-26 NOTE — TELEPHONE ENCOUNTER
"----- Message from Lucy NORTH sent at 6/26/2025 11:28 AM EDT -----  Regarding: RE: colon dx  Unfortunately he doesn't have any symptoms to substantiate a dx, a repeat of a screening dx colonoscopy will not be covered. Please let the patient and see if he wants to cancel or pay out of pocket  ----- Message -----  From: Jordon Michel  Sent: 6/25/2025   8:49 AM EDT  To: Lucy Whitmore MA  Subject: FW: colon dx                                     HI, I am unsure how we resolve this dx code issue. Any suggestions on what we might be able to change it to?  ----- Message -----  From: Sommer Graves  Sent: 6/25/2025   8:32 AM EDT  To: Jordon Michel  Subject: RE: colon dx                                     Hello.  It states the cecum reached  in the report with time.  ----- Message -----  From: Jordon Michel  Sent: 6/25/2025   8:14 AM EDT  To: Sommer Graves  Subject: FW: colon dx                                       ----- Message -----  From: Faraz Wheeler MD  Sent: 6/24/2025   8:05 PM EDT  To: Jordon Michel  Subject: RE: colon dx                                     Hi.  I reviewed the procedure note.  I do not see the words\" complete to cecum\" in the note for the procedure.  It does note the [place] prep scale as correlating with inadequate prep.  Inadequate prep is documented in multiple places.  I have also modified the note now to make it more completely clear that it was an incomplete exam for colon cancer screening purposes.  Hope that helps.  ----- Message -----  From: Jordon Michel  Sent: 6/24/2025   2:40 PM EDT  To: Faraz Wheeler MD  Subject: FW: colon dx                                     Hi Dr. Wheeler,   Please see message from Sommer. Thanks!  ----- Message -----  From: Sommer Graves  Sent: 6/24/2025   2:36 PM EDT  To: Jordon Michel  Subject: RE: colon dx                                     Hi Holley!  The report says complete to Cecum.  Once they reach the Cecum the " colonoscopy is considered complete and billed as so. If the patient is having any symptoms then that dx can be used.  Hopefully this is helpful  ----- Message -----  From: Jordon Michel  Sent: 6/24/2025   2:19 PM EDT  To: Sommer Graves  Subject: FW: colon dx                                     Hi Sommer,   Please see message from Dr. Wheeler regarding the patient's last procedure note. Any recommendations will be helpful of what we can change the DX to. Thanks!!  ----- Message -----  From: Faraz Wheeler MD  Sent: 6/24/2025   1:29 PM EDT  To: Jordon Michel  Subject: RE: colon dx                                     Hi.  Definitely interesting situation as he had the colonoscopy initially for colon cancer screening and it was an incomplete exam due to inadequate prep.  My note clearly stated that although no large masses were seen, small lesions could have been missed such as precancerous polyps.  I am not sure what other diagnoses he would qualify for since he had an incomplete exam and the indication would be colon cancer screening at this point.  Could you check with insurance about the situation and get back to me with some options.  Thank you for looking into this.  ----- Message -----  From: Jordon Michel  Sent: 6/24/2025   8:14 AM EDT  To: Faraz Wheeler MD  Subject: FW: colon dx                                     Hi Dr. Wheeler,   What should we change the dx to for this gentleman's procedure? Per our insurance auth team, the patient is not eligible for screening.  ----- Message -----  From: Sommer Graves  Sent: 6/23/2025  10:11 AM EDT  To: Jordon Michel  Subject: colon dx                                         Please review patient's chart. Patient is scheduled for a screening colon dx z12.11 and just had a colon last year. It was poor prep but was complete to Cecum so patient is not eligible for another screening colonoscopy. Please change the dx code.Thank you!

## 2025-06-26 NOTE — TELEPHONE ENCOUNTER
"I called & lvm with the patient per message from my manager regarding dx code for colonoscopy on 7/15. I explained in the VM due to him having a \"screening\" colonoscopy back in 2024, the insurance will not allow them to have another screening that will be covered.   "

## 2025-06-30 NOTE — TELEPHONE ENCOUNTER
I spoke with the patient again this morning regarding dx code for outpatient colonoscopy procedure. The patient advised he does have a family history of colon cancer (both grandparents) on his father's side. I explained I will let the provider know and that way we can enter a different dx code for procedure coverage.

## 2025-07-03 ENCOUNTER — LAB (OUTPATIENT)
Dept: LAB | Facility: CLINIC | Age: 50
End: 2025-07-03
Payer: COMMERCIAL

## 2025-07-03 DIAGNOSIS — R68.82 DECREASED LIBIDO: ICD-10-CM

## 2025-07-03 PROCEDURE — 36415 COLL VENOUS BLD VENIPUNCTURE: CPT

## 2025-07-03 PROCEDURE — 84403 ASSAY OF TOTAL TESTOSTERONE: CPT

## 2025-07-03 PROCEDURE — 84402 ASSAY OF FREE TESTOSTERONE: CPT

## 2025-07-05 LAB
TESTOST FREE SERPL-MCNC: 0.7 PG/ML (ref 7.2–24)
TESTOST SERPL-MCNC: 167 NG/DL (ref 264–916)

## 2025-07-07 ENCOUNTER — RESULTS FOLLOW-UP (OUTPATIENT)
Dept: FAMILY MEDICINE CLINIC | Facility: CLINIC | Age: 50
End: 2025-07-07

## 2025-07-14 ENCOUNTER — TELEPHONE (OUTPATIENT)
Dept: FAMILY MEDICINE CLINIC | Facility: CLINIC | Age: 50
End: 2025-07-14

## 2025-07-14 DIAGNOSIS — E03.9 ACQUIRED HYPOTHYROIDISM: Primary | ICD-10-CM

## 2025-07-14 RX ORDER — LEVOTHYROXINE SODIUM 25 UG/1
25 TABLET ORAL
Qty: 100 TABLET | Refills: 3 | Status: SHIPPED | OUTPATIENT
Start: 2025-07-14

## 2025-07-14 NOTE — TELEPHONE ENCOUNTER
Patient called the RX Refill Line. Message is being forwarded to the office.     Patient is requesting a refill on levothyroxine 25 mcg tablet. Not active on medication list. Please send script to [facility] in [place].    Please contact patient at [phone]

## 2025-07-14 NOTE — TELEPHONE ENCOUNTER
Per the result note from 04/18 he has been taking levothyroxine 225 mcg, but the 25 mcg is not listed.     Patient called the RX Refill Line. Message is being forwarded to the office.      Patient is requesting a refill on levothyroxine 25 mcg tablet. Not active on medication list. Please send script to [facility] in [place].     Please contact patient at [phone]       Note from Brooke: Just reaching out to you regarding your labs.  Your A1c is 6.4 which is on the cusp of prediabetic and diabetic, so I would definitely make sure that you are avoiding all foods and drinks with added sugar and carbs. Your thyroid is also a little underactive.  You have been taking the 225 mcg every day?  We might have to increase it if you have been pretty regular with that dose.

## 2025-07-22 ENCOUNTER — TELEPHONE (OUTPATIENT)
Dept: OTHER | Facility: OTHER | Age: 50
End: 2025-07-22

## 2025-07-23 ENCOUNTER — ANESTHESIA EVENT (OUTPATIENT)
Dept: GASTROENTEROLOGY | Facility: HOSPITAL | Age: 50
End: 2025-07-23
Payer: COMMERCIAL

## 2025-07-23 ENCOUNTER — ANESTHESIA (OUTPATIENT)
Dept: GASTROENTEROLOGY | Facility: HOSPITAL | Age: 50
End: 2025-07-23
Payer: COMMERCIAL

## 2025-07-23 ENCOUNTER — HOSPITAL ENCOUNTER (OUTPATIENT)
Dept: GASTROENTEROLOGY | Facility: HOSPITAL | Age: 50
Setting detail: OUTPATIENT SURGERY
Discharge: HOME | End: 2025-07-23
Attending: STUDENT IN AN ORGANIZED HEALTH CARE EDUCATION/TRAINING PROGRAM
Payer: COMMERCIAL

## 2025-07-23 VITALS
WEIGHT: 293 LBS | TEMPERATURE: 97.5 F | OXYGEN SATURATION: 97 % | HEART RATE: 76 BPM | BODY MASS INDEX: 36.43 KG/M2 | SYSTOLIC BLOOD PRESSURE: 116 MMHG | DIASTOLIC BLOOD PRESSURE: 61 MMHG | RESPIRATION RATE: 19 BRPM | HEIGHT: 75 IN

## 2025-07-23 DIAGNOSIS — Z80.0 FAMILY HISTORY OF COLON CANCER: ICD-10-CM

## 2025-07-23 PROCEDURE — 45385 COLONOSCOPY W/LESION REMOVAL: CPT | Performed by: INTERNAL MEDICINE

## 2025-07-23 PROCEDURE — 88305 TISSUE EXAM BY PATHOLOGIST: CPT | Performed by: STUDENT IN AN ORGANIZED HEALTH CARE EDUCATION/TRAINING PROGRAM

## 2025-07-23 RX ORDER — SODIUM CHLORIDE, SODIUM LACTATE, POTASSIUM CHLORIDE, CALCIUM CHLORIDE 600; 310; 30; 20 MG/100ML; MG/100ML; MG/100ML; MG/100ML
INJECTION, SOLUTION INTRAVENOUS CONTINUOUS PRN
Status: DISCONTINUED | OUTPATIENT
Start: 2025-07-23 | End: 2025-07-23

## 2025-07-23 RX ORDER — PROPOFOL 10 MG/ML
INJECTION, EMULSION INTRAVENOUS CONTINUOUS PRN
Status: DISCONTINUED | OUTPATIENT
Start: 2025-07-23 | End: 2025-07-23

## 2025-07-23 RX ORDER — PROPOFOL 10 MG/ML
INJECTION, EMULSION INTRAVENOUS AS NEEDED
Status: DISCONTINUED | OUTPATIENT
Start: 2025-07-23 | End: 2025-07-23

## 2025-07-23 RX ADMIN — SODIUM CHLORIDE, SODIUM LACTATE, POTASSIUM CHLORIDE, AND CALCIUM CHLORIDE: .6; .31; .03; .02 INJECTION, SOLUTION INTRAVENOUS at 10:52

## 2025-07-23 RX ADMIN — PROPOFOL 150 MG: 10 INJECTION, EMULSION INTRAVENOUS at 10:55

## 2025-07-23 RX ADMIN — PROPOFOL 120 MCG/KG/MIN: 10 INJECTION, EMULSION INTRAVENOUS at 10:56

## 2025-07-23 RX ADMIN — PROPOFOL 50 MG: 10 INJECTION, EMULSION INTRAVENOUS at 10:56

## 2025-07-23 NOTE — TELEPHONE ENCOUNTER
Patient called to inquired what time was the colonoscopy for 07/23/25. Patient was advised arrival time is 8:30 am. Per [facility] procedure policy of arrival time of 1 hour prior to scheduled procedure.    No further action

## 2025-07-23 NOTE — ANESTHESIA PREPROCEDURE EVALUATION
Procedure:  COLONOSCOPY    Relevant Problems   ANESTHESIA (within normal limits)      CARDIO   (+) Hyperlipidemia   (+) Hypertension   (+) Venous insufficiency of both lower extremities   (-) VALENZUELA (dyspnea on exertion)      ENDO   (+) Hypothyroidism      MUSCULOSKELETAL   (+) Chronic back pain      NEURO/PSYCH   (+) Chronic back pain   (+) Chronic pain disorder      PULMONARY   (-) Asthma   (-) Sleep apnea   (-) URI (upper respiratory infection)        Physical Exam    Airway     Mallampati score: III  TM Distance: >3 FB  Neck ROM: full      Cardiovascular  Cardiovascular exam normal    Dental   No notable dental hx     Pulmonary  Pulmonary exam normal     Neurological    He appears awake, alert and oriented x3.      Other Findings        Anesthesia Plan  ASA Score- 2     Anesthesia Type- IV sedation with anesthesia with ASA Monitors.         Additional Monitors:     Airway Plan: natural airway.           Plan Factors-Exercise tolerance (METS): >4 METS.    Chart reviewed.   Existing labs reviewed. Patient summary reviewed.    Patient is not a current smoker.              Induction- intravenous.    Postoperative Plan- .   Monitoring Plan - Monitoring plan - standard ASA monitoring  Post Operative Pain Plan - non-opiod analgesics        Informed Consent- Anesthetic plan and risks discussed with patient.  I personally reviewed this patient with the CRNA. Discussed and agreed on the Anesthesia Plan with the CRNA..      NPO Status:  Vitals Value Taken Time   Date of last liquid 07/23/25 07/23/25 09:06   Time of last liquid 0001 07/23/25 09:06   Date of last solid 07/21/25 07/23/25 09:06   Time of last solid 1700 07/23/25 09:06

## 2025-07-23 NOTE — ANESTHESIA POSTPROCEDURE EVALUATION
Post-Op Assessment Note    CV Status:  Stable    Pain management: adequate       Mental Status:  Alert and awake   Hydration Status:  Euvolemic   PONV Controlled:  Controlled   Airway Patency:  Patent  Two or more mitigation strategies used for obstructive sleep apnea   Post Op Vitals Reviewed: Yes    No anethesia notable event occurred.    Staff: CRNA, Anesthesiologist           Last Filed PACU Vitals:  Vitals Value Taken Time   Temp 98    Pulse 70    /87    Resp 13    SpO2 97

## 2025-07-24 ENCOUNTER — CONSULT (OUTPATIENT)
Dept: FAMILY MEDICINE CLINIC | Facility: CLINIC | Age: 50
End: 2025-07-24
Payer: COMMERCIAL

## 2025-07-24 VITALS
BODY MASS INDEX: 37.03 KG/M2 | TEMPERATURE: 97.7 F | RESPIRATION RATE: 20 BRPM | DIASTOLIC BLOOD PRESSURE: 88 MMHG | HEIGHT: 75 IN | WEIGHT: 297.8 LBS | SYSTOLIC BLOOD PRESSURE: 138 MMHG | OXYGEN SATURATION: 97 % | HEART RATE: 73 BPM

## 2025-07-24 DIAGNOSIS — Z01.818 PREOP EXAMINATION: Primary | ICD-10-CM

## 2025-07-24 DIAGNOSIS — G89.29 CHRONIC BACK PAIN, UNSPECIFIED BACK LOCATION, UNSPECIFIED BACK PAIN LATERALITY: ICD-10-CM

## 2025-07-24 DIAGNOSIS — M54.9 CHRONIC BACK PAIN, UNSPECIFIED BACK LOCATION, UNSPECIFIED BACK PAIN LATERALITY: ICD-10-CM

## 2025-07-24 DIAGNOSIS — E03.9 ACQUIRED HYPOTHYROIDISM: ICD-10-CM

## 2025-07-24 PROCEDURE — 99213 OFFICE O/P EST LOW 20 MIN: CPT

## 2025-07-24 RX ORDER — TESTOSTERONE GEL, 1% 10 MG/G
GEL TRANSDERMAL
Status: CANCELLED | OUTPATIENT
Start: 2025-07-24

## 2025-07-28 PROCEDURE — 88305 TISSUE EXAM BY PATHOLOGIST: CPT | Performed by: STUDENT IN AN ORGANIZED HEALTH CARE EDUCATION/TRAINING PROGRAM

## 2025-07-29 ENCOUNTER — OFFICE VISIT (OUTPATIENT)
Age: 50
End: 2025-07-29
Payer: COMMERCIAL

## 2025-07-29 ENCOUNTER — RESULTS FOLLOW-UP (OUTPATIENT)
Dept: GASTROENTEROLOGY | Facility: CLINIC | Age: 50
End: 2025-07-29

## 2025-07-29 VITALS
HEIGHT: 75 IN | HEART RATE: 63 BPM | BODY MASS INDEX: 38 KG/M2 | OXYGEN SATURATION: 97 % | TEMPERATURE: 97.3 F | WEIGHT: 305.6 LBS

## 2025-07-29 DIAGNOSIS — Z80.0 FAMILY HISTORY OF COLON CANCER: ICD-10-CM

## 2025-07-29 DIAGNOSIS — Z83.79 FAMILY HISTORY OF CIRRHOSIS OF LIVER: ICD-10-CM

## 2025-07-29 DIAGNOSIS — Z86.0100 HISTORY OF COLON POLYPS: ICD-10-CM

## 2025-07-29 DIAGNOSIS — R16.2 HEPATOSPLENOMEGALY: ICD-10-CM

## 2025-07-29 DIAGNOSIS — K76.0 STEATOSIS OF LIVER: Primary | ICD-10-CM

## 2025-07-29 PROCEDURE — 99214 OFFICE O/P EST MOD 30 MIN: CPT | Performed by: NURSE PRACTITIONER

## 2025-08-08 ENCOUNTER — HOSPITAL ENCOUNTER (OUTPATIENT)
Dept: ULTRASOUND IMAGING | Facility: HOSPITAL | Age: 50
End: 2025-08-08
Attending: NURSE PRACTITIONER
Payer: COMMERCIAL

## 2025-08-11 ENCOUNTER — TELEPHONE (OUTPATIENT)
Age: 50
End: 2025-08-11

## 2025-08-13 ENCOUNTER — ANESTHESIA (OUTPATIENT)
Age: 50
End: 2025-08-13
Payer: COMMERCIAL

## 2025-08-13 ENCOUNTER — HOSPITAL ENCOUNTER (OUTPATIENT)
Age: 50
Discharge: HOME | End: 2025-08-13
Attending: PHYSICAL MEDICINE & REHABILITATION
Payer: COMMERCIAL

## 2025-08-13 ENCOUNTER — ANESTHESIA EVENT (OUTPATIENT)
Age: 50
End: 2025-08-13
Payer: COMMERCIAL

## 2025-08-13 VITALS
WEIGHT: 292.99 LBS | OXYGEN SATURATION: 94 % | HEIGHT: 75 IN | BODY MASS INDEX: 36.43 KG/M2 | TEMPERATURE: 97.8 F | HEART RATE: 78 BPM | RESPIRATION RATE: 18 BRPM | SYSTOLIC BLOOD PRESSURE: 134 MMHG | DIASTOLIC BLOOD PRESSURE: 84 MMHG

## 2025-08-13 DIAGNOSIS — M54.16 RADICULOPATHY, LUMBAR REGION: ICD-10-CM

## 2025-08-13 PROCEDURE — 72148 MRI LUMBAR SPINE W/O DYE: CPT

## 2025-08-13 RX ORDER — SODIUM CHLORIDE 9 MG/ML
25 INJECTION, SOLUTION INTRAVENOUS CONTINUOUS
Status: DISCONTINUED | OUTPATIENT
Start: 2025-08-13 | End: 2025-08-17 | Stop reason: HOSPADM

## 2025-08-13 RX ORDER — ONDANSETRON 2 MG/ML
4 INJECTION INTRAMUSCULAR; INTRAVENOUS ONCE AS NEEDED
Status: CANCELLED | OUTPATIENT
Start: 2025-08-13

## 2025-08-13 RX ADMIN — SODIUM CHLORIDE 25 ML/HR: 900 INJECTION, SOLUTION INTRAVENOUS at 13:10

## 2025-08-19 ENCOUNTER — TELEPHONE (OUTPATIENT)
Age: 50
End: 2025-08-19